# Patient Record
Sex: FEMALE | Race: WHITE | Employment: OTHER | URBAN - METROPOLITAN AREA
[De-identification: names, ages, dates, MRNs, and addresses within clinical notes are randomized per-mention and may not be internally consistent; named-entity substitution may affect disease eponyms.]

---

## 2017-01-13 ENCOUNTER — HOSPITAL ENCOUNTER (OUTPATIENT)
Dept: MAMMOGRAPHY | Age: 72
Discharge: HOME | End: 2017-01-13
Attending: FAMILY MEDICINE
Payer: MEDICARE

## 2017-01-13 DIAGNOSIS — Z12.31 VISIT FOR SCREENING MAMMOGRAM: ICD-10-CM

## 2017-01-13 PROCEDURE — 77067 SCR MAMMO BI INCL CAD: CPT

## 2017-02-27 ENCOUNTER — ANESTHESIA EVENT (OUTPATIENT)
Dept: ENDOSCOPY | Age: 72
End: 2017-02-27
Payer: MEDICARE

## 2017-02-27 ENCOUNTER — HOSPITAL ENCOUNTER (OUTPATIENT)
Age: 72
Setting detail: OUTPATIENT SURGERY
Discharge: HOME | End: 2017-02-27
Attending: INTERNAL MEDICINE | Admitting: INTERNAL MEDICINE
Payer: MEDICARE

## 2017-02-27 ENCOUNTER — ANESTHESIA (OUTPATIENT)
Dept: ENDOSCOPY | Age: 72
End: 2017-02-27
Payer: MEDICARE

## 2017-02-27 VITALS
TEMPERATURE: 98.6 F | SYSTOLIC BLOOD PRESSURE: 155 MMHG | DIASTOLIC BLOOD PRESSURE: 77 MMHG | HEIGHT: 62 IN | RESPIRATION RATE: 14 BRPM | BODY MASS INDEX: 22.67 KG/M2 | WEIGHT: 123.19 LBS | OXYGEN SATURATION: 100 % | HEART RATE: 80 BPM

## 2017-02-27 PROCEDURE — 76060000031 HC ANESTHESIA FIRST 0.5 HR: Performed by: INTERNAL MEDICINE

## 2017-02-27 PROCEDURE — 77030018712 HC DEV BLLN INFL BSC -B: Performed by: INTERNAL MEDICINE

## 2017-02-27 PROCEDURE — 74011250636 HC RX REV CODE- 250/636

## 2017-02-27 PROCEDURE — 74011250636 HC RX REV CODE- 250/636: Performed by: INTERNAL MEDICINE

## 2017-02-27 PROCEDURE — 76040000019: Performed by: INTERNAL MEDICINE

## 2017-02-27 PROCEDURE — 74011000250 HC RX REV CODE- 250

## 2017-02-27 RX ORDER — LIDOCAINE HYDROCHLORIDE 20 MG/ML
INJECTION, SOLUTION EPIDURAL; INFILTRATION; INTRACAUDAL; PERINEURAL AS NEEDED
Status: DISCONTINUED | OUTPATIENT
Start: 2017-02-27 | End: 2017-02-27 | Stop reason: HOSPADM

## 2017-02-27 RX ORDER — ATROPINE SULFATE 0.1 MG/ML
0.5 INJECTION INTRAVENOUS
Status: DISCONTINUED | OUTPATIENT
Start: 2017-02-27 | End: 2017-02-27 | Stop reason: HOSPADM

## 2017-02-27 RX ORDER — SODIUM CHLORIDE 0.9 % (FLUSH) 0.9 %
5-10 SYRINGE (ML) INJECTION AS NEEDED
Status: DISCONTINUED | OUTPATIENT
Start: 2017-02-27 | End: 2017-02-27 | Stop reason: HOSPADM

## 2017-02-27 RX ORDER — FLUMAZENIL 0.1 MG/ML
0.2 INJECTION INTRAVENOUS
Status: DISCONTINUED | OUTPATIENT
Start: 2017-02-27 | End: 2017-02-27 | Stop reason: HOSPADM

## 2017-02-27 RX ORDER — SUCRALFATE 1 G/10ML
1 SUSPENSION ORAL 4 TIMES DAILY
Qty: 1000 ML | Refills: 3 | Status: SHIPPED | OUTPATIENT
Start: 2017-02-27 | End: 2017-09-29

## 2017-02-27 RX ORDER — EPINEPHRINE 0.1 MG/ML
1 INJECTION INTRACARDIAC; INTRAVENOUS
Status: DISCONTINUED | OUTPATIENT
Start: 2017-02-27 | End: 2017-02-27 | Stop reason: HOSPADM

## 2017-02-27 RX ORDER — SODIUM CHLORIDE 0.9 % (FLUSH) 0.9 %
5-10 SYRINGE (ML) INJECTION EVERY 8 HOURS
Status: DISCONTINUED | OUTPATIENT
Start: 2017-02-27 | End: 2017-02-27 | Stop reason: HOSPADM

## 2017-02-27 RX ORDER — DEXTROMETHORPHAN/PSEUDOEPHED 2.5-7.5/.8
1.2 DROPS ORAL
Status: DISCONTINUED | OUTPATIENT
Start: 2017-02-27 | End: 2017-02-27 | Stop reason: HOSPADM

## 2017-02-27 RX ORDER — SUCRALFATE 1 G/1
1 TABLET ORAL 4 TIMES DAILY
Qty: 90 TAB | Refills: 3 | Status: ON HOLD | OUTPATIENT
Start: 2017-02-27 | End: 2017-04-12

## 2017-02-27 RX ORDER — SODIUM CHLORIDE 9 MG/ML
25 INJECTION, SOLUTION INTRAVENOUS CONTINUOUS
Status: DISCONTINUED | OUTPATIENT
Start: 2017-02-27 | End: 2017-02-27 | Stop reason: HOSPADM

## 2017-02-27 RX ORDER — PROPOFOL 10 MG/ML
INJECTION, EMULSION INTRAVENOUS AS NEEDED
Status: DISCONTINUED | OUTPATIENT
Start: 2017-02-27 | End: 2017-02-27 | Stop reason: HOSPADM

## 2017-02-27 RX ORDER — GUAIFENESIN 100 MG/5ML
81 LIQUID (ML) ORAL DAILY
COMMUNITY
End: 2019-03-28

## 2017-02-27 RX ORDER — GLYCOPYRROLATE 0.2 MG/ML
INJECTION INTRAMUSCULAR; INTRAVENOUS AS NEEDED
Status: DISCONTINUED | OUTPATIENT
Start: 2017-02-27 | End: 2017-02-27 | Stop reason: HOSPADM

## 2017-02-27 RX ORDER — NALOXONE HYDROCHLORIDE 0.4 MG/ML
0.4 INJECTION, SOLUTION INTRAMUSCULAR; INTRAVENOUS; SUBCUTANEOUS
Status: DISCONTINUED | OUTPATIENT
Start: 2017-02-27 | End: 2017-02-27 | Stop reason: HOSPADM

## 2017-02-27 RX ADMIN — PROPOFOL 150 MG: 10 INJECTION, EMULSION INTRAVENOUS at 10:14

## 2017-02-27 RX ADMIN — GLYCOPYRROLATE 0.2 MG: 0.2 INJECTION INTRAMUSCULAR; INTRAVENOUS at 10:02

## 2017-02-27 RX ADMIN — LIDOCAINE HYDROCHLORIDE 60 MG: 20 INJECTION, SOLUTION EPIDURAL; INFILTRATION; INTRACAUDAL; PERINEURAL at 10:02

## 2017-02-27 RX ADMIN — SODIUM CHLORIDE 25 ML/HR: 900 INJECTION, SOLUTION INTRAVENOUS at 09:53

## 2017-02-27 NOTE — PROGRESS NOTES
Dani Haddad  1945  [id number]    Situation:  Verbal report received from: Felisa Mota  Procedure: Procedure(s):  EGD WITH GUIDEWIRE DILATION  ESOPHAGEAL DILATION    Background:    Preoperative diagnosis: DYSPHAGIA, PHARYNGOESOPHAEL, RADIATION-INDUCED ESOP  Postoperative diagnosis: dysphagia, esophageal stricture    :  Dr. Noy Sawyer  Assistant(s): Endoscopy Technician-1: Nohemi Leyva  Endoscopy RN-1: Elizabeth Turner RN    Specimens: * No specimens in log *  H. Pylori  no    Assessment:  Intra-procedure medications     Anesthesia gave intra-procedure sedation and medications, see anesthesia flow sheet yes    Intravenous fluids: NS@ KVO     Vital signs stable     Abdominal assessment: round and soft     Recommendation:  Discharge patient per MD order.   Family or Friend   Permission to share finding with family or friend yes

## 2017-02-27 NOTE — PROCEDURES
NAME:  Lit Verdugo   :   1945   MRN:   [id number]     Date/Time:  2017 11:46 AM    Esophagogastroduodenoscopy (EGD) Procedure Note    Procedure: Esophagogastroduodenoscopy with esophageal dilation    Indication: Esophageal stricture, likely from radiation  Pre-operative Diagnosis: see indication above  Post-operative Diagnosis: see findings below  :  Priya Cortez MD  Referring Provider:   -Selina Charles MD    Exam:  Airway: clear, no airway problems anticipated  Heart: RRR, without gallops or rubs  Lungs: clear bilaterally without wheezes, crackles, or rhonchi  Abdomen: soft, nontender, nondistended, bowel sounds present  Mental Status: awake, alert and oriented to person, place and time     Anethesia/Sedation:  MAC anesthesia Propofol  Procedure Details   After informed consent was obtained for the procedure, with all risks and benefits of procedure explained the patient was taken to the endoscopy suite and placed in the left lateral decubitus position. Following sequential administration of sedation as per above, the [id number] gastroscope was inserted into the mouth and advanced under direct vision to second portion of the duodenum. A careful inspection was made as the gastroscope was withdrawn, including a retroflexed view of the proximal stomach; findings and interventions are described below. Findings:   OROPHARYNX: Cords and pyriform recesses with some edema  ESOPHAGUS:   -- mid to distal esophagus with multiple concentric narrowed rings, and too narrow to allow passage of the upper endoscope with mild resistance. -- dilated to 33 fr, 36 fr, 39 fr with noted shear effect using the bougie dilators over the wire. Re-look endoscopy performed. -- scope still felt snug, but was able to slide into gastric lumen and continue exam after dilation. STOMACH: The fundus on antegrade and retroflex views reveals a hiatal hernia.  The body is normal. Antrum and pylorus are surgically absent. DUODENUM: The bulb and second portions are normal.    Therapies:  esophageal dilation with 33 fr, 36 fr, 39 fr bougie dilators    Specimens: none    EBL:  None. Complications:   None; patient tolerated the procedure well. Impression:    -- narrowed, concentric stricturing in mid and distal esophagus, dilated to max of 39 fr with bougie dilators over the wire, with moderate shear effect on re-look endoscopy  -- hiatal hernia  -- history of radiation, the likely source of esophageal stricturing  -- surgical changes from previous antrectomy    Recommendations:  -- Continue acid suppression.   -- repeat EGD in 2-4 weeks for repeat dilation or as needed  -- Carafate QID x 10 days    Discharge disposition:  Home in the company of  when able to ambulate    Hillary Graff MD

## 2017-02-27 NOTE — ANESTHESIA POSTPROCEDURE EVALUATION
Post-Anesthesia Evaluation and Assessment    Patient: Rocky Wyatt MRN: [id number]  SSN: [id number]    YOB: 1945  Age: 74 y.o. Sex: female       Cardiovascular Function/Vital Signs  Visit Vitals    /77    Pulse 80    Temp 37 °C (98.6 °F)    Resp 14    Ht 5' 2\" (1.575 m)    Wt 55.9 kg (123 lb 3 oz)    SpO2 100%    Breastfeeding No    BMI 22.53 kg/m2       Patient is status post total IV anesthesia anesthesia for Procedure(s):  EGD WITH GUIDEWIRE DILATION  ESOPHAGEAL DILATION. Nausea/Vomiting: None    Postoperative hydration reviewed and adequate. Pain:  Pain Scale 1: Numeric (0 - 10) (02/27/17 1045)  Pain Intensity 1: 0 (02/27/17 1045)   Managed    Neurological Status: At baseline    Mental Status and Level of Consciousness: Arousable    Pulmonary Status:   O2 Device: Room air (02/27/17 1045)   Adequate oxygenation and airway patent    Complications related to anesthesia: None    Post-anesthesia assessment completed.  No concerns    Signed By: Christine Modi DO     February 27, 2017

## 2017-02-27 NOTE — IP AVS SNAPSHOT
[address] [facility] 
[phone] Patient: Charmayne Decent MRN: [id number] HGO: You are allergic to the following No active allergies Recent Documentation Height  
  
  
  
  
  
 1.575 m Emergency Contacts Name Discharge Info Relation Home Work Mobile Claudine Morel CAREGIVER [3] Spouse [3] [phone] About your hospitalization You were admitted on:  2017 You last received care in the:  [facility] You were discharged on:  2017 Unit phone number:  [phone] Why you were hospitalized Your primary diagnosis was:  Not on File Providers Seen During Your Hospitalizations Provider Role Specialty Primary office phone Sabas Mcleod MD Attending Provider Gastroenterology [phone] Your Primary Care Physician (PCP) Primary Care Physician Office Phone Office Fax 3 [address] [phone] Follow-up Information Follow up With Details Comments Contact Info Marilee Pate MD   [address] [facility] 
[phone] Your Appointments 2017 ESOPHAGOGASTRODUODENOSCOPY (EGD), ESOPHAGEAL DILATION with Sabas Mcleod MD  
[facility] ENDOSCOPY (RI OR PRE ASSESSMENT) [address] [facility]  
[phone] 2017 10:30 AM EDT  
XR BA SWALLOW with Favio Stover [facility]  
[facility] RADIOLOGY [address]) [address] [facility]  
[phone] **Exams including a Small Bowel series may take up to 4 hours. Patient needs to register 30 minutes prior to exam.  1.  Adults:  Nothing to eat or drink after midnight.  2.  [place] to 6 months:  Nothing to eat or drink for 3 hours prior to the study. 3.  6 months to 1 year:  Nothing to eat or drink for 4 hours prior to the study. 4.  1 year to 4 years:  Nothing to eat or drink after midnight except for routine medications, if early morning study.  Otherwise, nothing to eat or drink 4 hours prior to study. 5.  5 years to 12 years:  Nothing to eat or drink after midnight except for routine medications, if early morning study.  Otherwise, nothing to eat or drink 6 hours prior to study.  6.  13 years to 18 years:  Nothing to eat or drink after midnight except for routing medications, if early morning study.  Otherwise, nothing to eat or drink 8 hours prior to study.  7.  You must bring a LIST or BAG of all current medication you are taking with you to your appointment. Patient should report to outpatient registration ([facility]) 30 minutes prior to the appointment time unless instructed otherwise. Current Discharge Medication List  
  
START taking these medications Dose & Instructions Dispensing Information Comments Morning Noon Evening Bedtime * sucralfate 100 mg/mL suspension Commonly known as:  Ressie Flavin Your next dose is: Today, Tomorrow Other:  _________ Dose:  1 g Take 10 mL by mouth four (4) times daily. Quantity:  1000 mL Refills:  3  
     
   
   
   
  
 * sucralfate 1 gram tablet Commonly known as:  Ressie Flavin Your next dose is: Today, Tomorrow Other:  _________ Dose:  1 g Take 1 Tab by mouth four (4) times daily. Crush finely, mix with 10 mL of water, swallow Quantity:  90 Tab Refills:  3  
     
   
   
   
  
 * Notice: This list has 2 medication(s) that are the same as other medications prescribed for you. Read the directions carefully, and ask your doctor or other care provider to review them with you. CONTINUE these medications which have NOT CHANGED Dose & Instructions Dispensing Information Comments Morning Noon Evening Bedtime ALEVE 220 mg Cap Generic drug:  naproxen sodium Your next dose is: Today, Tomorrow Other:  _________ Take  by mouth as needed. Refills:  0  
     
   
   
   
  
 aspirin 81 mg chewable tablet Your next dose is: Today, Tomorrow Other:  _________ Dose:  81 mg Take 81 mg by mouth daily. Refills:  0 LIPITOR 40 mg tablet Generic drug:  atorvastatin Your next dose is: Today, Tomorrow Other:  _________ Dose:  20 mg Take 20 mg by mouth nightly. Refills:  0 NORVASC 10 mg tablet Generic drug:  amLODIPine Your next dose is: Today, Tomorrow Other:  _________ Dose:  5 mg Take 5 mg by mouth daily. Refills:  0 PREMARIN 0.625 mg/gram vaginal cream  
Generic drug:  conjugated estrogens Your next dose is: Today, Tomorrow Other:  _________ Dose:  0.5 g Insert 0.5 g into vagina daily. Refills:  0  
     
   
   
   
  
 SYNTHROID PO Your next dose is: Today, Tomorrow Other:  _________ Dose:  75 mcg Take 75 mcg by mouth daily. Refills:  0  
     
   
   
   
  
 temazepam 15 mg capsule Commonly known as:  RESTORIL Your next dose is: Today, Tomorrow Other:  _________ Take  by mouth nightly as needed for Sleep. Refills:  0 Where to Get Your Medications Information on where to get these meds will be given to you by the nurse or doctor. ! Ask your nurse or doctor about these medications  
  sucralfate 1 gram tablet  
 sucralfate 100 mg/mL suspension Discharge Instructions Adriana Magana [id number] EGD DISCHARGE INSTRUCTIONS Discomfort: 
Sore throat- throat lozenges or warm salt water gargle redness at IV site- apply warm compress to area; if redness or soreness persist- contact your physician Gaseous discomfort- walking, belching will help relieve any discomfort You may not operate a vehicle for 12 hours You may not engage in an occupation involving machinery or appliances for rest of today You may not drink alcoholic beverages for at least 12 hours Avoid making any critical decisions for at least 24 hour DIET You may have minimal sips at this time-- do not eat or drink for two hours. You may eat and drink after you wake up You may resume your regular diet  however -  remember your colon is empty and a heavy meal will produce gas. Avoid these foods:  vegetables, fried / greasy foods, carbonated drinks MEDICATIONS: 
See attached ACTIVITY You may resume your normal daily activities until tomorrow AM; 
Spend the remainder of the day resting -  avoid any strenuous activity. CALL M.D. ANY SIGN OF Increasing pain, nausea, vomiting Abdominal distension (swelling) New increased bleeding (oral or rectal) Fever (chills) Pain in chest area Bloody discharge from nose or mouth Shortness of breath You should NOT take any Advil, Aspirin, Ibuprofen, Motrin, Aleve, or Goodys for 10 days, ONLY  Tylenol as needed for pain. IMPRESSION: 
--- esophageal stricturing, not quite as tight as the first time we dilated, but more tight than previously --- we used a larger, more complete style of stretching the esophagus to get a more durable effect. --- We'll need to repeat this in 2-4 weeks, call the office to schedule this with [facility] later this week! Follow-up Instructions: 
 Call Dr. Maritza Brown for any questions or concerns Telephone # [phone] Repeat upper endoscopy in 2-4 weeks Joanne Kingston MD 
 
 [employer] Activation Thank you for requesting access to [employer]. Please follow the instructions below to securely access and download your online medical record.  [employer] allows you to send messages to your doctor, view your test results, renew your prescriptions, schedule appointments, and more. How Do I Sign Up? 1. In your internet browser, go to [email or web address] 
2. Click on the First Time User? Click Here link in the Sign In box. You will be redirect to the New Member Sign Up page. 3. Enter your [employer] Access Code exactly as it appears below. You will not need to use this code after youve completed the sign-up process. If you do not sign up before the expiration date, you must request a new code. [employer] Access Code: [email or web address] Expires: 2017 11:56 AM (This is the date your [employer] access code will ) 4. Enter the last four digits of your Social Security Number (xxxx) and Date of Birth (mm/dd/yyyy) as indicated and click Submit. You will be taken to the next sign-up page. 5. Create a [employer] ID. This will be your [employer] login ID and cannot be changed, so think of one that is secure and easy to remember. 6. Create a [employer] password. You can change your password at any time. 7. Enter your Password Reset Question and Answer. This can be used at a later time if you forget your password. 8. Enter your e-mail address. You will receive e-mail notification when new information is available in [address] 9. Click Sign Up. You can now view and download portions of your medical record. 10. Click the Download Summary menu link to download a portable copy of your medical information. Additional Information If you have questions, please visit the Frequently Asked Questions section of the [employer] website at [email or web address] Remember, [employer] is NOT to be used for urgent needs. For medical emergencies, dial 911. Discharge Orders None Introducing [facility]!    
 [facility] introduces [employer] patient portal. Now you can access parts of your medical record, email your doctor's office, and request medication refills online. 1. In your internet browser, go to [email or web address] 2. Click on the First Time User? Click Here link in the Sign In box. You will see the New Member Sign Up page. 3. Enter your [email or web address] Access Code exactly as it appears below. You will not need to use this code after youve completed the sign-up process. If you do not sign up before the expiration date, you must request a new code. · [email or web address] Access Code: [email or web address] Expires: 4/4/2017 11:56 AM 
 
4. Enter the last four digits of your Social Security Number (xxxx) and Date of Birth (mm/dd/yyyy) as indicated and click Submit. You will be taken to the next sign-up page. 5. Create a [email or web address] ID. This will be your [email or web address] login ID and cannot be changed, so think of one that is secure and easy to remember. 6. Create a [email or web address] password. You can change your password at any time. 7. Enter your Password Reset Question and Answer. This can be used at a later time if you forget your password. 8. Enter your e-mail address. You will receive e-mail notification when new information is available in [address] 9. Click Sign Up. You can now view and download portions of your medical record. 10. Click the Download Summary menu link to download a portable copy of your medical information. If you have questions, please visit the Frequently Asked Questions section of the [email or web address] website. Remember, [email or web address] is NOT to be used for urgent needs. For medical emergencies, dial 911. Now available from your iPhone and Android! General Information Please provide this summary of care documentation to your next provider. Patient Signature:  ____________________________________________________________ Date:  ____________________________________________________________  
  
Efren Naas Provider Signature:  ____________________________________________________________ Date:  ____________________________________________________________

## 2017-02-27 NOTE — PERIOP NOTES
Anesthesia reports 150mg Propofol, 60mg Lidocaine and 300mL NS, 0.2 mg robinul given during procedure. Received report from anesthesia staff on vital signs and status of patient.

## 2017-02-27 NOTE — H&P
Date of Surgery Update:  Cely Campbell was seen and examined. History and physical has been reviewed. The patient has been examined.  There have been no significant clinical changes since the completion of the originally dated History and Physical.    Signed By: Farida Jenkins MD     February 27, 2017 9:56 AM

## 2017-02-27 NOTE — DISCHARGE INSTRUCTIONS
Adrianna [facility]  [id number]  1945    EGD DISCHARGE INSTRUCTIONS  Discomfort:  Sore throat- throat lozenges or warm salt water gargle  redness at IV site- apply warm compress to area; if redness or soreness persist- contact your physician  Gaseous discomfort- walking, belching will help relieve any discomfort  You may not operate a vehicle for 12 hours  You may not engage in an occupation involving machinery or appliances for rest of today  You may not drink alcoholic beverages for at least 12 hours  Avoid making any critical decisions for at least 24 hour  DIET  You may have minimal sips at this time-- do not eat or drink for two hours. You may eat and drink after you wake up  You may resume your regular diet - however -  remember your colon is empty and a heavy meal will produce gas. Avoid these foods:  vegetables, fried / greasy foods, carbonated drinks    MEDICATIONS:  See attached    ACTIVITY  You may resume your normal daily activities until tomorrow AM;  Spend the remainder of the day resting -  avoid any strenuous activity. CALL M.D. ANY SIGN OF   Increasing pain, nausea, vomiting  Abdominal distension (swelling)  New increased bleeding (oral or rectal)  Fever (chills)  Pain in chest area  Bloody discharge from nose or mouth  Shortness of breath    You should NOT take any Advil, Aspirin, Ibuprofen, Motrin, Aleve, or Goodys for 10 days, ONLY  Tylenol as needed for pain. IMPRESSION:  --- esophageal stricturing, not quite as tight as the first time we dilated, but more tight than previously  --- we used a larger, more complete style of stretching the esophagus to get a more durable effect. --- We'll need to repeat this in 2-4 weeks, call the office to schedule this with [facility] later this week!     Follow-up Instructions:   Call Dr. Mary Ann Steinberg for any questions or concerns   Telephone # [phone]  Repeat upper endoscopy in 2-4 weeks    Seth Sosa MD     MyChart Activation    Thank you for requesting access to [employer]. Please follow the instructions below to securely access and download your online medical record. [employer] allows you to send messages to your doctor, view your test results, renew your prescriptions, schedule appointments, and more. How Do I Sign Up? 1. In your internet browser, go to [email or web address]  2. Click on the First Time User? Click Here link in the Sign In box. You will be redirect to the New Member Sign Up page. 3. Enter your [employer] Access Code exactly as it appears below. You will not need to use this code after youve completed the sign-up process. If you do not sign up before the expiration date, you must request a new code. [employer] Access Code: [email or web address] Guerra  Expires: 2017 11:56 AM (This is the date your [employer] access code will )    4. Enter the last four digits of your Social Security Number (xxxx) and Date of Birth (mm/dd/yyyy) as indicated and click Submit. You will be taken to the next sign-up page. 5. Create a [employer] ID. This will be your [employer] login ID and cannot be changed, so think of one that is secure and easy to remember. 6. Create a [employer] password. You can change your password at any time. 7. Enter your Password Reset Question and Answer. This can be used at a later time if you forget your password. 8. Enter your e-mail address. You will receive e-mail notification when new information is available in [address] 9. Click Sign Up. You can now view and download portions of your medical record. 10. Click the Download Summary menu link to download a portable copy of your medical information. Additional Information    If you have questions, please visit the Frequently Asked Questions section of the [employer] website at [email or web address] Remember, [employer] is NOT to be used for urgent needs. For medical emergencies, dial 911.

## 2017-02-27 NOTE — ANESTHESIA PREPROCEDURE EVALUATION
Anesthetic History   No history of anesthetic complications            Review of Systems / Medical History  Patient summary reviewed, nursing notes reviewed and pertinent labs reviewed    Pulmonary  Within defined limits                 Neuro/Psych   Within defined limits           Cardiovascular    Hypertension              Exercise tolerance: >4 METS     GI/Hepatic/Renal  Within defined limits              Endo/Other    Diabetes  Hypothyroidism  Cancer     Other Findings   Comments: H/O oral CA s/p radiation           Physical Exam    Airway  Mallampati: II  TM Distance: 4 - 6 cm  Neck ROM: normal range of motion   Mouth opening: Diminished (comment)     Cardiovascular  Regular rate and rhythm,  S1 and S2 normal,  no murmur, click, rub, or gallop             Dental    Dentition: Full upper dentures and Lower partial plate     Pulmonary  Breath sounds clear to auscultation               Abdominal  GI exam deferred       Other Findings            Anesthetic Plan    ASA: 3  Anesthesia type: general and total IV anesthesia          Induction: Intravenous  Anesthetic plan and risks discussed with: Patient

## 2017-03-14 ENCOUNTER — HOSPITAL ENCOUNTER (OUTPATIENT)
Dept: GENERAL RADIOLOGY | Age: 72
Discharge: HOME | End: 2017-03-14
Attending: INTERNAL MEDICINE
Payer: MEDICARE

## 2017-03-14 DIAGNOSIS — K22.2 RADIATION-INDUCED ESOPHAGEAL STRICTURE: ICD-10-CM

## 2017-03-14 PROCEDURE — 74220 X-RAY XM ESOPHAGUS 1CNTRST: CPT

## 2017-03-24 RX ORDER — AMLODIPINE BESYLATE 10 MG/1
10 TABLET ORAL
COMMUNITY
End: 2019-03-22 | Stop reason: DRUGHIGH

## 2017-03-24 RX ORDER — LEVOTHYROXINE SODIUM 75 UG/1
75 TABLET ORAL
COMMUNITY

## 2017-03-24 RX ORDER — ATORVASTATIN CALCIUM 20 MG/1
20 TABLET, FILM COATED ORAL
COMMUNITY

## 2017-03-24 RX ORDER — ACETAMINOPHEN 500 MG
1000 TABLET ORAL
COMMUNITY

## 2017-03-27 ENCOUNTER — ANESTHESIA EVENT (OUTPATIENT)
Dept: ENDOSCOPY | Age: 72
End: 2017-03-27
Payer: MEDICARE

## 2017-03-27 ENCOUNTER — HOSPITAL ENCOUNTER (OUTPATIENT)
Age: 72
Setting detail: OUTPATIENT SURGERY
Discharge: HOME | End: 2017-03-27
Attending: INTERNAL MEDICINE | Admitting: INTERNAL MEDICINE
Payer: MEDICARE

## 2017-03-27 ENCOUNTER — ANESTHESIA (OUTPATIENT)
Dept: ENDOSCOPY | Age: 72
End: 2017-03-27
Payer: MEDICARE

## 2017-03-27 VITALS
RESPIRATION RATE: 12 BRPM | OXYGEN SATURATION: 100 % | HEIGHT: 62 IN | SYSTOLIC BLOOD PRESSURE: 127 MMHG | HEART RATE: 75 BPM | BODY MASS INDEX: 22.82 KG/M2 | TEMPERATURE: 97.5 F | DIASTOLIC BLOOD PRESSURE: 65 MMHG | WEIGHT: 124 LBS

## 2017-03-27 PROCEDURE — 76060000031 HC ANESTHESIA FIRST 0.5 HR: Performed by: INTERNAL MEDICINE

## 2017-03-27 PROCEDURE — 76040000019: Performed by: INTERNAL MEDICINE

## 2017-03-27 PROCEDURE — 74011000250 HC RX REV CODE- 250

## 2017-03-27 PROCEDURE — 74011250636 HC RX REV CODE- 250/636

## 2017-03-27 PROCEDURE — 74011250636 HC RX REV CODE- 250/636: Performed by: INTERNAL MEDICINE

## 2017-03-27 RX ORDER — SODIUM CHLORIDE 0.9 % (FLUSH) 0.9 %
5-10 SYRINGE (ML) INJECTION EVERY 8 HOURS
Status: CANCELLED | OUTPATIENT
Start: 2017-03-27 | End: 2017-03-27

## 2017-03-27 RX ORDER — DEXTROMETHORPHAN/PSEUDOEPHED 2.5-7.5/.8
1.2 DROPS ORAL
Status: CANCELLED | OUTPATIENT
Start: 2017-03-27

## 2017-03-27 RX ORDER — ATROPINE SULFATE 0.1 MG/ML
0.5 INJECTION INTRAVENOUS
Status: CANCELLED | OUTPATIENT
Start: 2017-03-27 | End: 2017-03-27

## 2017-03-27 RX ORDER — DEXTROMETHORPHAN/PSEUDOEPHED 2.5-7.5/.8
1.2 DROPS ORAL
Status: DISCONTINUED | OUTPATIENT
Start: 2017-03-27 | End: 2017-03-27 | Stop reason: HOSPADM

## 2017-03-27 RX ORDER — PROPOFOL 10 MG/ML
INJECTION, EMULSION INTRAVENOUS AS NEEDED
Status: DISCONTINUED | OUTPATIENT
Start: 2017-03-27 | End: 2017-03-27 | Stop reason: HOSPADM

## 2017-03-27 RX ORDER — SODIUM CHLORIDE 9 MG/ML
25 INJECTION, SOLUTION INTRAVENOUS CONTINUOUS
Status: DISCONTINUED | OUTPATIENT
Start: 2017-03-27 | End: 2017-03-27 | Stop reason: HOSPADM

## 2017-03-27 RX ORDER — FLUMAZENIL 0.1 MG/ML
0.2 INJECTION INTRAVENOUS
Status: DISCONTINUED | OUTPATIENT
Start: 2017-03-27 | End: 2017-03-27 | Stop reason: HOSPADM

## 2017-03-27 RX ORDER — SODIUM CHLORIDE 0.9 % (FLUSH) 0.9 %
5-10 SYRINGE (ML) INJECTION AS NEEDED
Status: DISCONTINUED | OUTPATIENT
Start: 2017-03-27 | End: 2017-03-27 | Stop reason: HOSPADM

## 2017-03-27 RX ORDER — EPINEPHRINE 0.1 MG/ML
1 INJECTION INTRACARDIAC; INTRAVENOUS
Status: DISCONTINUED | OUTPATIENT
Start: 2017-03-27 | End: 2017-03-27 | Stop reason: HOSPADM

## 2017-03-27 RX ORDER — ATROPINE SULFATE 0.1 MG/ML
0.5 INJECTION INTRAVENOUS
Status: DISCONTINUED | OUTPATIENT
Start: 2017-03-27 | End: 2017-03-27 | Stop reason: HOSPADM

## 2017-03-27 RX ORDER — SODIUM CHLORIDE 0.9 % (FLUSH) 0.9 %
5-10 SYRINGE (ML) INJECTION AS NEEDED
Status: CANCELLED | OUTPATIENT
Start: 2017-03-27 | End: 2017-03-27

## 2017-03-27 RX ORDER — NALOXONE HYDROCHLORIDE 0.4 MG/ML
0.4 INJECTION, SOLUTION INTRAMUSCULAR; INTRAVENOUS; SUBCUTANEOUS
Status: CANCELLED | OUTPATIENT
Start: 2017-03-27 | End: 2017-03-27

## 2017-03-27 RX ORDER — FLUMAZENIL 0.1 MG/ML
0.2 INJECTION INTRAVENOUS
Status: CANCELLED | OUTPATIENT
Start: 2017-03-27 | End: 2017-03-27

## 2017-03-27 RX ORDER — SODIUM CHLORIDE 0.9 % (FLUSH) 0.9 %
5-10 SYRINGE (ML) INJECTION EVERY 8 HOURS
Status: DISCONTINUED | OUTPATIENT
Start: 2017-03-27 | End: 2017-03-27 | Stop reason: HOSPADM

## 2017-03-27 RX ORDER — GLYCOPYRROLATE 0.2 MG/ML
INJECTION INTRAMUSCULAR; INTRAVENOUS AS NEEDED
Status: DISCONTINUED | OUTPATIENT
Start: 2017-03-27 | End: 2017-03-27 | Stop reason: HOSPADM

## 2017-03-27 RX ORDER — EPINEPHRINE 0.1 MG/ML
1 INJECTION INTRACARDIAC; INTRAVENOUS
Status: CANCELLED | OUTPATIENT
Start: 2017-03-27 | End: 2017-03-27

## 2017-03-27 RX ORDER — NALOXONE HYDROCHLORIDE 0.4 MG/ML
0.4 INJECTION, SOLUTION INTRAMUSCULAR; INTRAVENOUS; SUBCUTANEOUS
Status: DISCONTINUED | OUTPATIENT
Start: 2017-03-27 | End: 2017-03-27 | Stop reason: HOSPADM

## 2017-03-27 RX ORDER — LIDOCAINE HYDROCHLORIDE 20 MG/ML
INJECTION, SOLUTION EPIDURAL; INFILTRATION; INTRACAUDAL; PERINEURAL AS NEEDED
Status: DISCONTINUED | OUTPATIENT
Start: 2017-03-27 | End: 2017-03-27 | Stop reason: HOSPADM

## 2017-03-27 RX ADMIN — PROPOFOL 120 MG: 10 INJECTION, EMULSION INTRAVENOUS at 10:15

## 2017-03-27 RX ADMIN — LIDOCAINE HYDROCHLORIDE 60 MG: 20 INJECTION, SOLUTION EPIDURAL; INFILTRATION; INTRACAUDAL; PERINEURAL at 10:07

## 2017-03-27 RX ADMIN — GLYCOPYRROLATE 0.2 MG: 0.2 INJECTION INTRAMUSCULAR; INTRAVENOUS at 10:07

## 2017-03-27 RX ADMIN — SODIUM CHLORIDE 25 ML/HR: 900 INJECTION, SOLUTION INTRAVENOUS at 09:56

## 2017-03-27 NOTE — ANESTHESIA POSTPROCEDURE EVALUATION
Post-Anesthesia Evaluation and Assessment    Patient: Je Monday MRN: [id number]  SSN: [id number]    YOB: 1945  Age: 74 y.o. Sex: female       Cardiovascular Function/Vital Signs  Visit Vitals    /65    Pulse 75    Temp 36.4 °C (97.5 °F)    Resp 12    Ht 5' 2\" (1.575 m)    Wt 56.2 kg (124 lb)    SpO2 100%    BMI 22.68 kg/m2       Patient is status post total IV anesthesia anesthesia for Procedure(s):  ESOPHAGOGASTRODUODENOSCOPY (EGD)  ESOPHAGEAL DILATION. Nausea/Vomiting: None    Postoperative hydration reviewed and adequate. Pain:  Pain Scale 1: Numeric (0 - 10) (03/27/17 1058)  Pain Intensity 1: 0 (03/27/17 1058)   Managed    Neurological Status: At baseline    Mental Status and Level of Consciousness: Arousable    Pulmonary Status:   O2 Device: Room air (03/27/17 1058)   Adequate oxygenation and airway patent    Complications related to anesthesia: None    Post-anesthesia assessment completed.  No concerns    Signed By: Minor Grace DO     March 27, 2017

## 2017-03-27 NOTE — PROCEDURES
NAME:  Giovani Crespo   :   1945   MRN:   [id number]     Date/Time:  3/27/2017 10:05 AM    Esophagogastroduodenoscopy (EGD) Procedure Note    Procedure: Esophagogastroduodenoscopy with esophageal dilation    Indication: Esophageal stricture, likely from radiation  Pre-operative Diagnosis: see indication above  Post-operative Diagnosis: see findings below  :  Elizabeth Riley MD  Referring Provider:   -Quinn Sewell MD    Exam:  Airway: clear, no airway problems anticipated  Heart: RRR, without gallops or rubs  Lungs: clear bilaterally without wheezes, crackles, or rhonchi  Abdomen: soft, nontender, nondistended, bowel sounds present  Mental Status: awake, alert and oriented to person, place and time     Anethesia/Sedation:  MAC anesthesia Propofol  Procedure Details   After informed consent was obtained for the procedure, with all risks and benefits of procedure explained the patient was taken to the endoscopy suite and placed in the left lateral decubitus position. Following sequential administration of sedation as per above, the [id number] gastroscope was inserted into the mouth and advanced under direct vision to second portion of the duodenum. A careful inspection was made as the gastroscope was withdrawn, including a retroflexed view of the proximal stomach; findings and interventions are described below. Findings:   OROPHARYNX: Cords and pyriform recesses with some edema  ESOPHAGUS:   -- mid to distal esophagus with multiple concentric narrowed rings, and too narrow to allow passage of the upper endoscope with mild resistance. -- dilated to 36 fr, 39 fr with noted shear effect using the bougie dilators over the wire. -- scope still felt snug, but was able to slide into gastric lumen and continue exam after dilation. STOMACH: The fundus on antegrade and retroflex views reveals a hiatal hernia. The body is normal. Antrum and pylorus are surgically absent.   DUODENUM: The bulb and second portions are normal.    Therapies:  esophageal dilation with 36 fr, 39 fr bougie dilators    Specimens: none    EBL:  None. Complications:   None; patient tolerated the procedure well. Impression:    -- narrowed, concentric stricturing in mid and distal esophagus, dilated to max of 39 fr with bougie dilators over the wire, with moderate resistance and blood. -- hiatal hernia  -- history of radiation, the likely source of esophageal stricturing  -- surgical changes from previous antrectomy    Recommendations:  -- Continue acid suppression.   -- repeat EGD in 2 weeks for repeat dilatio  -- Carafate QID x 10 days    Discharge disposition:  Home in the company of  when able to ambulate    Barbara Borden MD

## 2017-03-27 NOTE — ANESTHESIA PREPROCEDURE EVALUATION
Anesthetic History   No history of anesthetic complications            Review of Systems / Medical History  Patient summary reviewed, nursing notes reviewed and pertinent labs reviewed    Pulmonary  Within defined limits              Comments: EX smoker, quit in 1980   Neuro/Psych             Comments: insomnia Cardiovascular    Hypertension          Hyperlipidemia    Exercise tolerance: >4 METS     GI/Hepatic/Renal  Within defined limits             Comments: Esophageal stricture    Hx of gastric ulcer excision---1/3rd of stomach removed Endo/Other    Diabetes  Hypothyroidism  Cancer     Other Findings   Comments: H/O oral CA s/p radiation         Physical Exam    Airway  Mallampati: II  TM Distance: 4 - 6 cm  Neck ROM: normal range of motion   Mouth opening: Diminished (comment)     Cardiovascular  Regular rate and rhythm,  S1 and S2 normal,  no murmur, click, rub, or gallop             Dental    Dentition: Full upper dentures and Lower partial plate     Pulmonary  Breath sounds clear to auscultation               Abdominal  GI exam deferred       Other Findings            Anesthetic Plan    ASA: 3  Anesthesia type: general and total IV anesthesia          Induction: Intravenous  Anesthetic plan and risks discussed with: Patient

## 2017-03-27 NOTE — PROGRESS NOTES
Ginny Altamirano  1945  [id number]    Situation:  Verbal report received from: Paloma Young RN  Procedure: Procedure(s):  ESOPHAGOGASTRODUODENOSCOPY (EGD)  ESOPHAGEAL DILATION    Background:    Preoperative diagnosis: ESOPHAGEAL STRICTURE  Postoperative diagnosis: esophageal stricture    :  Dr. Jimi Hansen  Assistant(s): Endoscopy Technician-1: Dede Veronica  Endoscopy RN-1: Prisca Quinones    Specimens: * No specimens in log *  H. Pylori  no    Assessment:  Intra-procedure medications :  Propofol 120 mg      Anesthesia gave intra-procedure sedation and medications, see anesthesia flow sheet yes    Intravenous fluids: NS@ KVO     Vital signs stable     Abdominal assessment: round and soft     Recommendation:  Discharge patient per MD order. Family here.   Permission to share finding with family or friend yes

## 2017-03-27 NOTE — DISCHARGE INSTRUCTIONS
Marshall Baez  [id number]  1945    EGD DISCHARGE INSTRUCTIONS  Discomfort:  Sore throat- throat lozenges or warm salt water gargle  redness at IV site- apply warm compress to area; if redness or soreness persist- contact your physician  Gaseous discomfort- walking, belching will help relieve any discomfort  You may not operate a vehicle for 12 hours  You may not engage in an occupation involving machinery or appliances for rest of today  You may not drink alcoholic beverages for at least 12 hours  Avoid making any critical decisions for at least 24 hour  DIET  You may have minimal sips at this time-- do not eat or drink for two hours. You may eat and drink after you wake up  You may resume your regular diet - however -  remember your colon is empty and a heavy meal will produce gas. Avoid these foods:  vegetables, fried / greasy foods, carbonated drinks    MEDICATIONS:  See attached - please resume the carafate! ACTIVITY  You may resume your normal daily activities until tomorrow AM;  Spend the remainder of the day resting -  avoid any strenuous activity. CALL M.D. ANY SIGN OF   Increasing pain, nausea, vomiting  Abdominal distension (swelling)  New increased bleeding (oral or rectal)  Fever (chills)  Pain in chest area  Bloody discharge from nose or mouth  Shortness of breath    You should not take any Advil, Aspirin, Ibuprofen, Motrin, Aleve, or Goodys for 10 days, ONLY  Tylenol as needed for pain. IMPRESSION:  -- again, dilated with esophagus sized tubes. This time the esophagus remained more open than at our last visit one month ago, but was more narrow than the maximum we dilated last time. We need to try to dilate sooner next time, in about TWO weeks.     Follow-up Instructions:   Call Dr. Gurdeep Ozuna for the results of procedure / biopsy in 7-10 days   Telephone # [phone]  Repeat upper endoscopy in 2 weeks    Hillary Graff MD

## 2017-03-27 NOTE — H&P
Gastroenterology Outpatient History and Physical    Patient: Marquez King    Physician: Vilinda Duverney, MD    Chief Complaint: esophageal stricture  History of Present Illness: EGD with dilation 1 month ago, re-dilation serially planned. History:  Past Medical History:   Diagnosis Date    Cancer ([facility] [address]     Gum (SURGERY/RADIATION)    Diabetes ([facility] [address]     no meds required; controlled by diet and weight    Hypertension     Ill-defined condition     no venipuncture left arm--hx skin graft    Thyroid disease       Past Surgical History:   Procedure Laterality Date    ABDOMEN SURGERY PROC UNLISTED  7/5/2013    Feeding tube placement ( has been removed)    HX GI  10/2016    esoph w/ dilatation    HX GI  1980's    gastric ulcer excision (1/3 stomach removed)    HX GYN      Hysterectomy    HX HEENT  2013    gum surgery    HX HEENT Bilateral     TUBES IN EARS    HX HEENT      TRACHEOSTOMY    HX ORTHOPAEDIC      broken wrist    HX OTHER SURGICAL  2013    left arm veins/tissue used for oral/facial reconstruction    UPPER GI ENDOSCOPY,BALL DIL,30MM  10/26/2016         UPPER GI ENDOSCOPY,DILATN W GUIDE  2/27/2017           Social History     Social History    Marital status:      Spouse name: N/A    Number of children: N/A    Years of education: N/A     Social History Main Topics    Smoking status: Former Smoker     Quit date: 9/30/1980    Smokeless tobacco: Never Used    Alcohol use No    Drug use: No    Sexual activity: Not Asked     Other Topics Concern    None     Social History Narrative      Family History   Problem Relation Age of Onset    Cancer Father      colon    Kidney Disease Mother     Anesth Problems Neg Hx     There is no problem list on file for this patient. Allergies: No Known Allergies  Medications:   Prior to Admission medications    Medication Sig Start Date End Date Taking? Authorizing Provider   amLODIPine (NORVASC) 10 mg tablet Take 10 mg by mouth every morning. Yes Historical Provider   atorvastatin (LIPITOR) 20 mg tablet Take 20 mg by mouth nightly. Yes Historical Provider   levothyroxine (SYNTHROID) 75 mcg tablet Take 75 mcg by mouth Daily (before breakfast). Yes Historical Provider   acetaminophen (TYLENOL EXTRA STRENGTH) 500 mg tablet Take  by mouth as needed for Pain. Yes Historical Provider   MULTIVITAMIN WITH MINERALS (HAIR,SKIN AND NAILS PO) Take  by mouth two (2) times a day. Yes Historical Provider   aspirin 81 mg chewable tablet Take 81 mg by mouth daily. Yes Historical Provider   conjugated estrogens (PREMARIN) 0.625 mg/gram vaginal cream Insert 0.5 g into vagina daily. Yes Historical Provider   sucralfate (CARAFATE) 100 mg/mL suspension Take 10 mL by mouth four (4) times daily. 2/27/17  Yes Halina Siu MD   sucralfate (CARAFATE) 1 gram tablet Take 1 Tab by mouth four (4) times daily. Crush finely, mix with 10 mL of water, swallow 2/27/17  Yes Halina Siu MD   temazepam (RESTORIL) 15 mg capsule Take  by mouth nightly as needed for Sleep. Yes Historical Provider   NAPROXEN SODIUM (ALEVE PO) Take  by mouth as needed. Historical Provider     Physical Exam:   Vital Signs: Blood pressure 143/65, pulse 67, temperature 97.7 °F (36.5 °C), resp. rate 16, height 5' 2\" (1.575 m), weight 56.2 kg (124 lb), SpO2 98 %.   General: well developed, well nourished   HEENT: unremarkable   Heart: regular rhythm no mumur    Lungs: clear   Abdominal:  benign   Neurological: unremarkable   Extremities: no edema     Findings/Diagnosis: dysphagia, esoph stricture  Plan of Care/Planned Procedure: EGD with Dil with conscious/deep sedation    Signed:  Halina Siu MD 3/27/2017

## 2017-03-27 NOTE — IP AVS SNAPSHOT
[address] [facility] 
[phone] Patient: Cely Campbell MRN: [id number] ZYB:[id number] You are allergic to the following No active allergies Recent Documentation Height Weight BMI OB Status Smoking Status 1.575 m 56.2 kg 22.68 kg/m2 Hysterectomy Former Smoker Emergency Contacts Name Discharge Info Relation Home Work Mobile Ree Letters CAREGIVER [3] Spouse [3] [phone] About your hospitalization You were admitted on:  March 27, 2017 You last received care in the:  [facility] You were discharged on:  March 27, 2017 Unit phone number:  [phone] Why you were hospitalized Your primary diagnosis was:  Not on File Providers Seen During Your Hospitalizations Provider Role Specialty Primary office phone Farida Jenkins MD Attending Provider Gastroenterology [phone] Your Primary Care Physician (PCP) Primary Care Physician Office Phone Office Fax [address] [place] [address], [address] [place] [address] [phone] Follow-up Information Follow up With Details Comments Contact Info Iraida Lin MD   [address] [facility] 
[phone] Current Discharge Medication List  
  
CONTINUE these medications which have NOT CHANGED Dose & Instructions Dispensing Information Comments Morning Noon Evening Bedtime  
 aspirin 81 mg chewable tablet Your last dose was: Your next dose is:    
   
   
 Dose:  81 mg Take 81 mg by mouth daily. Refills:  0  
     
   
   
   
  
 atorvastatin 20 mg tablet Commonly known as:  LIPITOR Your last dose was: Your next dose is:    
   
   
 Dose:  20 mg Take 20 mg by mouth nightly. Refills:  0  
     
   
   
   
  
 HAIR,SKIN AND NAILS PO Your last dose was: Your next dose is: Take  by mouth two (2) times a day. Refills:  0  
     
   
   
   
  
 levothyroxine 75 mcg tablet Commonly known as:  SYNTHROID Your last dose was: Your next dose is:    
   
   
 Dose:  75 mcg Take 75 mcg by mouth Daily (before breakfast). Refills:  0 NORVASC 10 mg tablet Generic drug:  amLODIPine Your last dose was: Your next dose is:    
   
   
 Dose:  10 mg Take 10 mg by mouth every morning. Refills:  0 PREMARIN 0.625 mg/gram vaginal cream  
Generic drug:  conjugated estrogens Your last dose was: Your next dose is:    
   
   
 Dose:  0.5 g Insert 0.5 g into vagina daily. Refills:  0  
     
   
   
   
  
 * sucralfate 100 mg/mL suspension Commonly known as:  Brandy Hashimoto Your last dose was: Your next dose is:    
   
   
 Dose:  1 g Take 10 mL by mouth four (4) times daily. Quantity:  1000 mL Refills:  3  
     
   
   
   
  
 * sucralfate 1 gram tablet Commonly known as:  Brandy Hashimoto Your last dose was: Your next dose is:    
   
   
 Dose:  1 g Take 1 Tab by mouth four (4) times daily. Crush finely, mix with 10 mL of water, swallow Quantity:  90 Tab Refills:  3  
     
   
   
   
  
 temazepam 15 mg capsule Commonly known as:  RESTORIL Your last dose was: Your next dose is: Take  by mouth nightly as needed for Sleep. Refills:  0  
     
   
   
   
  
 TYLENOL EXTRA STRENGTH 500 mg tablet Generic drug:  acetaminophen Your last dose was: Your next dose is: Take  by mouth as needed for Pain. Refills:  0  
     
   
   
   
  
 * Notice: This list has 2 medication(s) that are the same as other medications prescribed for you. Read the directions carefully, and ask your doctor or other care provider to review them with you. STOP taking these medications ALEVE PO Discharge Instructions Mimi Pool [id number] EGD DISCHARGE INSTRUCTIONS Discomfort: 
Sore throat- throat lozenges or warm salt water gargle 
redness at IV site- apply warm compress to area; if redness or soreness persist- contact your physician Gaseous discomfort- walking, belching will help relieve any discomfort You may not operate a vehicle for 12 hours You may not engage in an occupation involving machinery or appliances for rest of today You may not drink alcoholic beverages for at least 12 hours Avoid making any critical decisions for at least 24 hour DIET You may have minimal sips at this time-- do not eat or drink for two hours. You may eat and drink after you wake up You may resume your regular diet  however -  remember your colon is empty and a heavy meal will produce gas. Avoid these foods:  vegetables, fried / greasy foods, carbonated drinks MEDICATIONS: 
See attached - please resume the carafate! ACTIVITY You may resume your normal daily activities until tomorrow AM; 
Spend the remainder of the day resting -  avoid any strenuous activity. CALL M.D. ANY SIGN OF Increasing pain, nausea, vomiting Abdominal distension (swelling) New increased bleeding (oral or rectal) Fever (chills) Pain in chest area Bloody discharge from nose or mouth Shortness of breath You should not take any Advil, Aspirin, Ibuprofen, Motrin, Aleve, or Goodys for 10 days, ONLY  Tylenol as needed for pain. IMPRESSION: 
-- again, dilated with esophagus sized tubes. This time the esophagus remained more open than at our last visit one month ago, but was more narrow than the maximum we dilated last time. We need to try to dilate sooner next time, in about TWO weeks. Follow-up Instructions: 
 Call Dr. Deniz Hinson for the results of procedure / biopsy in 7-10 days Telephone # [phone] Repeat upper endoscopy in 2 weeks Kai Jackson MD 
 
  
 
 
 
 Discharge Orders None Introducing [facility]! Hai Zazueta introduces [employer] patient portal. Now you can access parts of your medical record, email your doctor's office, and request medication refills online. 1. In your internet browser, go to [email or web address] 2. Click on the First Time User? Click Here link in the Sign In box. You will see the New Member Sign Up page. 3. Enter your [employer] Access Code exactly as it appears below. You will not need to use this code after youve completed the sign-up process. If you do not sign up before the expiration date, you must request a new code. · [employer] Access Code: [email or web address] Expires: 4/4/2017 12:56 PM 
 
4. Enter the last four digits of your Social Security Number (xxxx) and Date of Birth (mm/dd/yyyy) as indicated and click Submit. You will be taken to the next sign-up page. 5. Create a [employer] ID. This will be your [employer] login ID and cannot be changed, so think of one that is secure and easy to remember. 6. Create a [employer] password. You can change your password at any time. 7. Enter your Password Reset Question and Answer. This can be used at a later time if you forget your password. 8. Enter your e-mail address. You will receive e-mail notification when new information is available in [address] 9. Click Sign Up. You can now view and download portions of your medical record. 10. Click the Download Summary menu link to download a portable copy of your medical information. If you have questions, please visit the Frequently Asked Questions section of the [employer] website. Remember, [employer] is NOT to be used for urgent needs. For medical emergencies, dial 911. Now available from your iPhone and Android! General Information Please provide this summary of care documentation to your next provider.  
  
  
    
    
 Patient Signature: ____________________________________________________________ Date:  ____________________________________________________________  
  
Althia Kras Provider Signature:  ____________________________________________________________ Date:  ____________________________________________________________

## 2017-04-12 ENCOUNTER — SURGERY (OUTPATIENT)
Age: 72
End: 2017-04-12

## 2017-04-12 ENCOUNTER — HOSPITAL ENCOUNTER (OUTPATIENT)
Age: 72
Setting detail: OUTPATIENT SURGERY
Discharge: HOME | End: 2017-04-12
Attending: INTERNAL MEDICINE | Admitting: INTERNAL MEDICINE
Payer: MEDICARE

## 2017-04-12 ENCOUNTER — ANESTHESIA EVENT (OUTPATIENT)
Dept: ENDOSCOPY | Age: 72
End: 2017-04-12
Payer: MEDICARE

## 2017-04-12 ENCOUNTER — ANESTHESIA (OUTPATIENT)
Dept: ENDOSCOPY | Age: 72
End: 2017-04-12
Payer: MEDICARE

## 2017-04-12 VITALS
TEMPERATURE: 97.4 F | RESPIRATION RATE: 16 BRPM | SYSTOLIC BLOOD PRESSURE: 146 MMHG | OXYGEN SATURATION: 98 % | HEIGHT: 62 IN | WEIGHT: 124.19 LBS | BODY MASS INDEX: 22.85 KG/M2 | DIASTOLIC BLOOD PRESSURE: 63 MMHG | HEART RATE: 65 BPM

## 2017-04-12 PROCEDURE — 77030018712 HC DEV BLLN INFL BSC -B: Performed by: INTERNAL MEDICINE

## 2017-04-12 PROCEDURE — 74011250636 HC RX REV CODE- 250/636: Performed by: INTERNAL MEDICINE

## 2017-04-12 PROCEDURE — 76040000019: Performed by: INTERNAL MEDICINE

## 2017-04-12 PROCEDURE — 76060000031 HC ANESTHESIA FIRST 0.5 HR: Performed by: INTERNAL MEDICINE

## 2017-04-12 PROCEDURE — 74011250636 HC RX REV CODE- 250/636

## 2017-04-12 RX ORDER — SODIUM CHLORIDE 9 MG/ML
25 INJECTION, SOLUTION INTRAVENOUS CONTINUOUS
Status: DISCONTINUED | OUTPATIENT
Start: 2017-04-12 | End: 2017-04-12 | Stop reason: HOSPADM

## 2017-04-12 RX ORDER — ATROPINE SULFATE 0.1 MG/ML
0.5 INJECTION INTRAVENOUS
Status: DISCONTINUED | OUTPATIENT
Start: 2017-04-12 | End: 2017-04-12 | Stop reason: HOSPADM

## 2017-04-12 RX ORDER — SODIUM CHLORIDE 0.9 % (FLUSH) 0.9 %
5-10 SYRINGE (ML) INJECTION AS NEEDED
Status: DISCONTINUED | OUTPATIENT
Start: 2017-04-12 | End: 2017-04-12 | Stop reason: HOSPADM

## 2017-04-12 RX ORDER — FLUMAZENIL 0.1 MG/ML
0.2 INJECTION INTRAVENOUS
Status: DISCONTINUED | OUTPATIENT
Start: 2017-04-12 | End: 2017-04-12 | Stop reason: HOSPADM

## 2017-04-12 RX ORDER — SUCRALFATE 1 G/10ML
1 SUSPENSION ORAL 4 TIMES DAILY
Qty: 1000 ML | Refills: 3 | Status: SHIPPED | OUTPATIENT
Start: 2017-04-12 | End: 2019-02-06 | Stop reason: ALTCHOICE

## 2017-04-12 RX ORDER — DEXTROMETHORPHAN/PSEUDOEPHED 2.5-7.5/.8
1.2 DROPS ORAL
Status: DISCONTINUED | OUTPATIENT
Start: 2017-04-12 | End: 2017-04-12 | Stop reason: HOSPADM

## 2017-04-12 RX ORDER — PROPOFOL 10 MG/ML
INJECTION, EMULSION INTRAVENOUS AS NEEDED
Status: DISCONTINUED | OUTPATIENT
Start: 2017-04-12 | End: 2017-04-12 | Stop reason: HOSPADM

## 2017-04-12 RX ORDER — SODIUM CHLORIDE 0.9 % (FLUSH) 0.9 %
5-10 SYRINGE (ML) INJECTION EVERY 8 HOURS
Status: DISCONTINUED | OUTPATIENT
Start: 2017-04-12 | End: 2017-04-12 | Stop reason: HOSPADM

## 2017-04-12 RX ORDER — NALOXONE HYDROCHLORIDE 0.4 MG/ML
0.4 INJECTION, SOLUTION INTRAMUSCULAR; INTRAVENOUS; SUBCUTANEOUS
Status: DISCONTINUED | OUTPATIENT
Start: 2017-04-12 | End: 2017-04-12 | Stop reason: HOSPADM

## 2017-04-12 RX ORDER — EPINEPHRINE 0.1 MG/ML
1 INJECTION INTRACARDIAC; INTRAVENOUS
Status: DISCONTINUED | OUTPATIENT
Start: 2017-04-12 | End: 2017-04-12 | Stop reason: HOSPADM

## 2017-04-12 RX ADMIN — PROPOFOL 20 MG: 10 INJECTION, EMULSION INTRAVENOUS at 11:27

## 2017-04-12 RX ADMIN — PROPOFOL 20 MG: 10 INJECTION, EMULSION INTRAVENOUS at 11:25

## 2017-04-12 RX ADMIN — PROPOFOL 30 MG: 10 INJECTION, EMULSION INTRAVENOUS at 11:26

## 2017-04-12 RX ADMIN — PROPOFOL 50 MG: 10 INJECTION, EMULSION INTRAVENOUS at 11:24

## 2017-04-12 RX ADMIN — SODIUM CHLORIDE 25 ML/HR: 900 INJECTION, SOLUTION INTRAVENOUS at 10:39

## 2017-04-12 NOTE — PERIOP NOTES
Esophagus dilation done via 38Fr. , 42 Fr.,48  fr. Savory dilator. No signs of subcutaneous crepitus noted.

## 2017-04-12 NOTE — DISCHARGE INSTRUCTIONS
Herb Magaña  [id number]  1945    EGD DISCHARGE INSTRUCTIONS  Discomfort:  Sore throat- throat lozenges or warm salt water gargle  redness at IV site- apply warm compress to area; if redness or soreness persist- contact your physician  Gaseous discomfort- walking, belching will help relieve any discomfort  You may not operate a vehicle for 12 hours  You may not engage in an occupation involving machinery or appliances for rest of today  You may not drink alcoholic beverages for at least 12 hours  Avoid making any critical decisions for at least 24 hour  DIET  You may have minimal sips at this time-- do not eat or drink for two hours. You may eat and drink after you wake up  You may resume your regular diet - however -  remember your colon is empty and a heavy meal will produce gas. Avoid these foods:  vegetables, fried / greasy foods, carbonated drinks    MEDICATIONS:  See attached - I wrote a new Rx for carafate, if you need it. ACTIVITY  You may resume your normal daily activities until tomorrow AM;  Spend the remainder of the day resting -  avoid any strenuous activity. CALL M.D. ANY SIGN OF   Increasing pain, nausea, vomiting  Abdominal distension (swelling)  New increased bleeding (oral or rectal)  Fever (chills)  Pain in chest area  Bloody discharge from nose or mouth  Shortness of breath    You should not take any Advil, Aspirin, Ibuprofen, Motrin, Aleve, or Goodys for 10 days, ONLY  Tylenol as needed for pain. IMPRESSION:  -- same stricturing, this time dilated MUCH bigger, to almost a normal sized esophagus! -- we should re-dilate in 2-3 weeks to maintain this esophagus open/stretch it a little more! Follow-up Instructions:   Call Dr. Merary Ch for the results of procedure / biopsy in 7-10 days   Telephone # [phone]  Repeat upper endoscopy in 2-3 weeks    Kari Ching MD       MyChart Activation    Thank you for requesting access to [address]  Please follow the instructions below to securely access and download your online medical record. [employer] allows you to send messages to your doctor, view your test results, renew your prescriptions, schedule appointments, and more. How Do I Sign Up? 1. In your internet browser, go to [email or web address]  2. Click on the First Time User? Click Here link in the Sign In box. You will be redirect to the New Member Sign Up page. 3. Enter your [employer] Access Code exactly as it appears below. You will not need to use this code after youve completed the sign-up process. If you do not sign up before the expiration date, you must request a new code. [employer] Access Code: [email or web address]  Expires: 2017  9:43 AM (This is the date your [employer] access code will )    4. Enter the last four digits of your Social Security Number (xxxx) and Date of Birth (mm/dd/yyyy) as indicated and click Submit. You will be taken to the next sign-up page. 5. Create a [employer] ID. This will be your [employer] login ID and cannot be changed, so think of one that is secure and easy to remember. 6. Create a [employer] password. You can change your password at any time. 7. Enter your Password Reset Question and Answer. This can be used at a later time if you forget your password. 8. Enter your e-mail address. You will receive e-mail notification when new information is available in [address] 9. Click Sign Up. You can now view and download portions of your medical record. 10. Click the Download Summary menu link to download a portable copy of your medical information. Additional Information    If you have questions, please visit the Frequently Asked Questions section of the [employer] website at [email or web address] Remember, [employer] is NOT to be used for urgent needs. For medical emergencies, dial 911.

## 2017-04-12 NOTE — ANESTHESIA PREPROCEDURE EVALUATION
Anesthetic History   No history of anesthetic complications            Review of Systems / Medical History  Patient summary reviewed, nursing notes reviewed and pertinent labs reviewed    Pulmonary                Comments: Former Smoker   Neuro/Psych   Within defined limits           Cardiovascular    Hypertension          Hyperlipidemia    Exercise tolerance: >4 METS     GI/Hepatic/Renal               Comments: ESOPHAGEAL STRICTURE  DYSPHAGIA Endo/Other    Diabetes  Hypothyroidism       Other Findings   Comments: Hx gum cancer         Physical Exam    Airway  Mallampati: III    Neck ROM: normal range of motion   Mouth opening: Diminished (comment)     Cardiovascular  Regular rate and rhythm,  S1 and S2 normal,  no murmur, click, rub, or gallop             Dental    Dentition: Full upper dentures and Implants     Pulmonary  Breath sounds clear to auscultation               Abdominal  GI exam deferred       Other Findings            Anesthetic Plan    ASA: 2  Anesthesia type: total IV anesthesia          Induction: Intravenous  Anesthetic plan and risks discussed with: Patient

## 2017-04-12 NOTE — H&P
Gastroenterology Outpatient History and Physical    Patient: Serafin Cr    Physician: Vira Almeida MD    Chief Complaint: dysphagia/esoph stricture  History of Present Illness: 73 yo F with recurrent esoph stricture, radiation related. History:  Past Medical History:   Diagnosis Date    Cancer ([facility] [address]     Gum (SURGERY/RADIATION)    Diabetes ([facility] [address]     no meds required; controlled by diet and weight    Hypertension     Ill-defined condition     no venipuncture left arm--hx skin graft    Thyroid disease       Past Surgical History:   Procedure Laterality Date    ABDOMEN SURGERY PROC UNLISTED  7/5/2013    Feeding tube placement ( has been removed)    HX GI  10/2016    esoph w/ dilatation    HX GI  1980's    gastric ulcer excision (1/3 stomach removed)    HX GYN      Hysterectomy    HX HEENT  2013    gum surgery    HX HEENT Bilateral     TUBES IN EARS    HX HEENT      TRACHEOSTOMY    HX ORTHOPAEDIC      broken wrist    HX OTHER SURGICAL  2013    left arm veins/tissue used for oral/facial reconstruction    UPPER GI ENDOSCOPY,BALL DIL,30MM  10/26/2016         UPPER GI ENDOSCOPY,DILATN W GUIDE  2/27/2017         UPPER GI ENDOSCOPY,DILATN W GUIDE  3/27/2017           Social History     Social History    Marital status:      Spouse name: N/A    Number of children: N/A    Years of education: N/A     Social History Main Topics    Smoking status: Former Smoker     Quit date: 9/30/1980    Smokeless tobacco: Never Used    Alcohol use No    Drug use: No    Sexual activity: Not Asked     Other Topics Concern    None     Social History Narrative      Family History   Problem Relation Age of Onset    Cancer Father      colon    Kidney Disease Mother     Anesth Problems Neg Hx     There is no problem list on file for this patient. Allergies: No Known Allergies  Medications:   Prior to Admission medications    Medication Sig Start Date End Date Taking?  Authorizing Provider   peg 400-propylene glycol (SYSTANE, PROPYLENE GLYCOL,) 0.4-0.3 % drop Administer  to left eye as needed. Yes Historical Provider   amLODIPine (NORVASC) 10 mg tablet Take 10 mg by mouth every morning. Yes Historical Provider   atorvastatin (LIPITOR) 20 mg tablet Take 20 mg by mouth nightly. Yes Historical Provider   levothyroxine (SYNTHROID) 75 mcg tablet Take 75 mcg by mouth Daily (before breakfast). Yes Historical Provider   acetaminophen (TYLENOL EXTRA STRENGTH) 500 mg tablet Take  by mouth as needed for Pain. Yes Historical Provider   MULTIVITAMIN WITH MINERALS (HAIR,SKIN AND NAILS PO) Take  by mouth two (2) times a day. Yes Historical Provider   aspirin 81 mg chewable tablet Take 81 mg by mouth daily. Yes Historical Provider   conjugated estrogens (PREMARIN) 0.625 mg/gram vaginal cream Insert 0.5 g into vagina daily. Yes Historical Provider   sucralfate (CARAFATE) 100 mg/mL suspension Take 10 mL by mouth four (4) times daily. 2/27/17  Yes Mayela Patel MD   temazepam (RESTORIL) 15 mg capsule Take  by mouth nightly as needed for Sleep. Yes Historical Provider     Physical Exam:   Vital Signs: Blood pressure 142/63, pulse 74, temperature 97.8 °F (36.6 °C), resp. rate 18, height 5' 2\" (1.575 m), weight 56.3 kg (124 lb 3 oz), SpO2 98 %, not currently breastfeeding.   General: well developed, well nourished   HEENT: unremarkable   Heart: regular rhythm no mumur    Lungs: clear   Abdominal:  benign   Neurological: unremarkable   Extremities: no edema     Findings/Diagnosis: dysphagia, esoph stricture  Plan of Care/Planned Procedure: EGD with conscious/deep sedation    Signed:  Mayela Patel MD 4/12/2017

## 2017-04-12 NOTE — PERIOP NOTES
Endoscope was pre-cleaned at bedside immediately following procedure by Chris Lane RN. Cielo Jacobsen

## 2017-04-12 NOTE — IP AVS SNAPSHOT
[address] [facility] 
[phone] Patient: Dasie Nyhan MRN: [id number] ROU:[birth year]30 You are allergic to the following No active allergies Recent Documentation Height Weight Breastfeeding? BMI OB Status Smoking Status 1.575 m 56.3 kg No 22.71 kg/m2 Hysterectomy Former Smoker Emergency Contacts Name Discharge Info Relation Home Work Mobile Ashok Sicard CAREGIVER [3] Spouse [3] [phone] About your hospitalization You were admitted on:  April 12, 2017 You last received care in the:  [facility] ENDOSCOPY You were discharged on:  April 12, 2017 Unit phone number:  [phone] Why you were hospitalized Your primary diagnosis was:  Not on File Providers Seen During Your Hospitalizations Provider Role Specialty Primary office phone Nissa Lubin MD Attending Provider Gastroenterology [phone] Your Primary Care Physician (PCP) Primary Care Physician Office Phone Office Fax [address] [place] [address], [address] [place] [address] [phone] Follow-up Information Follow up With Details Comments Contact Info Carlos Suh MD   [address] [facility] 
[phone] Current Discharge Medication List  
  
CONTINUE these medications which have CHANGED Dose & Instructions Dispensing Information Comments Morning Noon Evening Bedtime * sucralfate 100 mg/mL suspension Commonly known as:  Margaret Caceres What changed:  Another medication with the same name was added. Make sure you understand how and when to take each. Your last dose was: Your next dose is:    
   
   
 Dose:  1 g Take 10 mL by mouth four (4) times daily. Quantity:  1000 mL Refills:  3  
     
   
   
   
  
 * sucralfate 100 mg/mL suspension Commonly known as:  Margaret Caceres  
 What changed: You were already taking a medication with the same name, and this prescription was added. Make sure you understand how and when to take each. Your last dose was: Your next dose is:    
   
   
 Dose:  1 g Take 10 mL by mouth four (4) times daily. Quantity:  1000 mL Refills:  3  
     
   
   
   
  
 * Notice: This list has 2 medication(s) that are the same as other medications prescribed for you. Read the directions carefully, and ask your doctor or other care provider to review them with you. CONTINUE these medications which have NOT CHANGED Dose & Instructions Dispensing Information Comments Morning Noon Evening Bedtime  
 aspirin 81 mg chewable tablet Your last dose was: Your next dose is:    
   
   
 Dose:  81 mg Take 81 mg by mouth daily. Refills:  0  
     
   
   
   
  
 atorvastatin 20 mg tablet Commonly known as:  LIPITOR Your last dose was: Your next dose is:    
   
   
 Dose:  20 mg Take 20 mg by mouth nightly. Refills:  0  
     
   
   
   
  
 HAIR,SKIN AND NAILS PO Your last dose was: Your next dose is: Take  by mouth two (2) times a day. Refills:  0  
     
   
   
   
  
 levothyroxine 75 mcg tablet Commonly known as:  SYNTHROID Your last dose was: Your next dose is:    
   
   
 Dose:  75 mcg Take 75 mcg by mouth Daily (before breakfast). Refills:  0 NORVASC 10 mg tablet Generic drug:  amLODIPine Your last dose was: Your next dose is:    
   
   
 Dose:  10 mg Take 10 mg by mouth every morning. Refills:  0 PREMARIN 0.625 mg/gram vaginal cream  
Generic drug:  conjugated estrogens Your last dose was: Your next dose is:    
   
   
 Dose:  0.5 g Insert 0.5 g into vagina daily. Refills:  0  
     
   
   
   
  
 SYSTANE (PROPYLENE GLYCOL) 0.4-0.3 % Drop Generic drug:  peg 400-propylene glycol Your last dose was: Your next dose is:    
   
   
 Administer  to left eye as needed. Refills:  0  
     
   
   
   
  
 temazepam 15 mg capsule Commonly known as:  RESTORIL Your last dose was: Your next dose is: Take  by mouth nightly as needed for Sleep. Refills:  0  
     
   
   
   
  
 TYLENOL EXTRA STRENGTH 500 mg tablet Generic drug:  acetaminophen Your last dose was: Your next dose is: Take  by mouth as needed for Pain. Refills:  0 Where to Get Your Medications Information on where to get these meds will be given to you by the nurse or doctor. ! Ask your nurse or doctor about these medications  
  sucralfate 100 mg/mL suspension Discharge Instructions [facility] [id number] EGD DISCHARGE INSTRUCTIONS Discomfort: 
Sore throat- throat lozenges or warm salt water gargle 
redness at IV site- apply warm compress to area; if redness or soreness persist- contact your physician Gaseous discomfort- walking, belching will help relieve any discomfort You may not operate a vehicle for 12 hours You may not engage in an occupation involving machinery or appliances for rest of today You may not drink alcoholic beverages for at least 12 hours Avoid making any critical decisions for at least 24 hour DIET You may have minimal sips at this time-- do not eat or drink for two hours. You may eat and drink after you wake up You may resume your regular diet  however -  remember your colon is empty and a heavy meal will produce gas. Avoid these foods:  vegetables, fried / greasy foods, carbonated drinks MEDICATIONS: 
See attached - I wrote a new Rx for carafate, if you need it. ACTIVITY You may resume your normal daily activities until tomorrow AM; 
 Spend the remainder of the day resting -  avoid any strenuous activity. CALL M.D. ANY SIGN OF Increasing pain, nausea, vomiting Abdominal distension (swelling) New increased bleeding (oral or rectal) Fever (chills) Pain in chest area Bloody discharge from nose or mouth Shortness of breath You should not take any Advil, Aspirin, Ibuprofen, Motrin, Aleve, or Goodys for 10 days, ONLY  Tylenol as needed for pain. IMPRESSION: 
-- same stricturing, this time dilated MUCH bigger, to almost a normal sized esophagus! -- we should re-dilate in 2-3 weeks to maintain this esophagus open/stretch it a little more! Follow-up Instructions: 
 Call Dr. Riley Garsia for the results of procedure / biopsy in 7-10 days Telephone # [phone] Repeat upper endoscopy in 2-3 weeks Jacek Carrera MD 
 
  
[employer] Activation Thank you for requesting access to [employer]. Please follow the instructions below to securely access and download your online medical record. [employer] allows you to send messages to your doctor, view your test results, renew your prescriptions, schedule appointments, and more. How Do I Sign Up? 1. In your internet browser, go to [email or web address] 
2. Click on the First Time User? Click Here link in the Sign In box. You will be redirect to the New Member Sign Up page. 3. Enter your [employer] Access Code exactly as it appears below. You will not need to use this code after youve completed the sign-up process. If you do not sign up before the expiration date, you must request a new code. [employer] Access Code: [email or web address] Expires: 2017  9:43 AM (This is the date your [employer] access code will ) 4. Enter the last four digits of your Social Security Number (xxxx) and Date of Birth (mm/dd/yyyy) as indicated and click Submit. You will be taken to the next sign-up page. 5. Create a [employer] ID.  This will be your [employer] login ID and cannot be changed, so think of one that is secure and easy to remember. 6. Create a [employer] password. You can change your password at any time. 7. Enter your Password Reset Question and Answer. This can be used at a later time if you forget your password. 8. Enter your e-mail address. You will receive e-mail notification when new information is available in [address] 9. Click Sign Up. You can now view and download portions of your medical record. 10. Click the Download Summary menu link to download a portable copy of your medical information. Additional Information If you have questions, please visit the Frequently Asked Questions section of the [employer] website at [email or web address] Remember, [employer] is NOT to be used for urgent needs. For medical emergencies, dial 911. Discharge Orders None Introducing [facility]! Karime Arcos introduces [employer] patient portal. Now you can access parts of your medical record, email your doctor's office, and request medication refills online. 1. In your internet browser, go to [email or web address] 2. Click on the First Time User? Click Here link in the Sign In box. You will see the New Member Sign Up page. 3. Enter your [employer] Access Code exactly as it appears below. You will not need to use this code after youve completed the sign-up process. If you do not sign up before the expiration date, you must request a new code. · [employer] Access Code: [email or web address] Expires: 7/11/2017  9:43 AM 
 
4. Enter the last four digits of your Social Security Number (xxxx) and Date of Birth (mm/dd/yyyy) as indicated and click Submit. You will be taken to the next sign-up page. 5. Create a [employer] ID. This will be your [employer] login ID and cannot be changed, so think of one that is secure and easy to remember. 6. Create a [employer] password. You can change your password at any time. 7. Enter your Password Reset Question and Answer. This can be used at a later time if you forget your password. 8. Enter your e-mail address. You will receive e-mail notification when new information is available in [address] 9. Click Sign Up. You can now view and download portions of your medical record. 10. Click the Download Summary menu link to download a portable copy of your medical information. If you have questions, please visit the Frequently Asked Questions section of the [employer] website. Remember, [employer] is NOT to be used for urgent needs. For medical emergencies, dial 911. Now available from your iPhone and Android! General Information Please provide this summary of care documentation to your next provider. Patient Signature:  ____________________________________________________________ Date:  ____________________________________________________________  
  
Coreen Hope Provider Signature:  ____________________________________________________________ Date:  ____________________________________________________________

## 2017-04-12 NOTE — PROGRESS NOTES
Sarah Gastelum  1945  [id number]    Situation:  Verbal report received from: Joe Blankenship rn  Procedure: Procedure(s):  ESOPHAGOGASTRODUODENOSCOPY (EGD)  ESOPHAGEAL DILATION    Background:    Preoperative diagnosis: ESOPHAGEAL STRICTURE/DYSPHAGIA  Postoperative diagnosis: esophageal stricture    :  Dr. Cruz Cardoso  Assistant(s): Endoscopy RN-1: Joe Blankenship  Endoscopy RN-2: Gayathri Durand    Specimens: * No specimens in log *  H. Pylori  no    Assessment:  Intra-procedure medications     Anesthesia gave intra-procedure sedation and medications, see anesthesia flow sheet yes    Intravenous fluids: NS@ KVO     Vital signs stable  yes    Abdominal assessment: round and soft  yes    Recommendation:  Discharge patient per MD order yes.   Family or Friend  yes  Permission to share finding with family or friend yes

## 2017-04-12 NOTE — PROCEDURES
NAME:  Mika Jimenez   :   1945   MRN:   [id number]     Date/Time:  2017 10:05 AM    Esophagogastroduodenoscopy (EGD) Procedure Note    Procedure: Esophagogastroduodenoscopy with esophageal dilation    Indication: Esophageal stricture, likely from radiation  Pre-operative Diagnosis: see indication above  Post-operative Diagnosis: see findings below  :  Deann Loyola MD  Referring Provider:   -Stephanie Mckeon MD    Exam:  Airway: clear, no airway problems anticipated  Heart: RRR, without gallops or rubs  Lungs: clear bilaterally without wheezes, crackles, or rhonchi  Abdomen: soft, nontender, nondistended, bowel sounds present  Mental Status: awake, alert and oriented to person, place and time     Anethesia/Sedation:  MAC anesthesia Propofol  Procedure Details   After informed consent was obtained for the procedure, with all risks and benefits of procedure explained the patient was taken to the endoscopy suite and placed in the left lateral decubitus position. Following sequential administration of sedation as per above, the [id number] gastroscope was inserted into the mouth and advanced under direct vision to second portion of the duodenum. A careful inspection was made as the gastroscope was withdrawn, including a retroflexed view of the proximal stomach; findings and interventions are described below. Findings:   OROPHARYNX: Cords and pyriform recesses with some edema  ESOPHAGUS:   -- mid to distal esophagus with multiple concentric narrowed rings, and too narrow to allow passage of the upper endoscope with mild resistance. -- dilated to 39 fr, 43 fr and 48 fr with noted shear effect on re-look endoscopy. Dilation with the bougie dilators over the guidewire. -- scope still felt snug, but was able to slide into gastric lumen and continue exam after dilation. STOMACH: The fundus on antegrade and retroflex views reveals a hiatal hernia.  The body is normal. Antrum and pylorus are surgically absent. DUODENUM: The bulb and second portions are normal.    Therapies:  esophageal dilation with 39 fr, 43fr  And 48 fr bougie dilators    Specimens: none    EBL:  None. Complications:   None; patient tolerated the procedure well. Impression:    -- narrowed, concentric stricturing in mid and distal esophagus, dilated to max of 48 fr with bougie dilators over the wire, with moderate resistance and blood, shear effect on re-look endoscopy  -- hiatal hernia  -- history of radiation, the likely source of esophageal stricturing  -- surgical changes from previous antrectomy    Recommendations:  -- Continue acid suppression.   -- repeat EGD in 2 weeks for repeat dilation  -- Carafate QID x 10 days    Discharge disposition:  Home in the company of  when able to ambulate    Nissa Lubin MD

## 2017-05-01 ENCOUNTER — ANESTHESIA (OUTPATIENT)
Dept: ENDOSCOPY | Age: 72
End: 2017-05-01
Payer: MEDICARE

## 2017-05-01 ENCOUNTER — ANESTHESIA EVENT (OUTPATIENT)
Dept: ENDOSCOPY | Age: 72
End: 2017-05-01
Payer: MEDICARE

## 2017-05-01 ENCOUNTER — HOSPITAL ENCOUNTER (OUTPATIENT)
Age: 72
Setting detail: OUTPATIENT SURGERY
Discharge: HOME | End: 2017-05-01
Attending: INTERNAL MEDICINE | Admitting: INTERNAL MEDICINE
Payer: MEDICARE

## 2017-05-01 VITALS
HEIGHT: 62 IN | DIASTOLIC BLOOD PRESSURE: 65 MMHG | OXYGEN SATURATION: 100 % | RESPIRATION RATE: 13 BRPM | TEMPERATURE: 97.5 F | HEART RATE: 82 BPM | BODY MASS INDEX: 22.82 KG/M2 | SYSTOLIC BLOOD PRESSURE: 129 MMHG | WEIGHT: 124 LBS

## 2017-05-01 PROCEDURE — 74011000250 HC RX REV CODE- 250

## 2017-05-01 PROCEDURE — 74011250636 HC RX REV CODE- 250/636

## 2017-05-01 PROCEDURE — 74011250636 HC RX REV CODE- 250/636: Performed by: INTERNAL MEDICINE

## 2017-05-01 PROCEDURE — 76040000019: Performed by: INTERNAL MEDICINE

## 2017-05-01 PROCEDURE — 76060000031 HC ANESTHESIA FIRST 0.5 HR: Performed by: INTERNAL MEDICINE

## 2017-05-01 RX ORDER — EPINEPHRINE 0.1 MG/ML
1 INJECTION INTRACARDIAC; INTRAVENOUS
Status: DISCONTINUED | OUTPATIENT
Start: 2017-05-01 | End: 2017-05-01 | Stop reason: HOSPADM

## 2017-05-01 RX ORDER — NALOXONE HYDROCHLORIDE 0.4 MG/ML
0.4 INJECTION, SOLUTION INTRAMUSCULAR; INTRAVENOUS; SUBCUTANEOUS
Status: DISCONTINUED | OUTPATIENT
Start: 2017-05-01 | End: 2017-05-01 | Stop reason: HOSPADM

## 2017-05-01 RX ORDER — GLYCOPYRROLATE 0.2 MG/ML
INJECTION INTRAMUSCULAR; INTRAVENOUS AS NEEDED
Status: DISCONTINUED | OUTPATIENT
Start: 2017-05-01 | End: 2017-05-01 | Stop reason: HOSPADM

## 2017-05-01 RX ORDER — MIDAZOLAM HYDROCHLORIDE 1 MG/ML
.25-5 INJECTION, SOLUTION INTRAMUSCULAR; INTRAVENOUS
Status: DISCONTINUED | OUTPATIENT
Start: 2017-05-01 | End: 2017-05-01 | Stop reason: HOSPADM

## 2017-05-01 RX ORDER — FENTANYL CITRATE 50 UG/ML
50 INJECTION, SOLUTION INTRAMUSCULAR; INTRAVENOUS
Status: DISCONTINUED | OUTPATIENT
Start: 2017-05-01 | End: 2017-05-01 | Stop reason: HOSPADM

## 2017-05-01 RX ORDER — FLUMAZENIL 0.1 MG/ML
0.2 INJECTION INTRAVENOUS
Status: DISCONTINUED | OUTPATIENT
Start: 2017-05-01 | End: 2017-05-01 | Stop reason: HOSPADM

## 2017-05-01 RX ORDER — DEXTROMETHORPHAN/PSEUDOEPHED 2.5-7.5/.8
1.2 DROPS ORAL
Status: DISCONTINUED | OUTPATIENT
Start: 2017-05-01 | End: 2017-05-01 | Stop reason: HOSPADM

## 2017-05-01 RX ORDER — SODIUM CHLORIDE 9 MG/ML
25 INJECTION, SOLUTION INTRAVENOUS CONTINUOUS
Status: DISCONTINUED | OUTPATIENT
Start: 2017-05-01 | End: 2017-05-01 | Stop reason: HOSPADM

## 2017-05-01 RX ORDER — SODIUM CHLORIDE 0.9 % (FLUSH) 0.9 %
5-10 SYRINGE (ML) INJECTION AS NEEDED
Status: DISCONTINUED | OUTPATIENT
Start: 2017-05-01 | End: 2017-05-01 | Stop reason: HOSPADM

## 2017-05-01 RX ORDER — LIDOCAINE HYDROCHLORIDE 20 MG/ML
INJECTION, SOLUTION EPIDURAL; INFILTRATION; INTRACAUDAL; PERINEURAL AS NEEDED
Status: DISCONTINUED | OUTPATIENT
Start: 2017-05-01 | End: 2017-05-01 | Stop reason: HOSPADM

## 2017-05-01 RX ORDER — SODIUM CHLORIDE 0.9 % (FLUSH) 0.9 %
5-10 SYRINGE (ML) INJECTION EVERY 8 HOURS
Status: DISCONTINUED | OUTPATIENT
Start: 2017-05-01 | End: 2017-05-01 | Stop reason: HOSPADM

## 2017-05-01 RX ORDER — ATROPINE SULFATE 0.1 MG/ML
0.5 INJECTION INTRAVENOUS
Status: DISCONTINUED | OUTPATIENT
Start: 2017-05-01 | End: 2017-05-01 | Stop reason: HOSPADM

## 2017-05-01 RX ORDER — PROPOFOL 10 MG/ML
INJECTION, EMULSION INTRAVENOUS AS NEEDED
Status: DISCONTINUED | OUTPATIENT
Start: 2017-05-01 | End: 2017-05-01 | Stop reason: HOSPADM

## 2017-05-01 RX ADMIN — GLYCOPYRROLATE 0.2 MG: 0.2 INJECTION INTRAMUSCULAR; INTRAVENOUS at 10:08

## 2017-05-01 RX ADMIN — LIDOCAINE HYDROCHLORIDE 60 MG: 20 INJECTION, SOLUTION EPIDURAL; INFILTRATION; INTRACAUDAL; PERINEURAL at 10:08

## 2017-05-01 RX ADMIN — SODIUM CHLORIDE 25 ML/HR: 900 INJECTION, SOLUTION INTRAVENOUS at 09:52

## 2017-05-01 RX ADMIN — PROPOFOL 120 MG: 10 INJECTION, EMULSION INTRAVENOUS at 10:14

## 2017-05-01 NOTE — PROCEDURES
NAME:  Luciano Reilly   :   1945   MRN:   [id number]     Date/Time:  2017 10:05 AM    Esophagogastroduodenoscopy (EGD) Procedure Note    Procedure: Esophagogastroduodenoscopy with esophageal dilation    Indication: Esophageal stricture, likely from radiation  Pre-operative Diagnosis: see indication above  Post-operative Diagnosis: see findings below  :  Yanira Zuniga MD  Referring Provider:   -Madeline Patel MD    Exam:  Airway: clear, no airway problems anticipated  Heart: RRR, without gallops or rubs  Lungs: clear bilaterally without wheezes, crackles, or rhonchi  Abdomen: soft, nontender, nondistended, bowel sounds present  Mental Status: awake, alert and oriented to person, place and time     Anethesia/Sedation:  MAC anesthesia Propofol  Procedure Details   After informed consent was obtained for the procedure, with all risks and benefits of procedure explained the patient was taken to the endoscopy suite and placed in the left lateral decubitus position. Following sequential administration of sedation as per above, the [id number] gastroscope was inserted into the mouth and advanced under direct vision to second portion of the duodenum. A careful inspection was made as the gastroscope was withdrawn, including a retroflexed view of the proximal stomach; findings and interventions are described below. Findings:   OROPHARYNX: Cords and pyriform recesses with some edema  ESOPHAGUS:   -- mid to distal esophagus with multiple concentric narrowed rings, and too narrow to allow passage of the upper endoscope with mild resistance. -- dilated to 48 fr and 51 fr, with noted resistance on insertion, utilizing the bougie dilators over the guidewire. -- scope still felt snug, but was able to slide into gastric lumen prior to dilation this time. STOMACH: The fundus on antegrade and retroflex views reveals a hiatal hernia.  The body is normal. Antrum and pylorus are surgically absent. SMALL INTESTINE: normal mucosa    Therapies:  esophageal dilation with 48 fr and 51 fr bougie dilators    Specimens: none    EBL:  None. Complications:   None; patient tolerated the procedure well. Impression:    -- narrowed, concentric stricturing in mid and distal esophagus, dilated to max of 51 fr with bougie dilators over the wire, with moderate resistance and blood on dilator. -- hiatal hernia  -- history of radiation, the likely source of esophageal stricturing  -- surgical changes from previous antrectomy    Recommendations:  -- Continue acid suppression.   -- repeat EGD as needed  -- Carafate QID x 10 days  -- follow up with me in the office in ~8 weeks    Discharge disposition:  Home in the company of  when able to ambulate    Kamaljit Turpin MD

## 2017-05-01 NOTE — ANESTHESIA PREPROCEDURE EVALUATION
Anesthetic History   No history of anesthetic complications            Review of Systems / Medical History  Patient summary reviewed, nursing notes reviewed and pertinent labs reviewed    Pulmonary                Comments: Former Smoker   Neuro/Psych   Within defined limits           Cardiovascular    Hypertension          Hyperlipidemia    Exercise tolerance: >4 METS     GI/Hepatic/Renal               Comments: ESOPHAGEAL STRICTURE  DYSPHAGIA Endo/Other    Diabetes  Hypothyroidism       Other Findings   Comments: Hx gum cancer           Physical Exam    Airway  Mallampati: III    Neck ROM: normal range of motion   Mouth opening: Diminished (comment)     Cardiovascular  Regular rate and rhythm,  S1 and S2 normal,  no murmur, click, rub, or gallop             Dental    Dentition: Full upper dentures and Implants     Pulmonary  Breath sounds clear to auscultation               Abdominal  GI exam deferred       Other Findings            Anesthetic Plan    ASA: 2  Anesthesia type: total IV anesthesia and general          Induction: Intravenous  Anesthetic plan and risks discussed with: Patient

## 2017-05-01 NOTE — PERIOP NOTES
Esophagus dilation done via 48 fr., and 51 fr. Savory dilator. No signs of subcutaneous crepitus noted.

## 2017-05-01 NOTE — ANESTHESIA POSTPROCEDURE EVALUATION
Post-Anesthesia Evaluation and Assessment    Patient: Mayo Griffin MRN: [id number]  SSN: [id number]    YOB: 1945  Age: 74 y.o. Sex: female       Cardiovascular Function/Vital Signs  Visit Vitals    /65    Pulse 82    Temp 36.4 °C (97.5 °F)    Resp 13    Ht 5' 2\" (1.575 m)    Wt 56.2 kg (124 lb)    SpO2 100%    BMI 22.68 kg/m2       Patient is status post total IV anesthesia anesthesia for Procedure(s):  ESOPHAGOGASTRODUODENOSCOPY (EGD)  ESOPHAGEAL DILATION. Nausea/Vomiting: None    Postoperative hydration reviewed and adequate. Pain:  Pain Scale 1: Numeric (0 - 10) (05/01/17 1043)  Pain Intensity 1: 0 (05/01/17 1043)   Managed    Neurological Status: At baseline    Mental Status and Level of Consciousness: Arousable    Pulmonary Status:   O2 Device: Room air (05/01/17 1043)   Adequate oxygenation and airway patent    Complications related to anesthesia: None    Post-anesthesia assessment completed.  No concerns    Signed By: Lexi Colby DO     May 1, 2017

## 2017-05-01 NOTE — DISCHARGE INSTRUCTIONS
Sanket Longo  [id number]  1945    EGD DISCHARGE INSTRUCTIONS  Discomfort:  Sore throat- throat lozenges or warm salt water gargle  redness at IV site- apply warm compress to area; if redness or soreness persist- contact your physician  Gaseous discomfort- walking, belching will help relieve any discomfort  You may not operate a vehicle for 12 hours  You may not engage in an occupation involving machinery or appliances for rest of today  You may not drink alcoholic beverages for at least 12 hours  Avoid making any critical decisions for at least 24 hour  DIET  You may have minimal sips at this time-- do not eat or drink for two hours. You may eat and drink after you wake up  You may resume your regular diet - however -  remember your colon is empty and a heavy meal will produce gas. Avoid these foods:  vegetables, fried / greasy foods, carbonated drinks    MEDICATIONS:  See attached    ACTIVITY  You may resume your normal daily activities until tomorrow AM;  Spend the remainder of the day resting -  avoid any strenuous activity. CALL M.D. ANY SIGN OF   Increasing pain, nausea, vomiting  Abdominal distension (swelling)  New increased bleeding (oral or rectal)  Fever (chills)  Pain in chest area  Bloody discharge from nose or mouth  Shortness of breath    You should NOT take any Advil, Aspirin, Ibuprofen, Motrin, Aleve, or Goodys for 10 days, ONLY  Tylenol as needed for pain. IMPRESSION:  -- stricturing in the esophagus is much improved! !  -- we stretched a full cm further today, so it should be even better. It was tight on the last stretching, but should make a big difference  -- I think we can give it a rest now for awhile, and you can see me in the office in 2-3 months and no more scheduled dilations for now!     Follow-up Instructions:   Call Dr. Selene Langford for questions/follow up   Telephone # [phone]  Appointment in 2-3 months    Gilson Bell MD

## 2017-05-01 NOTE — PROGRESS NOTES
Torres Maggie  1945  [id number]    Situation:  Verbal report received from: Pedrito Nguyễn RN  Procedure: Procedure(s):  ESOPHAGOGASTRODUODENOSCOPY (EGD)  ESOPHAGEAL DILATION    Background:    Preoperative diagnosis: Radiation induced dysplagia  Postoperative diagnosis: esophageal stricture; dysphagia    :  Dr. Tamy Loredo  Assistant(s): Endoscopy RN-1: Irene Renee    Specimens: * No specimens in log *  H. Pylori  no    Assessment:  Intra-procedure medications :  Propofol 120 mg    Anesthesia gave intra-procedure sedation and medications, see anesthesia flow sheet yes    Intravenous fluids: NS@ KVO     Vital signs stable     Abdominal assessment: round and soft     Recommendation:  Discharge patient per MD order. Family here.   Permission to share finding with family or friend yes

## 2017-05-01 NOTE — H&P
Gastroenterology Outpatient History and Physical    Patient: Anya Nur    Physician: Kiko Jaramillo MD    Chief Complaint: dysphagia  History of Present Illness: 75 yo F with radiation induced strictures. Repeat dilations have imrpoved. History:  Past Medical History:   Diagnosis Date    Cancer ([facility] [address]     Gum (SURGERY/RADIATION)    Diabetes ([facility] [address]     no meds required; controlled by diet and weight    Hypertension     Ill-defined condition     no venipuncture left arm--hx skin graft    Thyroid disease       Past Surgical History:   Procedure Laterality Date    ABDOMEN SURGERY PROC UNLISTED  7/5/2013    Feeding tube placement ( has been removed)    HX GI  10/2016    esoph w/ dilatation    HX GI  1980's    gastric ulcer excision (1/3 stomach removed)    HX GYN      Hysterectomy    HX HEENT  2013    gum surgery    HX HEENT Bilateral     TUBES IN EARS    HX HEENT      TRACHEOSTOMY    HX ORTHOPAEDIC      broken wrist    HX OTHER SURGICAL  2013    left arm veins/tissue used for oral/facial reconstruction    UPPER GI ENDOSCOPY,BALL DIL,30MM  10/26/2016         UPPER GI ENDOSCOPY,DILATN W GUIDE  2/27/2017         UPPER GI ENDOSCOPY,DILATN W GUIDE  3/27/2017         UPPER GI ENDOSCOPY,DILATN W GUIDE  4/12/2017           Social History     Social History    Marital status:      Spouse name: N/A    Number of children: N/A    Years of education: N/A     Social History Main Topics    Smoking status: Former Smoker     Quit date: 9/30/1980    Smokeless tobacco: Never Used    Alcohol use No    Drug use: No    Sexual activity: Not Asked     Other Topics Concern    None     Social History Narrative      Family History   Problem Relation Age of Onset    Cancer Father      colon    Kidney Disease Mother     Anesth Problems Neg Hx     There is no problem list on file for this patient.       Allergies: No Known Allergies  Medications:   Prior to Admission medications    Medication Sig Start Date End Date Taking? Authorizing Provider   sucralfate (CARAFATE) 100 mg/mL suspension Take 10 mL by mouth four (4) times daily. 4/12/17  Yes Dean Pollock MD   amLODIPine (NORVASC) 10 mg tablet Take 10 mg by mouth every morning. Yes Historical Provider   atorvastatin (LIPITOR) 20 mg tablet Take 20 mg by mouth nightly. Yes Historical Provider   levothyroxine (SYNTHROID) 75 mcg tablet Take 75 mcg by mouth Daily (before breakfast). Yes Historical Provider   MULTIVITAMIN WITH MINERALS (HAIR,SKIN AND NAILS PO) Take  by mouth two (2) times a day. Yes Historical Provider   conjugated estrogens (PREMARIN) 0.625 mg/gram vaginal cream Insert 0.5 g into vagina daily. Yes Historical Provider   sucralfate (CARAFATE) 100 mg/mL suspension Take 10 mL by mouth four (4) times daily. 2/27/17  Yes Dean Pollock MD   temazepam (RESTORIL) 15 mg capsule Take  by mouth nightly as needed for Sleep. Yes Historical Provider   peg 400-propylene glycol (SYSTANE, PROPYLENE GLYCOL,) 0.4-0.3 % drop Administer  to left eye as needed. Historical Provider   acetaminophen (TYLENOL EXTRA STRENGTH) 500 mg tablet Take  by mouth as needed for Pain. Historical Provider   aspirin 81 mg chewable tablet Take 81 mg by mouth daily. Historical Provider     Physical Exam:   Vital Signs: Blood pressure (!) 128/92, pulse 69, temperature 98.2 °F (36.8 °C), resp. rate 20, height 5' 2\" (1.575 m), weight 56.2 kg (124 lb), SpO2 97 %.   General: well developed, well nourished   HEENT: unremarkable   Heart: regular rhythm no mumur    Lungs: clear   Abdominal:  benign   Neurological: unremarkable   Extremities: no edema     Findings/Diagnosis: dysphagia/esoph stricture  Plan of Care/Planned Procedure: EGD with conscious/deep sedation    Signed:  Dean Pollock MD 5/1/2017

## 2017-08-18 NOTE — PERIOP NOTES
Patient called for pre-procedural interview. Patient was not willing to go over medications and history. She also stated that she did not need instructions as she \"had this procedure 6 or 8 times\". Patient verified appointment time and location. Patient also verified that she had her instruction for prep leading up to the procedure.

## 2017-08-21 ENCOUNTER — ANESTHESIA EVENT (OUTPATIENT)
Dept: ENDOSCOPY | Age: 72
End: 2017-08-21
Payer: MEDICARE

## 2017-08-21 ENCOUNTER — ANESTHESIA (OUTPATIENT)
Dept: ENDOSCOPY | Age: 72
End: 2017-08-21
Payer: MEDICARE

## 2017-08-21 ENCOUNTER — HOSPITAL ENCOUNTER (OUTPATIENT)
Age: 72
Setting detail: OUTPATIENT SURGERY
Discharge: HOME | End: 2017-08-21
Attending: INTERNAL MEDICINE | Admitting: INTERNAL MEDICINE
Payer: MEDICARE

## 2017-08-21 ENCOUNTER — APPOINTMENT (OUTPATIENT)
Dept: GENERAL RADIOLOGY | Age: 72
End: 2017-08-21
Payer: MEDICARE

## 2017-08-21 VITALS
SYSTOLIC BLOOD PRESSURE: 135 MMHG | OXYGEN SATURATION: 99 % | DIASTOLIC BLOOD PRESSURE: 56 MMHG | HEART RATE: 62 BPM | HEIGHT: 62 IN | TEMPERATURE: 97.7 F | WEIGHT: 127.25 LBS | BODY MASS INDEX: 23.42 KG/M2 | RESPIRATION RATE: 21 BRPM

## 2017-08-21 PROCEDURE — 74011000250 HC RX REV CODE- 250

## 2017-08-21 PROCEDURE — 74011250636 HC RX REV CODE- 250/636

## 2017-08-21 PROCEDURE — 77030018712 HC DEV BLLN INFL BSC -B: Performed by: INTERNAL MEDICINE

## 2017-08-21 PROCEDURE — 76040000019: Performed by: INTERNAL MEDICINE

## 2017-08-21 PROCEDURE — 74011250636 HC RX REV CODE- 250/636: Performed by: INTERNAL MEDICINE

## 2017-08-21 PROCEDURE — 76060000031 HC ANESTHESIA FIRST 0.5 HR: Performed by: INTERNAL MEDICINE

## 2017-08-21 RX ORDER — SODIUM CHLORIDE 0.9 % (FLUSH) 0.9 %
5-10 SYRINGE (ML) INJECTION AS NEEDED
Status: DISCONTINUED | OUTPATIENT
Start: 2017-08-21 | End: 2017-08-21 | Stop reason: HOSPADM

## 2017-08-21 RX ORDER — ATROPINE SULFATE 0.1 MG/ML
0.5 INJECTION INTRAVENOUS
Status: DISCONTINUED | OUTPATIENT
Start: 2017-08-21 | End: 2017-08-21 | Stop reason: HOSPADM

## 2017-08-21 RX ORDER — NALOXONE HYDROCHLORIDE 0.4 MG/ML
0.4 INJECTION, SOLUTION INTRAMUSCULAR; INTRAVENOUS; SUBCUTANEOUS
Status: DISCONTINUED | OUTPATIENT
Start: 2017-08-21 | End: 2017-08-21 | Stop reason: HOSPADM

## 2017-08-21 RX ORDER — SUCRALFATE 1 G/10ML
1 SUSPENSION ORAL 4 TIMES DAILY
Qty: 400 ML | Refills: 3 | Status: SHIPPED | OUTPATIENT
Start: 2017-08-21 | End: 2017-08-31

## 2017-08-21 RX ORDER — PROPOFOL 10 MG/ML
INJECTION, EMULSION INTRAVENOUS AS NEEDED
Status: DISCONTINUED | OUTPATIENT
Start: 2017-08-21 | End: 2017-08-21 | Stop reason: HOSPADM

## 2017-08-21 RX ORDER — LIDOCAINE HYDROCHLORIDE 20 MG/ML
INJECTION, SOLUTION EPIDURAL; INFILTRATION; INTRACAUDAL; PERINEURAL AS NEEDED
Status: DISCONTINUED | OUTPATIENT
Start: 2017-08-21 | End: 2017-08-21 | Stop reason: HOSPADM

## 2017-08-21 RX ORDER — DEXTROMETHORPHAN/PSEUDOEPHED 2.5-7.5/.8
1.2 DROPS ORAL
Status: DISCONTINUED | OUTPATIENT
Start: 2017-08-21 | End: 2017-08-21 | Stop reason: HOSPADM

## 2017-08-21 RX ORDER — FLUMAZENIL 0.1 MG/ML
0.2 INJECTION INTRAVENOUS
Status: DISCONTINUED | OUTPATIENT
Start: 2017-08-21 | End: 2017-08-21 | Stop reason: HOSPADM

## 2017-08-21 RX ORDER — EPINEPHRINE 0.1 MG/ML
1 INJECTION INTRACARDIAC; INTRAVENOUS
Status: DISCONTINUED | OUTPATIENT
Start: 2017-08-21 | End: 2017-08-21 | Stop reason: HOSPADM

## 2017-08-21 RX ORDER — SODIUM CHLORIDE 9 MG/ML
25 INJECTION, SOLUTION INTRAVENOUS CONTINUOUS
Status: DISCONTINUED | OUTPATIENT
Start: 2017-08-21 | End: 2017-08-21 | Stop reason: HOSPADM

## 2017-08-21 RX ORDER — SODIUM CHLORIDE 0.9 % (FLUSH) 0.9 %
5-10 SYRINGE (ML) INJECTION EVERY 8 HOURS
Status: DISCONTINUED | OUTPATIENT
Start: 2017-08-21 | End: 2017-08-21 | Stop reason: HOSPADM

## 2017-08-21 RX ADMIN — LIDOCAINE HYDROCHLORIDE 80 MG: 20 INJECTION, SOLUTION EPIDURAL; INFILTRATION; INTRACAUDAL; PERINEURAL at 11:42

## 2017-08-21 RX ADMIN — SODIUM CHLORIDE 25 ML/HR: 900 INJECTION, SOLUTION INTRAVENOUS at 11:27

## 2017-08-21 RX ADMIN — PROPOFOL 140 MG: 10 INJECTION, EMULSION INTRAVENOUS at 11:49

## 2017-08-21 NOTE — ROUTINE PROCESS
TRANSFER - IN REPORT:    Verbal report received from Guilherme RN(name) on Fabi Pat  being received from endo(unit) for ordered procedure      Report consisted of patients Situation, Background, Assessment and   Recommendations(SBAR). Information from the following report(s) SBAR was reviewed with the receiving nurse. Opportunity for questions and clarification was provided. Assessment completed upon patients arrival to unit and care assumed.

## 2017-08-21 NOTE — IP AVS SNAPSHOT
[address] [facility] 
[phone] Patient: Aleks Rose MRN: [id number] [id number]/[id number] You are allergic to the following No active allergies Recent Documentation Height Weight Breastfeeding? BMI OB Status Smoking Status 1.575 m 57.7 kg No 23.27 kg/m2 Hysterectomy Former Smoker Emergency Contacts Name Discharge Info Relation Home Work Mobile Johnny Fishman CAREGIVER [3] Spouse [3] [phone] About your hospitalization You were admitted on:  August 21, 2017 You last received care in the:  [facility] You were discharged on:  August 21, 2017 Unit phone number:  [phone] Why you were hospitalized Your primary diagnosis was:  Not on File Providers Seen During Your Hospitalizations Provider Role Specialty Primary office phone Uri Mejía MD Attending Provider Gastroenterology [phone] Your Primary Care Physician (PCP) Primary Care Physician Office Phone Office Fax [address] [place] [address] [phone] Follow-up Information Follow up With Details Comments Contact Info Harshad Torres MD   [address] [facility] 
[phone] Your Appointments Monday August 21, 2017 ESOPHAGOGASTRODUODENOSCOPY (EGD), ESOPHAGEAL DILATION with Uri Mejía MD  
[facility] (RI OR PRE ASSESSMENT) [address] [facility]  
[phone] Current Discharge Medication List  
  
CONTINUE these medications which have CHANGED Dose & Instructions Dispensing Information Comments Morning Noon Evening Bedtime * sucralfate 100 mg/mL suspension Commonly known as:  Kelley Rai What changed:  Another medication with the same name was added. Make sure you understand how and when to take each. Your last dose was: Your next dose is:    
   
   
 Dose:  1 g Take 10 mL by mouth four (4) times daily. Quantity:  1000 mL Refills:  3  
     
   
   
   
  
 * sucralfate 100 mg/mL suspension Commonly known as:  Curvin Hilt What changed:  Another medication with the same name was added. Make sure you understand how and when to take each. Your last dose was: Your next dose is:    
   
   
 Dose:  1 g Take 10 mL by mouth four (4) times daily. Quantity:  1000 mL Refills:  3  
     
   
   
   
  
 * sucralfate 100 mg/mL suspension Commonly known as:  Curvin Hilt What changed: You were already taking a medication with the same name, and this prescription was added. Make sure you understand how and when to take each. Your last dose was: Your next dose is:    
   
   
 Dose:  1 g Take 10 mL by mouth four (4) times daily for 10 days. Indications: esoph ulcers Quantity:  400 mL Refills:  3  
     
   
   
   
  
 * Notice: This list has 3 medication(s) that are the same as other medications prescribed for you. Read the directions carefully, and ask your doctor or other care provider to review them with you. CONTINUE these medications which have NOT CHANGED Dose & Instructions Dispensing Information Comments Morning Noon Evening Bedtime  
 aspirin 81 mg chewable tablet Your last dose was: Your next dose is:    
   
   
 Dose:  81 mg Take 81 mg by mouth daily. Refills:  0  
     
   
   
   
  
 atorvastatin 20 mg tablet Commonly known as:  LIPITOR Your last dose was: Your next dose is:    
   
   
 Dose:  20 mg Take 20 mg by mouth nightly. Refills:  0  
     
   
   
   
  
 HAIR,SKIN AND NAILS PO Your last dose was: Your next dose is: Take  by mouth two (2) times a day. Refills:  0  
     
   
   
   
  
 levothyroxine 75 mcg tablet Commonly known as:  SYNTHROID  
   
 Your last dose was: Your next dose is:    
   
   
 Dose:  75 mcg Take 75 mcg by mouth Daily (before breakfast). Refills:  0 NORVASC 10 mg tablet Generic drug:  amLODIPine Your last dose was: Your next dose is:    
   
   
 Dose:  10 mg Take 10 mg by mouth every morning. Refills:  0 PREMARIN 0.625 mg/gram vaginal cream  
Generic drug:  conjugated estrogens Your last dose was: Your next dose is:    
   
   
 Dose:  0.5 g Insert 0.5 g into vagina daily. Refills:  0  
     
   
   
   
  
 SYSTANE (PROPYLENE GLYCOL) 0.4-0.3 % Drop Generic drug:  peg 400-propylene glycol Your last dose was: Your next dose is:    
   
   
 Administer  to left eye as needed. Refills:  0  
     
   
   
   
  
 temazepam 15 mg capsule Commonly known as:  RESTORIL Your last dose was: Your next dose is: Take  by mouth nightly as needed for Sleep. Refills:  0  
     
   
   
   
  
 TYLENOL EXTRA STRENGTH 500 mg tablet Generic drug:  acetaminophen Your last dose was: Your next dose is: Take  by mouth as needed for Pain. Refills:  0 Where to Get Your Medications Information on where to get these meds will be given to you by the nurse or doctor. ! Ask your nurse or doctor about these medications  
  sucralfate 100 mg/mL suspension Discharge Instructions Lolly Leavitt [id number] EGD DISCHARGE INSTRUCTIONS Discomfort: 
Sore throat- throat lozenges or warm salt water gargle 
redness at IV site- apply warm compress to area; if redness or soreness persist- contact your physician Gaseous discomfort- walking, belching will help relieve any discomfort You may not operate a vehicle for 12 hours You may not engage in an occupation involving machinery or appliances for rest of today You may not drink alcoholic beverages for at least 12 hours Avoid making any critical decisions for at least 24 hour DIET You may have minimal sips at this time-- do not eat or drink for two hours. You may eat and drink after you wake up You may resume your regular diet  however -  remember your colon is empty and a heavy meal will produce gas. Avoid these foods:  vegetables, fried / greasy foods, carbonated drinks MEDICATIONS: 
See attached ACTIVITY You may resume your normal daily activities until tomorrow AM; 
Spend the remainder of the day resting -  avoid any strenuous activity. CALL M.D. ANY SIGN OF Increasing pain, nausea, vomiting Abdominal distension (swelling) New increased bleeding (oral or rectal) Fever (chills) Pain in chest area Bloody discharge from nose or mouth Shortness of breath You should not take any Advil, Aspirin, Ibuprofen, Motrin, Aleve, or Goodys for 10 days, ONLY  Tylenol as needed for pain. IMPRESSION: 
-- narrowing/stricturing, as suspected, and we dilated it to 42 french today, not quite as large as the last time we dilated, about 2 steps away. -- rest of the procedure was as expected Follow-up Instructions: 
 Call Dr. Fanny El for questions Telephone # [phone] Repeat upper endoscopy in 3-4 weeks Asif Alvarez MD 
 
[employer] Activation Thank you for requesting access to [employer]. Please follow the instructions below to securely access and download your online medical record. [employer] allows you to send messages to your doctor, view your test results, renew your prescriptions, schedule appointments, and more. How Do I Sign Up? 1. In your internet browser, go to [email or web address] 
2. Click on the First Time User? Click Here link in the Sign In box. You will be redirect to the New Member Sign Up page. 3. Enter your [employer] Access Code exactly as it appears below.  You will not need to use this code after youve completed the sign-up process. If you do not sign up before the expiration date, you must request a new code. [employer] Access Code: [email or web address] Expires: 2017 10:38 AM (This is the date your [employer] access code will ) 4. Enter the last four digits of your Social Security Number (xxxx) and Date of Birth (mm/dd/yyyy) as indicated and click Submit. You will be taken to the next sign-up page. 5. Create a [employer] ID. This will be your [employer] login ID and cannot be changed, so think of one that is secure and easy to remember. 6. Create a [employer] password. You can change your password at any time. 7. Enter your Password Reset Question and Answer. This can be used at a later time if you forget your password. 8. Enter your e-mail address. You will receive e-mail notification when new information is available in [address] 9. Click Sign Up. You can now view and download portions of your medical record. 10. Click the Download Summary menu link to download a portable copy of your medical information. Additional Information If you have questions, please visit the Frequently Asked Questions section of the [employer] website at [email or web address] Remember, [employer] is NOT to be used for urgent needs. For medical emergencies, dial 911. Discharge Orders None Introducing [facility]! Debo Marie introduces [employer] patient portal. Now you can access parts of your medical record, email your doctor's office, and request medication refills online. 1. In your internet browser, go to [email or web address] 2. Click on the First Time User? Click Here link in the Sign In box. You will see the New Member Sign Up page. 3. Enter your [employer] Access Code exactly as it appears below. You will not need to use this code after youve completed the sign-up process.  If you do not sign up before the expiration date, you must request a new code. · [employer] Access Code: [email or web address] Expires: 11/19/2017 10:38 AM 
 
4. Enter the last four digits of your Social Security Number (xxxx) and Date of Birth (mm/dd/yyyy) as indicated and click Submit. You will be taken to the next sign-up page. 5. Create a [employer] ID. This will be your [employer] login ID and cannot be changed, so think of one that is secure and easy to remember. 6. Create a [employer] password. You can change your password at any time. 7. Enter your Password Reset Question and Answer. This can be used at a later time if you forget your password. 8. Enter your e-mail address. You will receive e-mail notification when new information is available in [address] 9. Click Sign Up. You can now view and download portions of your medical record. 10. Click the Download Summary menu link to download a portable copy of your medical information. If you have questions, please visit the Frequently Asked Questions section of the [employer] website. Remember, [employer] is NOT to be used for urgent needs. For medical emergencies, dial 911. Now available from your iPhone and Android! General Information Please provide this summary of care documentation to your next provider. Patient Signature:  ____________________________________________________________ Date:  ____________________________________________________________  
  
Fayrene Retort Provider Signature:  ____________________________________________________________ Date:  ____________________________________________________________

## 2017-08-21 NOTE — ANESTHESIA PREPROCEDURE EVALUATION
Anesthetic History   No history of anesthetic complications            Review of Systems / Medical History  Patient summary reviewed, nursing notes reviewed and pertinent labs reviewed    Pulmonary                Comments: Former Smoker   Neuro/Psych   Within defined limits           Cardiovascular    Hypertension: well controlled          Hyperlipidemia    Exercise tolerance: >4 METS     GI/Hepatic/Renal               Comments: ESOPHAGEAL STRICTURE  DYSPHAGIA Endo/Other    Diabetes: well controlled, type 2  Hypothyroidism: well controlled  Cancer     Other Findings   Comments: Hx gum cancer  Gastric cancer         Physical Exam    Airway  Mallampati: III    Neck ROM: normal range of motion   Mouth opening: Diminished (comment)     Cardiovascular  Regular rate and rhythm,  S1 and S2 normal,  no murmur, click, rub, or gallop             Dental    Dentition: Full upper dentures and Implants     Pulmonary  Breath sounds clear to auscultation               Abdominal  GI exam deferred       Other Findings            Anesthetic Plan    ASA: 2  Anesthesia type: total IV anesthesia          Induction: Intravenous  Anesthetic plan and risks discussed with: Patient

## 2017-08-21 NOTE — H&P
Gastroenterology Outpatient History and Physical    Patient: [facility]    Physician: Lukasz Reid MD    Chief Complaint: Dysphagia  History of Present Illness: 73 yo F with dysphagia, hx of radiation stricturing. History:  Past Medical History:   Diagnosis Date    Cancer ([facility] [address]     Gum (SURGERY/RADIATION)    Diabetes ([facility] [address]     no meds required; controlled by diet and weight    Hypertension     Ill-defined condition     no venipuncture left arm--hx skin graft    Thyroid disease       Past Surgical History:   Procedure Laterality Date    ABDOMEN SURGERY PROC UNLISTED  7/5/2013    Feeding tube placement ( has been removed)    HX GI  10/2016    esoph w/ dilatation    HX GI  1980's    gastric ulcer excision (1/3 stomach removed)    HX GYN      Hysterectomy    HX HEENT  2013    gum surgery    HX HEENT Bilateral     TUBES IN EARS    HX HEENT      TRACHEOSTOMY    HX ORTHOPAEDIC      broken wrist    HX OTHER SURGICAL  2013    left arm veins/tissue used for oral/facial reconstruction    UPPER GI ENDOSCOPY,BALL DIL,30MM  10/26/2016         UPPER GI ENDOSCOPY,DILATN W GUIDE  2/27/2017         UPPER GI ENDOSCOPY,DILATN W GUIDE  3/27/2017         UPPER GI ENDOSCOPY,DILATN W GUIDE  4/12/2017         UPPER GI ENDOSCOPY,DILATN W GUIDE  5/1/2017           Social History     Social History    Marital status:      Spouse name: N/A    Number of children: N/A    Years of education: N/A     Social History Main Topics    Smoking status: Former Smoker     Quit date: 9/30/1980    Smokeless tobacco: Never Used    Alcohol use No    Drug use: No    Sexual activity: Not Asked     Other Topics Concern    None     Social History Narrative      Family History   Problem Relation Age of Onset    Cancer Father      colon    Kidney Disease Mother     Anesth Problems Neg Hx     There is no problem list on file for this patient.       Allergies: No Known Allergies  Medications:   Prior to Admission medications    Medication Sig Start Date End Date Taking? Authorizing Provider   amLODIPine (NORVASC) 10 mg tablet Take 10 mg by mouth every morning. Yes Historical Provider   atorvastatin (LIPITOR) 20 mg tablet Take 20 mg by mouth nightly. Yes Historical Provider   levothyroxine (SYNTHROID) 75 mcg tablet Take 75 mcg by mouth Daily (before breakfast). Yes Historical Provider   MULTIVITAMIN WITH MINERALS (HAIR,SKIN AND NAILS PO) Take  by mouth two (2) times a day. Yes Historical Provider   aspirin 81 mg chewable tablet Take 81 mg by mouth daily. Yes Historical Provider   conjugated estrogens (PREMARIN) 0.625 mg/gram vaginal cream Insert 0.5 g into vagina daily. Yes Historical Provider   temazepam (RESTORIL) 15 mg capsule Take  by mouth nightly as needed for Sleep. Yes Historical Provider   sucralfate (CARAFATE) 100 mg/mL suspension Take 10 mL by mouth four (4) times daily. 4/12/17   Chaim Walter MD   peg 400-propylene glycol (SYSTANE, PROPYLENE GLYCOL,) 0.4-0.3 % drop Administer  to left eye as needed. Historical Provider   acetaminophen (TYLENOL EXTRA STRENGTH) 500 mg tablet Take  by mouth as needed for Pain. Historical Provider   sucralfate (CARAFATE) 100 mg/mL suspension Take 10 mL by mouth four (4) times daily. 2/27/17   Chaim Walter MD     Physical Exam:   Vital Signs: Blood pressure 139/77, pulse 63, temperature 97.7 °F (36.5 °C), resp. rate 12, height 5' 2\" (1.575 m), weight 57.7 kg (127 lb 4 oz), SpO2 97 %, not currently breastfeeding.   General: well developed, well nourished   HEENT: unremarkable   Heart: regular rhythm no mumur    Lungs: clear   Abdominal:  benign   Neurological: unremarkable   Extremities: no edema     Findings/Diagnosis: dysphagia  Plan of Care/Planned Procedure: EGD with dilation with conscious/deep sedation    Signed:  Chaim Walter MD 8/21/2017

## 2017-08-21 NOTE — DISCHARGE INSTRUCTIONS
Gabriel Degree  [id number]  1945    EGD DISCHARGE INSTRUCTIONS  Discomfort:  Sore throat- throat lozenges or warm salt water gargle  redness at IV site- apply warm compress to area; if redness or soreness persist- contact your physician  Gaseous discomfort- walking, belching will help relieve any discomfort  You may not operate a vehicle for 12 hours  You may not engage in an occupation involving machinery or appliances for rest of today  You may not drink alcoholic beverages for at least 12 hours  Avoid making any critical decisions for at least 24 hour  DIET  You may have minimal sips at this time-- do not eat or drink for two hours. You may eat and drink after you wake up  You may resume your regular diet - however -  remember your colon is empty and a heavy meal will produce gas. Avoid these foods:  vegetables, fried / greasy foods, carbonated drinks    MEDICATIONS:  See attached     ACTIVITY  You may resume your normal daily activities until tomorrow AM;  Spend the remainder of the day resting -  avoid any strenuous activity. CALL M.D. ANY SIGN OF   Increasing pain, nausea, vomiting  Abdominal distension (swelling)  New increased bleeding (oral or rectal)  Fever (chills)  Pain in chest area  Bloody discharge from nose or mouth  Shortness of breath    You should not take any Advil, Aspirin, Ibuprofen, Motrin, Aleve, or Goodys for 10 days, ONLY  Tylenol as needed for pain. IMPRESSION:  -- narrowing/stricturing, as suspected, and we dilated it to 42 french today, not quite as large as the last time we dilated, about 2 steps away. -- rest of the procedure was as expected    Follow-up Instructions:   Call Dr. Nay Saunders for questions   Telephone # [phone]  Repeat upper endoscopy in 3-4 weeks    Ulysses Jude, MD    [employer] Activation    Thank you for requesting access to [address] Please follow the instructions below to securely access and download your online medical record.  [employer] allows you to send messages to your doctor, view your test results, renew your prescriptions, schedule appointments, and more. How Do I Sign Up? 1. In your internet browser, go to [email or web address]  2. Click on the First Time User? Click Here link in the Sign In box. You will be redirect to the New Member Sign Up page. 3. Enter your [employer] Access Code exactly as it appears below. You will not need to use this code after youve completed the sign-up process. If you do not sign up before the expiration date, you must request a new code. [employer] Access Code: [email or web address]  Expires: 2017 10:38 AM (This is the date your [employer] access code will )    4. Enter the last four digits of your Social Security Number (xxxx) and Date of Birth (mm/dd/yyyy) as indicated and click Submit. You will be taken to the next sign-up page. 5. Create a [employer] ID. This will be your [employer] login ID and cannot be changed, so think of one that is secure and easy to remember. 6. Create a [employer] password. You can change your password at any time. 7. Enter your Password Reset Question and Answer. This can be used at a later time if you forget your password. 8. Enter your e-mail address. You will receive e-mail notification when new information is available in [address] 9. Click Sign Up. You can now view and download portions of your medical record. 10. Click the Download Summary menu link to download a portable copy of your medical information. Additional Information    If you have questions, please visit the Frequently Asked Questions section of the [employer] website at [email or web address] Remember, [employer] is NOT to be used for urgent needs. For medical emergencies, dial 911.

## 2017-08-21 NOTE — ANESTHESIA POSTPROCEDURE EVALUATION
Post-Anesthesia Evaluation and Assessment    Patient: Trev Carlos MRN: [id number]  SSN: [id number]    YOB: 1945  Age: 74 y.o. Sex: female       Cardiovascular Function/Vital Signs  Visit Vitals    /54    Pulse 63    Temp 36.5 °C (97.7 °F)    Resp 14    Ht 5' 2\" (1.575 m)    Wt 57.7 kg (127 lb 4 oz)    SpO2 97%    Breastfeeding No    BMI 23.27 kg/m2       Patient is status post total IV anesthesia anesthesia for Procedure(s):  ESOPHAGOGASTRODUODENOSCOPY (EGD) WITH GUIDEWIRE DILATION  ESOPHAGEAL DILATION. Nausea/Vomiting: None    Postoperative hydration reviewed and adequate. Pain:  Pain Scale 1: Visual (08/21/17 1207)  Pain Intensity 1: 0 (08/21/17 1207)   Managed    Neurological Status: At baseline    Mental Status and Level of Consciousness: Arousable    Pulmonary Status:   O2 Device: Room air (08/21/17 1207)   Adequate oxygenation and airway patent    Complications related to anesthesia: None    Post-anesthesia assessment completed.  No concerns    Signed By: Lashay Jensen MD     August 21, 2017

## 2017-08-21 NOTE — PROGRESS NOTES
Anesthesia reports 140 mg Propofol, 80 mg Lidocaine and 400 ml of Normal Saline were given during procedure. Received report from anesthesia staff on vital signs and status of patient.

## 2017-08-21 NOTE — PROCEDURES
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  Pre-operative Diagnosis: see indication above  Post-operative Diagnosis: see findings below  :  Bob Logan MD  Referring Provider:   -Jonathan Reina MD    Exam:  Airway: clear, no airway problems anticipated  Heart: RRR, without gallops or rubs  Lungs: clear bilaterally without wheezes, crackles, or rhonchi  Abdomen: soft, nontender, nondistended, bowel sounds present  Mental Status: awake, alert and oriented to person, place and time     Anethesia/Sedation:  MAC anesthesia Propofol  Procedure Details   After informed consent was obtained for the procedure, with all risks and benefits of procedure explained the patient was taken to the endoscopy suite and placed in the left lateral decubitus position. Following sequential administration of sedation as per above, the [id number] gastroscope was inserted into the mouth and advanced under direct vision to second portion of the duodenum. A careful inspection was made as the gastroscope was withdrawn, including a retroflexed view of the proximal stomach; findings and interventions are described below. Findings:   OROPHARYNX: Cords and pyriform recesses with some edema  ESOPHAGUS:   -- mid to distal esophagus with multiple concentric narrowed rings, and just barely allowed passage of the upper endoscope with mild resistance. -- dilated to 36 fr, 39 fr, 42 fr with noted shear effect using the bougie dilators over the wire. Re-look endoscopy performed. STOMACH: The fundus on antegrade and retroflex views reveals a hiatal hernia. The body is normal. Antrum and pylorus are surgically absent.   SMALL INTESTINE: normal mucosa    Therapies:  esophageal dilation with 36 fr, 39 fr, 42 fr bougie dilators    Specimens: none    EBL:  None. Complications:   None; patient tolerated the procedure well. Impression:    -- narrowed, concentric stricturing in mid and distal esophagus, dilated to max of 42 fr with bougie dilators over the wire, with moderate shear effect on re-look endoscopy  -- hiatal hernia  -- history of radiation, the likely source of esophageal stricturing  -- surgical changes from previous antrectomy    Recommendations:  -- Continue acid suppression.   -- repeat EGD in 2-4 weeks for repeat dilation or as needed  -- Carafate QID x 10 days    Discharge disposition:  Home in the company of  when able to ambulate    Andrea Cosby MD

## 2017-10-02 ENCOUNTER — ANESTHESIA EVENT (OUTPATIENT)
Dept: ENDOSCOPY | Age: 72
End: 2017-10-02
Payer: MEDICARE

## 2017-10-02 ENCOUNTER — HOSPITAL ENCOUNTER (OUTPATIENT)
Age: 72
Setting detail: OUTPATIENT SURGERY
Discharge: HOME | End: 2017-10-02
Attending: INTERNAL MEDICINE | Admitting: INTERNAL MEDICINE
Payer: MEDICARE

## 2017-10-02 ENCOUNTER — ANESTHESIA (OUTPATIENT)
Dept: ENDOSCOPY | Age: 72
End: 2017-10-02
Payer: MEDICARE

## 2017-10-02 VITALS
TEMPERATURE: 97.5 F | HEIGHT: 63 IN | WEIGHT: 133 LBS | RESPIRATION RATE: 14 BRPM | OXYGEN SATURATION: 99 % | SYSTOLIC BLOOD PRESSURE: 135 MMHG | HEART RATE: 74 BPM | BODY MASS INDEX: 23.57 KG/M2 | DIASTOLIC BLOOD PRESSURE: 66 MMHG

## 2017-10-02 PROCEDURE — 76040000019: Performed by: INTERNAL MEDICINE

## 2017-10-02 PROCEDURE — 74011250636 HC RX REV CODE- 250/636

## 2017-10-02 PROCEDURE — 74011000250 HC RX REV CODE- 250

## 2017-10-02 PROCEDURE — 76060000031 HC ANESTHESIA FIRST 0.5 HR: Performed by: INTERNAL MEDICINE

## 2017-10-02 PROCEDURE — 74011250636 HC RX REV CODE- 250/636: Performed by: INTERNAL MEDICINE

## 2017-10-02 RX ORDER — FLUMAZENIL 0.1 MG/ML
0.2 INJECTION INTRAVENOUS
Status: ACTIVE | OUTPATIENT
Start: 2017-10-02 | End: 2017-10-02

## 2017-10-02 RX ORDER — NALOXONE HYDROCHLORIDE 0.4 MG/ML
0.4 INJECTION, SOLUTION INTRAMUSCULAR; INTRAVENOUS; SUBCUTANEOUS
Status: ACTIVE | OUTPATIENT
Start: 2017-10-02 | End: 2017-10-02

## 2017-10-02 RX ORDER — EPINEPHRINE 0.1 MG/ML
1 INJECTION INTRACARDIAC; INTRAVENOUS
Status: ACTIVE | OUTPATIENT
Start: 2017-10-02 | End: 2017-10-02

## 2017-10-02 RX ORDER — SODIUM CHLORIDE 0.9 % (FLUSH) 0.9 %
5-10 SYRINGE (ML) INJECTION AS NEEDED
Status: ACTIVE | OUTPATIENT
Start: 2017-10-02 | End: 2017-10-02

## 2017-10-02 RX ORDER — PROPOFOL 10 MG/ML
INJECTION, EMULSION INTRAVENOUS AS NEEDED
Status: DISCONTINUED | OUTPATIENT
Start: 2017-10-02 | End: 2017-10-02 | Stop reason: HOSPADM

## 2017-10-02 RX ORDER — LIDOCAINE HYDROCHLORIDE 20 MG/ML
INJECTION, SOLUTION EPIDURAL; INFILTRATION; INTRACAUDAL; PERINEURAL AS NEEDED
Status: DISCONTINUED | OUTPATIENT
Start: 2017-10-02 | End: 2017-10-02 | Stop reason: HOSPADM

## 2017-10-02 RX ORDER — SODIUM CHLORIDE 0.9 % (FLUSH) 0.9 %
5-10 SYRINGE (ML) INJECTION EVERY 8 HOURS
Status: DISCONTINUED | OUTPATIENT
Start: 2017-10-02 | End: 2017-10-02 | Stop reason: HOSPADM

## 2017-10-02 RX ORDER — SODIUM CHLORIDE 9 MG/ML
25 INJECTION, SOLUTION INTRAVENOUS CONTINUOUS
Status: DISPENSED | OUTPATIENT
Start: 2017-10-02 | End: 2017-10-02

## 2017-10-02 RX ORDER — GLYCOPYRROLATE 0.2 MG/ML
INJECTION INTRAMUSCULAR; INTRAVENOUS AS NEEDED
Status: DISCONTINUED | OUTPATIENT
Start: 2017-10-02 | End: 2017-10-02 | Stop reason: HOSPADM

## 2017-10-02 RX ORDER — ATROPINE SULFATE 0.1 MG/ML
0.5 INJECTION INTRAVENOUS
Status: ACTIVE | OUTPATIENT
Start: 2017-10-02 | End: 2017-10-02

## 2017-10-02 RX ORDER — DEXTROMETHORPHAN/PSEUDOEPHED 2.5-7.5/.8
1.2 DROPS ORAL
Status: DISCONTINUED | OUTPATIENT
Start: 2017-10-02 | End: 2017-10-02 | Stop reason: HOSPADM

## 2017-10-02 RX ADMIN — SODIUM CHLORIDE 25 ML/HR: 900 INJECTION, SOLUTION INTRAVENOUS at 10:26

## 2017-10-02 RX ADMIN — PROPOFOL 150 MG: 10 INJECTION, EMULSION INTRAVENOUS at 11:00

## 2017-10-02 RX ADMIN — GLYCOPYRROLATE 0.2 MG: 0.2 INJECTION INTRAMUSCULAR; INTRAVENOUS at 10:53

## 2017-10-02 RX ADMIN — LIDOCAINE HYDROCHLORIDE 60 MG: 20 INJECTION, SOLUTION EPIDURAL; INFILTRATION; INTRACAUDAL; PERINEURAL at 10:53

## 2017-10-02 NOTE — DISCHARGE INSTRUCTIONS
Marshall Baez  [id number]  1945    EGD DISCHARGE INSTRUCTIONS  Discomfort:  Sore throat- throat lozenges or warm salt water gargle  redness at IV site- apply warm compress to area; if redness or soreness persist- contact your physician  Gaseous discomfort- walking, belching will help relieve any discomfort  You may not operate a vehicle for 12 hours  You may not engage in an occupation involving machinery or appliances for rest of today  You may not drink alcoholic beverages for at least 12 hours  Avoid making any critical decisions for at least 24 hour  DIET  You may have minimal sips at this time-- do not eat or drink for two hours. You may eat and drink after you wake up  You may resume your regular diet - however -  remember your colon is empty and a heavy meal will produce gas. Avoid these foods:  vegetables, fried / greasy foods, carbonated drinks    MEDICATIONS:  See attached    ACTIVITY  You may resume your normal daily activities until tomorrow AM;  Spend the remainder of the day resting -  avoid any strenuous activity. CALL M.D. ANY SIGN OF   Increasing pain, nausea, vomiting  Abdominal distension (swelling)  New increased bleeding (oral or rectal)  Fever (chills)  Pain in chest area  Bloody discharge from nose or mouth  Shortness of breath    IMPRESSION:  -- successful dilation ! !!   -- no new findings otherwise    Follow-up Instructions:   Call Dr. Gurdeep Ozuna for the results of procedure / biopsy in 7-10 days   Telephone # [phone]  Repeat upper endoscopy in 4-6 weeks    Hillary Graff MD

## 2017-10-02 NOTE — ANESTHESIA PREPROCEDURE EVALUATION
Anesthetic History   No history of anesthetic complications            Review of Systems / Medical History  Patient summary reviewed, nursing notes reviewed and pertinent labs reviewed    Pulmonary                Comments: Former Smoker   Neuro/Psych   Within defined limits           Cardiovascular    Hypertension: well controlled          Hyperlipidemia    Exercise tolerance: >4 METS     GI/Hepatic/Renal     GERD          Comments: ESOPHAGEAL STRICTURE  DYSPHAGIA Endo/Other    Diabetes: well controlled, type 2  Hypothyroidism: well controlled  Cancer     Other Findings   Comments: Hx gum cancer  Gastric cancer         Physical Exam    Airway  Mallampati: III    Neck ROM: normal range of motion   Mouth opening: Diminished (comment)     Cardiovascular  Regular rate and rhythm,  S1 and S2 normal,  no murmur, click, rub, or gallop             Dental    Dentition: Full upper dentures and Implants     Pulmonary  Breath sounds clear to auscultation               Abdominal  GI exam deferred       Other Findings            Anesthetic Plan    ASA: 2  Anesthesia type: total IV anesthesia          Induction: Intravenous  Anesthetic plan and risks discussed with: Patient

## 2017-10-02 NOTE — PROCEDURES
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  Pre-operative Diagnosis: see indication above  Post-operative Diagnosis: see findings below  :  Kenisha Ramos MD  Referring Provider:   -Vanesa Guajardo MD    Exam:  Airway: clear, no airway problems anticipated  Heart: RRR, without gallops or rubs  Lungs: clear bilaterally without wheezes, crackles, or rhonchi  Abdomen: soft, nontender, nondistended, bowel sounds present  Mental Status: awake, alert and oriented to person, place and time     Anethesia/Sedation:  MAC anesthesia Propofol  Procedure Details   After informed consent was obtained for the procedure, with all risks and benefits of procedure explained the patient was taken to the endoscopy suite and placed in the left lateral decubitus position. Following sequential administration of sedation as per above, the QPGF341 gastroscope was inserted into the mouth and advanced under direct vision to second portion of the duodenum. A careful inspection was made as the gastroscope was withdrawn, including a retroflexed view of the proximal stomach; findings and interventions are described below. Findings:   OROPHARYNX: Cords and pyriform recesses with some edema  ESOPHAGUS:   -- mid to distal esophagus with multiple concentric narrowed rings, and just barely allowed passage of the upper endoscope with mild resistance. -- dilated to 42 fr, 45 fr, 51 fr with noted shear effect using the bougie dilators over the wire. Re-look endoscopy not performed. STOMACH: The fundus on antegrade and retroflex views reveals a hiatal hernia. The body is normal. Antrum and pylorus are surgically absent.   SMALL INTESTINE: normal mucosa    Therapies:  esophageal dilation with 42 fr, 45 fr, 51 fr bougie dilators    Specimens: none    EBL:  None. Complications:   None; patient tolerated the procedure well. Impression:    -- narrowed, concentric stricturing in mid and distal esophagus, dilated to max of 51 fr with bougie dilators over the wire  -- hiatal hernia  -- history of radiation, the likely source of esophageal stricturing  -- surgical changes from previous antrectomy    Recommendations:  -- Continue acid suppression.   -- repeat EGD in 2-4 weeks for repeat dilation or as needed  -- Carafate QID x 10 days    Discharge disposition:  Home in the company of  when able to ambulate    Hamilton Carlisle MD

## 2017-10-02 NOTE — IP AVS SNAPSHOT
[address] [facility] 
[phone] Patient: Mammie Romberg MRN: [id number] GOM:[birth year] You are allergic to the following No active allergies Recent Documentation Height Weight Breastfeeding? BMI OB Status Smoking Status 1.6 m 60.3 kg No 23.56 kg/m2 Hysterectomy Former Smoker Emergency Contacts Name Discharge Info Relation Home Work Mobile Anai Ying CAREGIVER [3] Spouse [3] [phone] About your hospitalization You were admitted on:  October 2, 2017 You last received care in the:  [facility] ENDOSCOPY You were discharged on:  October 2, 2017 Unit phone number:  [phone] Why you were hospitalized Your primary diagnosis was:  Not on File Providers Seen During Your Hospitalizations Provider Role Specialty Primary office phone Troy Kay MD Attending Provider Gastroenterology [phone] Your Primary Care Physician (PCP) Primary Care Physician Office Phone Office Fax [address] [place] [address] [phone] Follow-up Information Follow up With Details Comments Contact Info Chuy Emery MD   [address] [facility] 
[phone] Current Discharge Medication List  
  
CONTINUE these medications which have NOT CHANGED Dose & Instructions Dispensing Information Comments Morning Noon Evening Bedtime  
 aspirin 81 mg chewable tablet Your last dose was: Your next dose is:    
   
   
 Dose:  81 mg Take 81 mg by mouth daily. Refills:  0  
     
   
   
   
  
 atorvastatin 20 mg tablet Commonly known as:  LIPITOR Your last dose was: Your next dose is:    
   
   
 Dose:  20 mg Take 20 mg by mouth nightly. Refills:  0  
     
   
   
   
  
 HAIR,SKIN AND NAILS PO Your last dose was: Your next dose is: Take  by mouth two (2) times a day. Refills:  0  
     
   
   
   
  
 levothyroxine 75 mcg tablet Commonly known as:  SYNTHROID Your last dose was: Your next dose is:    
   
   
 Dose:  75 mcg Take 75 mcg by mouth Daily (before breakfast). Refills:  0 NORVASC 10 mg tablet Generic drug:  amLODIPine Your last dose was: Your next dose is:    
   
   
 Dose:  10 mg Take 10 mg by mouth every morning. Refills:  0 PREMARIN 0.625 mg/gram vaginal cream  
Generic drug:  conjugated estrogens Your last dose was: Your next dose is:    
   
   
 Dose:  0.5 g Insert 0.5 g into vagina two (2) times a week. Refills:  0  
     
   
   
   
  
 sucralfate 100 mg/mL suspension Commonly known as:  Ronita Hy Your last dose was: Your next dose is:    
   
   
 Dose:  1 g Take 10 mL by mouth four (4) times daily. Quantity:  1000 mL Refills:  3 SYSTANE (PROPYLENE GLYCOL) 0.4-0.3 % Drop Generic drug:  peg 400-propylene glycol Your last dose was: Your next dose is:    
   
   
 Dose:  1 Drop Apply 1 Drop to eye as needed. Both eyes Refills:  0  
     
   
   
   
  
 temazepam 15 mg capsule Commonly known as:  RESTORIL Your last dose was: Your next dose is: Take  by mouth nightly as needed for Sleep. Refills:  0  
     
   
   
   
  
 TYLENOL EXTRA STRENGTH 500 mg tablet Generic drug:  acetaminophen Your last dose was: Your next dose is: Take  by mouth as needed for Pain. Refills:  0 Discharge Instructions Vik Mclaughlin [id number] EGD DISCHARGE INSTRUCTIONS Discomfort: 
Sore throat- throat lozenges or warm salt water gargle 
redness at IV site- apply warm compress to area; if redness or soreness persist- contact your physician Gaseous discomfort- walking, belching will help relieve any discomfort You may not operate a vehicle for 12 hours You may not engage in an occupation involving machinery or appliances for rest of today You may not drink alcoholic beverages for at least 12 hours Avoid making any critical decisions for at least 24 hour DIET You may have minimal sips at this time-- do not eat or drink for two hours. You may eat and drink after you wake up You may resume your regular diet  however -  remember your colon is empty and a heavy meal will produce gas. Avoid these foods:  vegetables, fried / greasy foods, carbonated drinks MEDICATIONS: 
See attached ACTIVITY You may resume your normal daily activities until tomorrow AM; 
Spend the remainder of the day resting -  avoid any strenuous activity. CALL M.D. ANY SIGN OF Increasing pain, nausea, vomiting Abdominal distension (swelling) New increased bleeding (oral or rectal) Fever (chills) Pain in chest area Bloody discharge from nose or mouth Shortness of breath IMPRESSION: 
-- successful dilation ! !!  
-- no new findings otherwise Follow-up Instructions: 
 Call Dr. Martha López for the results of procedure / biopsy in 7-10 days Telephone # [phone] Repeat upper endoscopy in 4-6 weeks Elaine Horner MD 
 
  
 
Discharge Orders None Introducing [facility]! Ariel Gamble introduces [employer] patient portal. Now you can access parts of your medical record, email your doctor's office, and request medication refills online. 1. In your internet browser, go to [email or web address] 2. Click on the First Time User? Click Here link in the Sign In box. You will see the New Member Sign Up page. 3. Enter your [employer] Access Code exactly as it appears below. You will not need to use this code after youve completed the sign-up process.  If you do not sign up before the expiration date, you must request a new code. · [employer] Access Code: [email or web address] Expires: 11/19/2017 10:38 AM 
 
4. Enter the last four digits of your Social Security Number (xxxx) and Date of Birth (mm/dd/yyyy) as indicated and click Submit. You will be taken to the next sign-up page. 5. Create a [employer] ID. This will be your [employer] login ID and cannot be changed, so think of one that is secure and easy to remember. 6. Create a [employer] password. You can change your password at any time. 7. Enter your Password Reset Question and Answer. This can be used at a later time if you forget your password. 8. Enter your e-mail address. You will receive e-mail notification when new information is available in [address] 9. Click Sign Up. You can now view and download portions of your medical record. 10. Click the Download Summary menu link to download a portable copy of your medical information. If you have questions, please visit the Frequently Asked Questions section of the [employer] website. Remember, [employer] is NOT to be used for urgent needs. For medical emergencies, dial 911. Now available from your iPhone and Android! General Information Please provide this summary of care documentation to your next provider. Patient Signature:  ____________________________________________________________ Date:  ____________________________________________________________  
  
Rosibel Sleight Provider Signature:  ____________________________________________________________ Date:  ____________________________________________________________

## 2017-10-02 NOTE — ANESTHESIA POSTPROCEDURE EVALUATION
Post-Anesthesia Evaluation and Assessment    Patient: Marshall Baez MRN: [id number]  SSN: [id number]    YOB: 1945  Age: 74 y.o. Sex: female       Cardiovascular Function/Vital Signs  Visit Vitals    /70    Pulse 92    Temp 36.5 °C (97.7 °F)    Resp 16    Ht 5' 3\" (1.6 m)    Wt 60.3 kg (133 lb)    SpO2 100%    Breastfeeding No    BMI 23.56 kg/m2       Patient is status post total IV anesthesia anesthesia for Procedure(s):  EGD WITH GUIDEWIRE DILATION  ESOPHAGEAL DILATION. Nausea/Vomiting: None    Postoperative hydration reviewed and adequate. Pain:  Pain Scale 1: Visual (10/02/17 1105)  Pain Intensity 1: 0 (10/02/17 1105)   Managed    Neurological Status: At baseline    Mental Status and Level of Consciousness: Arousable    Pulmonary Status:   O2 Device: CO2 nasal cannula (10/02/17 1105)   Adequate oxygenation and airway patent    Complications related to anesthesia: None    Post-anesthesia assessment completed.  No concerns    Signed By: Soo Rhodes MD     October 2, 2017

## 2017-10-02 NOTE — H&P
Gastroenterology Outpatient History and Physical    Patient: Farzad Vieyra    Physician: Adarsh Farr MD    Chief Complaint: Dysphagia  History of Present Illness: 75 yo F with dysphagia, hx of radiation stricturing.     History:  Past Medical History:   Diagnosis Date    Cancer ([facility] [address]     Gum (SURGERY/RADIATION)    Diabetes ([facility] [address]     no meds required; controlled by diet and weight    GERD (gastroesophageal reflux disease)     Hypertension     Ill-defined condition     no venipuncture left arm--hx skin graft    Thyroid disease       Past Surgical History:   Procedure Laterality Date    ABDOMEN SURGERY PROC UNLISTED  7/5/2013    Feeding tube placement ( has been removed)    HX GI  10/2016    esoph w/ dilatation    HX GI  1980's    gastric ulcer excision (1/3 stomach removed)    HX HEENT  2013    gum surgery    HX HEENT Bilateral     TUBES IN EARS    HX HEENT      TRACHEOSTOMY    HX ORTHOPAEDIC      broken wrist    HX OTHER SURGICAL  2013    left arm veins/tissue used for oral/facial reconstruction    HX PELON AND BSO      [place] EGD INSERT GUIDE WIRE DILATOR PASSAGE ESOPHAGUS  8/21/2017         UPPER GI ENDOSCOPY,BALL DIL,30MM  10/26/2016         UPPER GI ENDOSCOPY,DILATN W GUIDE  2/27/2017         UPPER GI ENDOSCOPY,DILATN W GUIDE  3/27/2017         UPPER GI ENDOSCOPY,DILATN W GUIDE  4/12/2017         UPPER GI ENDOSCOPY,DILATN W GUIDE  5/1/2017           Social History     Social History    Marital status:      Spouse name: N/A    Number of children: N/A    Years of education: N/A     Social History Main Topics    Smoking status: Former Smoker     Quit date: 9/30/1980    Smokeless tobacco: Never Used    Alcohol use No    Drug use: No    Sexual activity: Not Asked     Other Topics Concern    None     Social History Narrative      Family History   Problem Relation Age of Onset    Cancer Father      colon    Kidney Disease Mother     Anesth Problems Neg Hx     There is no problem list on file for this patient. Allergies: No Known Allergies  Medications:   Prior to Admission medications    Medication Sig Start Date End Date Taking? Authorizing Provider   sucralfate (CARAFATE) 100 mg/mL suspension Take 10 mL by mouth four (4) times daily. 4/12/17  Yes Michael Buck MD   peg 400-propylene glycol (SYSTANE, PROPYLENE GLYCOL,) 0.4-0.3 % drop Apply 1 Drop to eye as needed. Both eyes   Yes Historical Provider   amLODIPine (NORVASC) 10 mg tablet Take 10 mg by mouth every morning. Yes Historical Provider   atorvastatin (LIPITOR) 20 mg tablet Take 20 mg by mouth nightly. Yes Historical Provider   levothyroxine (SYNTHROID) 75 mcg tablet Take 75 mcg by mouth Daily (before breakfast). Yes Historical Provider   acetaminophen (TYLENOL EXTRA STRENGTH) 500 mg tablet Take  by mouth as needed for Pain. Yes Historical Provider   MULTIVITAMIN WITH MINERALS (HAIR,SKIN AND NAILS PO) Take  by mouth two (2) times a day. Yes Historical Provider   aspirin 81 mg chewable tablet Take 81 mg by mouth daily. Yes Historical Provider   conjugated estrogens (PREMARIN) 0.625 mg/gram vaginal cream Insert 0.5 g into vagina two (2) times a week. Yes Historical Provider   temazepam (RESTORIL) 15 mg capsule Take  by mouth nightly as needed for Sleep. Yes Historical Provider     Physical Exam:   Vital Signs: Blood pressure 133/54, pulse 71, temperature 97.7 °F (36.5 °C), resp. rate 18, height 5' 3\" (1.6 m), weight 60.3 kg (133 lb), SpO2 99 %, not currently breastfeeding.   General: well developed, well nourished   HEENT: unremarkable   Heart: regular rhythm no mumur    Lungs: clear   Abdominal:  benign   Neurological: unremarkable   Extremities: no edema     Findings/Diagnosis: dysphagia  Plan of Care/Planned Procedure: EGD with dilation with conscious/deep sedation    Signed:  Michael Buck MD 10/2/2017

## 2017-10-02 NOTE — PERIOP NOTES
Anesthesia reports 150mg Propofol, 60mg Lidocaine and 250mL NS, 0.2 mg robinul given during procedure. Received report from anesthesia staff on vital signs and status of patient.

## 2017-10-02 NOTE — PROGRESS NOTES
Cely Campbell  1945  [id number]    Situation:  Verbal report received from: Kitty Sharp  Procedure: Procedure(s):  EGD WITH GUIDEWIRE DILATION  ESOPHAGEAL DILATION    Background:    Preoperative diagnosis: RADIATION-INDUCED ESOPHAGEAL STRICTURE  Postoperative diagnosis: radiation stricture    :  Dr. Per Valadez  Assistant(s): Endoscopy Technician-1: Elliot Sousa  Endoscopy RN-1: Justine Ambrose RN    Specimens: * No specimens in log *  H. Pylori  no    Assessment:  Intra-procedure medications     Anesthesia gave intra-procedure sedation and medications, see anesthesia flow sheet yes    Intravenous fluids: NS@ KVO     Vital signs stable     Abdominal assessment: round and soft     Recommendation:  Discharge patient per MD order.   Family or Friend   Permission to share finding with family or friend yes

## 2017-10-27 ENCOUNTER — HOSPITAL ENCOUNTER (OUTPATIENT)
Dept: GENERAL RADIOLOGY | Age: 72
Discharge: HOME | End: 2017-10-27
Payer: MEDICARE

## 2017-10-27 DIAGNOSIS — Z78.9 NON-SMOKER: ICD-10-CM

## 2017-10-27 DIAGNOSIS — C04.9 MALIGNANT NEOPLASM OF FLOOR OF MOUTH (HCC): ICD-10-CM

## 2017-10-27 PROCEDURE — 71020 XR CHEST PA LAT: CPT

## 2017-11-10 ENCOUNTER — ANESTHESIA EVENT (OUTPATIENT)
Dept: ENDOSCOPY | Age: 72
End: 2017-11-10
Payer: MEDICARE

## 2017-11-10 ENCOUNTER — HOSPITAL ENCOUNTER (OUTPATIENT)
Age: 72
Setting detail: OUTPATIENT SURGERY
Discharge: HOME | End: 2017-11-10
Attending: INTERNAL MEDICINE | Admitting: INTERNAL MEDICINE
Payer: MEDICARE

## 2017-11-10 ENCOUNTER — ANESTHESIA (OUTPATIENT)
Dept: ENDOSCOPY | Age: 72
End: 2017-11-10
Payer: MEDICARE

## 2017-11-10 VITALS
BODY MASS INDEX: 24.01 KG/M2 | TEMPERATURE: 97.2 F | WEIGHT: 130.44 LBS | RESPIRATION RATE: 17 BRPM | DIASTOLIC BLOOD PRESSURE: 43 MMHG | SYSTOLIC BLOOD PRESSURE: 137 MMHG | OXYGEN SATURATION: 97 % | HEIGHT: 62 IN | HEART RATE: 64 BPM

## 2017-11-10 PROCEDURE — 74011250636 HC RX REV CODE- 250/636: Performed by: INTERNAL MEDICINE

## 2017-11-10 PROCEDURE — 76040000019: Performed by: INTERNAL MEDICINE

## 2017-11-10 PROCEDURE — 74011250636 HC RX REV CODE- 250/636

## 2017-11-10 PROCEDURE — 77030018712 HC DEV BLLN INFL BSC -B: Performed by: INTERNAL MEDICINE

## 2017-11-10 PROCEDURE — 76060000031 HC ANESTHESIA FIRST 0.5 HR: Performed by: INTERNAL MEDICINE

## 2017-11-10 PROCEDURE — 74011000250 HC RX REV CODE- 250

## 2017-11-10 RX ORDER — SODIUM CHLORIDE 0.9 % (FLUSH) 0.9 %
5-10 SYRINGE (ML) INJECTION AS NEEDED
Status: DISCONTINUED | OUTPATIENT
Start: 2017-11-10 | End: 2017-11-10 | Stop reason: HOSPADM

## 2017-11-10 RX ORDER — NALOXONE HYDROCHLORIDE 0.4 MG/ML
0.4 INJECTION, SOLUTION INTRAMUSCULAR; INTRAVENOUS; SUBCUTANEOUS
Status: DISCONTINUED | OUTPATIENT
Start: 2017-11-10 | End: 2017-11-10 | Stop reason: HOSPADM

## 2017-11-10 RX ORDER — DEXTROMETHORPHAN/PSEUDOEPHED 2.5-7.5/.8
1.2 DROPS ORAL
Status: DISCONTINUED | OUTPATIENT
Start: 2017-11-10 | End: 2017-11-10 | Stop reason: HOSPADM

## 2017-11-10 RX ORDER — SODIUM CHLORIDE 0.9 % (FLUSH) 0.9 %
5-10 SYRINGE (ML) INJECTION EVERY 8 HOURS
Status: DISCONTINUED | OUTPATIENT
Start: 2017-11-10 | End: 2017-11-10 | Stop reason: HOSPADM

## 2017-11-10 RX ORDER — PROPOFOL 10 MG/ML
INJECTION, EMULSION INTRAVENOUS AS NEEDED
Status: DISCONTINUED | OUTPATIENT
Start: 2017-11-10 | End: 2017-11-10 | Stop reason: HOSPADM

## 2017-11-10 RX ORDER — EPINEPHRINE 0.1 MG/ML
1 INJECTION INTRACARDIAC; INTRAVENOUS
Status: DISCONTINUED | OUTPATIENT
Start: 2017-11-10 | End: 2017-11-10 | Stop reason: HOSPADM

## 2017-11-10 RX ORDER — LIDOCAINE HYDROCHLORIDE 20 MG/ML
INJECTION, SOLUTION EPIDURAL; INFILTRATION; INTRACAUDAL; PERINEURAL AS NEEDED
Status: DISCONTINUED | OUTPATIENT
Start: 2017-11-10 | End: 2017-11-10 | Stop reason: HOSPADM

## 2017-11-10 RX ORDER — SODIUM CHLORIDE 9 MG/ML
25 INJECTION, SOLUTION INTRAVENOUS CONTINUOUS
Status: DISCONTINUED | OUTPATIENT
Start: 2017-11-10 | End: 2017-11-10 | Stop reason: HOSPADM

## 2017-11-10 RX ORDER — FLUMAZENIL 0.1 MG/ML
0.2 INJECTION INTRAVENOUS
Status: DISCONTINUED | OUTPATIENT
Start: 2017-11-10 | End: 2017-11-10 | Stop reason: HOSPADM

## 2017-11-10 RX ORDER — MIDAZOLAM HYDROCHLORIDE 1 MG/ML
1-2 INJECTION, SOLUTION INTRAMUSCULAR; INTRAVENOUS
Status: DISCONTINUED | OUTPATIENT
Start: 2017-11-10 | End: 2017-11-10 | Stop reason: HOSPADM

## 2017-11-10 RX ORDER — ATROPINE SULFATE 0.1 MG/ML
0.5 INJECTION INTRAVENOUS
Status: DISCONTINUED | OUTPATIENT
Start: 2017-11-10 | End: 2017-11-10 | Stop reason: HOSPADM

## 2017-11-10 RX ADMIN — SODIUM CHLORIDE 25 ML/HR: 900 INJECTION, SOLUTION INTRAVENOUS at 11:51

## 2017-11-10 RX ADMIN — PROPOFOL 160 MG: 10 INJECTION, EMULSION INTRAVENOUS at 12:56

## 2017-11-10 RX ADMIN — LIDOCAINE HYDROCHLORIDE 50 MG: 20 INJECTION, SOLUTION EPIDURAL; INFILTRATION; INTRACAUDAL; PERINEURAL at 12:45

## 2017-11-10 NOTE — H&P
Gastroenterology Outpatient History and Physical    Patient: Adrianna Dominguez    Physician: Seth Sosa MD    Chief Complaint: Dysphagia  History of Present Illness: 75 yo F with dysphagia, hx of radiation stricturing.     History:  Past Medical History:   Diagnosis Date    Cancer ([facility] [address]     Gum (SURGERY/RADIATION)    Diabetes ([facility] [address]     no meds required; controlled by diet and weight    GERD (gastroesophageal reflux disease)     Hypertension     Ill-defined condition     no venipuncture left arm--hx skin graft    Thyroid disease       Past Surgical History:   Procedure Laterality Date    ABDOMEN SURGERY PROC UNLISTED  7/5/2013    Feeding tube placement ( has been removed)    HX GI  10/2016    esoph w/ dilatation    HX GI  1980's    gastric ulcer excision (1/3 stomach removed)    HX HEENT  2013    gum surgery    HX HEENT Bilateral     TUBES IN EARS    HX HEENT      TRACHEOSTOMY    HX ORTHOPAEDIC      broken wrist    HX OTHER SURGICAL  2013    left arm veins/tissue used for oral/facial reconstruction    HX PELON AND BSO      [place] EGD INSERT GUIDE WIRE DILATOR PASSAGE ESOPHAGUS  8/21/2017         UPPER GI ENDOSCOPY,BALL DIL,30MM  10/26/2016         UPPER GI ENDOSCOPY,DILATN W GUIDE  2/27/2017         UPPER GI ENDOSCOPY,DILATN W GUIDE  3/27/2017         UPPER GI ENDOSCOPY,DILATN W GUIDE  4/12/2017         UPPER GI ENDOSCOPY,DILATN W GUIDE  5/1/2017         UPPER GI ENDOSCOPY,DILATN W GUIDE  10/2/2017           Social History     Social History    Marital status:      Spouse name: N/A    Number of children: N/A    Years of education: N/A     Social History Main Topics    Smoking status: Former Smoker     Quit date: 9/30/1980    Smokeless tobacco: Never Used    Alcohol use No    Drug use: No    Sexual activity: Not Asked     Other Topics Concern    None     Social History Narrative      Family History   Problem Relation Age of Onset    Cancer Father      colon    Kidney Disease Mother  Anesth Problems Neg Hx     There is no problem list on file for this patient. Allergies: No Known Allergies  Medications:   Prior to Admission medications    Medication Sig Start Date End Date Taking? Authorizing Provider   sucralfate (CARAFATE) 100 mg/mL suspension Take 10 mL by mouth four (4) times daily. 4/12/17  Yes Adarsh Farr MD   amLODIPine (NORVASC) 10 mg tablet Take 10 mg by mouth every morning. Yes Historical Provider   atorvastatin (LIPITOR) 20 mg tablet Take 20 mg by mouth nightly. Yes Historical Provider   levothyroxine (SYNTHROID) 75 mcg tablet Take 75 mcg by mouth Daily (before breakfast). Yes Historical Provider   MULTIVITAMIN WITH MINERALS (HAIR,SKIN AND NAILS PO) Take  by mouth two (2) times a day. Yes Historical Provider   aspirin 81 mg chewable tablet Take 81 mg by mouth daily. Yes Historical Provider   conjugated estrogens (PREMARIN) 0.625 mg/gram vaginal cream Insert 0.5 g into vagina two (2) times a week. Yes Historical Provider   temazepam (RESTORIL) 15 mg capsule Take  by mouth nightly as needed for Sleep. Yes Historical Provider   peg 400-propylene glycol (SYSTANE, PROPYLENE GLYCOL,) 0.4-0.3 % drop Apply 1 Drop to eye as needed. Both eyes    Historical Provider   acetaminophen (TYLENOL EXTRA STRENGTH) 500 mg tablet Take  by mouth as needed for Pain. Historical Provider     Physical Exam:   Vital Signs: Blood pressure 141/71, pulse 71, temperature 97.9 °F (36.6 °C), resp. rate 20, height 5' 2\" (1.575 m), weight 59.2 kg (130 lb 7 oz), SpO2 98 %.   General: well developed, well nourished   HEENT: unremarkable   Heart: regular rhythm no mumur    Lungs: clear   Abdominal:  benign   Neurological: unremarkable   Extremities: no edema     Findings/Diagnosis: dysphagia  Plan of Care/Planned Procedure: EGD with dilation with conscious/deep sedation    Signed:  Adarsh Farr MD 11/10/2017

## 2017-11-10 NOTE — IP AVS SNAPSHOT
[address] [facility] 
[phone] Patient: Valencia Joel MRN: [id number] UQS:[id number]/[id number] About your hospitalization You were admitted on:  November 10, 2017 You last received care in the:  [facility] ENDOSCOPY You were discharged on:  November 10, 2017 Why you were hospitalized Your primary diagnosis was:  Not on File Things You Need To Do (next 8 weeks) Follow up with Rosita Gaucher, MD  
  
Phone:  [phone] Where:  [address], [address], [facility] Discharge Orders None A check jesus indicates which time of day the medication should be taken. My Medications TAKE these medications as instructed Instructions Each Dose to Equal  
 Morning Noon Evening Bedtime  
 aspirin 81 mg chewable tablet Your last dose was: Your next dose is: Take 81 mg by mouth daily. 81 mg  
    
   
   
   
  
 atorvastatin 20 mg tablet Commonly known as:  LIPITOR Your last dose was: Your next dose is: Take 20 mg by mouth nightly. 20 mg  
    
   
   
   
  
 HAIR,SKIN AND NAILS PO Your last dose was: Your next dose is: Take  by mouth two (2) times a day. levothyroxine 75 mcg tablet Commonly known as:  SYNTHROID Your last dose was: Your next dose is: Take 75 mcg by mouth Daily (before breakfast). 75 mcg NORVASC 10 mg tablet Generic drug:  amLODIPine Your last dose was: Your next dose is: Take 10 mg by mouth every morning. 10 mg PREMARIN 0.625 mg/gram vaginal cream  
Generic drug:  conjugated estrogens Your last dose was: Your next dose is: Insert 0.5 g into vagina two (2) times a week.   
 0.5 g  
    
   
   
   
  
 sucralfate 100 mg/mL suspension Commonly known as:  Eudelia Brocks Your last dose was: Your next dose is: Take 10 mL by mouth four (4) times daily. 1 g  
    
   
   
   
  
 SYSTANE (PROPYLENE GLYCOL) 0.4-0.3 % Drop Generic drug:  peg 400-propylene glycol Your last dose was: Your next dose is:    
   
   
 Apply 1 Drop to eye as needed. Both eyes 1 Drop  
    
   
   
   
  
 temazepam 15 mg capsule Commonly known as:  RESTORIL Your last dose was: Your next dose is: Take  by mouth nightly as needed for Sleep. TYLENOL EXTRA STRENGTH 500 mg tablet Generic drug:  acetaminophen Your last dose was: Your next dose is: Take  by mouth as needed for Pain. Discharge Instructions Kael Esposito [id number] EGD DISCHARGE INSTRUCTIONS Discomfort: 
Sore throat- throat lozenges or warm salt water gargle 
redness at IV site- apply warm compress to area; if redness or soreness persist- contact your physician Gaseous discomfort- walking, belching will help relieve any discomfort You may not operate a vehicle for 12 hours You may not engage in an occupation involving machinery or appliances for rest of today You may not drink alcoholic beverages for at least 12 hours Avoid making any critical decisions for at least 24 hour DIET You may have minimal sips at this time-- do not eat or drink for two hours. You may eat and drink after you wake up You may resume your regular diet  however -  remember your colon is empty and a heavy meal will produce gas. Avoid these foods:  vegetables, fried / greasy foods, carbonated drinks MEDICATIONS: 
See attached ACTIVITY You may resume your normal daily activities until tomorrow AM; 
Spend the remainder of the day resting -  avoid any strenuous activity. CALL M.D.   ANY SIGN OF  
 Increasing pain, nausea, vomiting Abdominal distension (swelling) New increased bleeding (oral or rectal) Fever (chills) Pain in chest area Bloody discharge from nose or mouth Shortness of breath You may not take any Advil, Aspirin, Ibuprofen, Motrin, Aleve, or Goodys for 10 days, ONLY  Tylenol as needed for pain. IMPRESSION: 
-- successful dilation, quite large dilation today. We can look again in 8 weeks or so, but we can decide based on symptoms Follow-up Instructions: 
 Call Dr. Danyel Villa for the results of procedure / biopsy in 7-10 days Telephone # [phone] Repeat upper endoscopy in 8 weeks Lanice Halsted, MD 
[employer] Activation Thank you for requesting access to [employer]. Please follow the instructions below to securely access and download your online medical record. [employer] allows you to send messages to your doctor, view your test results, renew your prescriptions, schedule appointments, and more. How Do I Sign Up? 1. In your internet browser, go to [email or web address] 
2. Click on the First Time User? Click Here link in the Sign In box. You will be redirect to the New Member Sign Up page. 3. Enter your [employer] Access Code exactly as it appears below. You will not need to use this code after youve completed the sign-up process. If you do not sign up before the expiration date, you must request a new code. [employer] Access Code: [email or web address] Expires: 2017  9:38 AM (This is the date your [employer] access code will ) 4. Enter the last four digits of your Social Security Number (xxxx) and Date of Birth (mm/dd/yyyy) as indicated and click Submit. You will be taken to the next sign-up page. 5. Create a [employer] ID. This will be your [employer] login ID and cannot be changed, so think of one that is secure and easy to remember. 6. Create a [employer] password. You can change your password at any time. 7. Enter your Password Reset Question and Answer. This can be used at a later time if you forget your password. 8. Enter your e-mail address. You will receive e-mail notification when new information is available in [address] 9. Click Sign Up. You can now view and download portions of your medical record. 10. Click the Download Summary menu link to download a portable copy of your medical information. Additional Information If you have questions, please visit the Frequently Asked Questions section of the [employer] website at [email or web address] Remember, [employer] is NOT to be used for urgent needs. For medical emergencies, dial 911. Introducing [facility] SERVICES! Belen Giordano introduces [employer] patient portal. Now you can access parts of your medical record, email your doctor's office, and request medication refills online. 1. In your internet browser, go to [email or web address] 2. Click on the First Time User? Click Here link in the Sign In box. You will see the New Member Sign Up page. 3. Enter your [employer] Access Code exactly as it appears below. You will not need to use this code after youve completed the sign-up process. If you do not sign up before the expiration date, you must request a new code. · [employer] Access Code: [email or web address] Expires: 11/19/2017  9:38 AM 
 
4. Enter the last four digits of your Social Security Number (xxxx) and Date of Birth (mm/dd/yyyy) as indicated and click Submit. You will be taken to the next sign-up page. 5. Create a [employer] ID. This will be your [employer] login ID and cannot be changed, so think of one that is secure and easy to remember. 6. Create a [employer] password. You can change your password at any time. 7. Enter your Password Reset Question and Answer. This can be used at a later time if you forget your password. 8. Enter your e-mail address.  You will receive e-mail notification when new information is available in [email or web address]. 9. Click Sign Up. You can now view and download portions of your medical record. 10. Click the Download Summary menu link to download a portable copy of your medical information. If you have questions, please visit the Frequently Asked Questions section of the [employer] website. Remember, [employer] is NOT to be used for urgent needs. For medical emergencies, dial 911. Now available from your iPhone and Android! Providers Seen During Your Hospitalization Provider Specialty Primary office phone Sunny Joe MD Gastroenterology [phone] Your Primary Care Physician (PCP) Primary Care Physician Office Phone Office Fax 3 [facility], [address] [facility] [phone] You are allergic to the following No active allergies Recent Documentation Height Weight BMI OB Status Smoking Status 1.575 m 59.2 kg 23.86 kg/m2 Hysterectomy Former Smoker Emergency Contacts Name Discharge Info Relation Home Work Mobile Izzy [employer] CAREGIVER [3] Spouse [3] [phone] Patient Belongings The following personal items are in your possession at time of discharge: 
  Dental Appliances: Uppers, With patient  Visual Aid: Glasses Please provide this summary of care documentation to your next provider. Signatures-by signing, you are acknowledging that this After Visit Summary has been reviewed with you and you have received a copy. Patient Signature:  ____________________________________________________________ Date:  ____________________________________________________________  
  
Graciela Rinaldi Provider Signature:  ____________________________________________________________ Date:  ____________________________________________________________

## 2017-11-10 NOTE — PROCEDURES
NAME:  Bre Nevarez   :   1945   MRN:   [id number]     Date/Time:  11/10/2017 10:47 AM    Esophagogastroduodenoscopy (EGD) Procedure Note    Procedure: Esophagogastroduodenoscopy with esophageal dilation    Indication: Esophageal stricture, likely from radiation  Pre-operative Diagnosis: see indication above  Post-operative Diagnosis: see findings below  :  Toni Lazo MD  Referring Provider:   Valorie Ramirez MD    Exam:  Airway: clear, no airway problems anticipated  Heart: RRR, without gallops or rubs  Lungs: clear bilaterally without wheezes, crackles, or rhonchi  Abdomen: soft, nontender, nondistended, bowel sounds present  Mental Status: awake, alert and oriented to person, place and time     Anethesia/Sedation:  MAC anesthesia Propofol  Procedure Details   After informed consent was obtained for the procedure, with all risks and benefits of procedure explained the patient was taken to the endoscopy suite and placed in the left lateral decubitus position. Following sequential administration of sedation as per above, the [id number] gastroscope was inserted into the mouth and advanced under direct vision to second portion of the duodenum. A careful inspection was made as the gastroscope was withdrawn, including a retroflexed view of the proximal stomach; findings and interventions are described below. Findings:   OROPHARYNX: Cords and pyriform recesses with some edema  ESOPHAGUS:   -- mid to distal esophagus with multiple concentric narrowed rings, and just barely allowed passage of the upper endoscope with mild resistance. -- dilated to 51 fr, 54 fr, 60 fr with noted shear effect using the bougie dilators over the wire. Re-look endoscopy not performed. STOMACH: The fundus on antegrade and retroflex views reveals a hiatal hernia. The body is normal. Antrum and pylorus are surgically absent.   SMALL INTESTINE: normal mucosa    Therapies:  esophageal dilation with 51 fr, 54 fr, 60 fr bougie dilators    Specimens: none    EBL:  None. Complications:   None; patient tolerated the procedure well. Impression:    -- narrowed, concentric stricturing in mid and distal esophagus, dilated to max of 51 fr with bougie dilators over the wire  -- hiatal hernia  -- history of radiation, the likely source of esophageal stricturing  -- surgical changes from previous antrectomy    Recommendations:  -- Continue acid suppression.   -- repeat EGD in 4-8  weeks for repeat dilation or as needed  -- Carafate QID x 10 days    Discharge disposition:  Home in the company of  when able to ambulate    Lanice Halsted, MD

## 2017-11-10 NOTE — ANESTHESIA POSTPROCEDURE EVALUATION
Post-Anesthesia Evaluation and Assessment    Patient: Adrianna Dominguez MRN: [id number]  SSN: [id number]    YOB: 1945  Age: 74 y.o. Sex: female       Cardiovascular Function/Vital Signs  Visit Vitals    /43    Pulse 64    Temp 36.2 °C (97.2 °F)    Resp 17    Ht 5' 2\" (1.575 m)    Wt 59.2 kg (130 lb 7 oz)    SpO2 97%    BMI 23.86 kg/m2       Patient is status post total IV anesthesia anesthesia for Procedure(s):  ESOPHAGEAL DILATION  ESOPHAGOGASTRODUODENOSCOPY (EGD). Nausea/Vomiting: None    Postoperative hydration reviewed and adequate. Pain:  Pain Scale 1: Numeric (0 - 10) (11/10/17 1315)  Pain Intensity 1: 0 (11/10/17 1315)   Managed    Neurological Status: At baseline    Mental Status and Level of Consciousness: Arousable    Pulmonary Status:   O2 Device: Room air (11/10/17 1315)   Adequate oxygenation and airway patent    Complications related to anesthesia: None    Post-anesthesia assessment completed.  No concerns    Signed By: Osiel Gudino DO     November 10, 2017

## 2017-11-10 NOTE — PERIOP NOTES
Rocky Yoselin  1945  [id number]    Situation:  Verbal report received from: Jefferson Juarez RN  Procedure: Procedure(s):  ESOPHAGEAL DILATION  ESOPHAGOGASTRODUODENOSCOPY (EGD)    Background:    Preoperative diagnosis: PHARYNGOESOPHAGEAL DYSPHAGIA  Postoperative diagnosis: Esophageal strictue, dysphagia    :  Dr. Marciano Trinidad  Assistant(s): Endoscopy Technician-1: Chastity Dobbs  Endoscopy RN-1: Perfecto Morgan RN    Specimens: * No specimens in log *  H. Pylori  no    Assessment:  Intra-procedure medications     Anesthesia gave intra-procedure sedation and medications, see anesthesia flow sheet yes    Intravenous fluids: NS@ KVO     Vital signs stable     Abdominal assessment: round and soft     Recommendation:  Discharge patient per MD order  Family or Friend   Permission to share finding with family or friend yes

## 2017-11-10 NOTE — DISCHARGE INSTRUCTIONS
Debi Degroot  [id number]  1945    EGD DISCHARGE INSTRUCTIONS  Discomfort:  Sore throat- throat lozenges or warm salt water gargle  redness at IV site- apply warm compress to area; if redness or soreness persist- contact your physician  Gaseous discomfort- walking, belching will help relieve any discomfort  You may not operate a vehicle for 12 hours  You may not engage in an occupation involving machinery or appliances for rest of today  You may not drink alcoholic beverages for at least 12 hours  Avoid making any critical decisions for at least 24 hour  DIET  You may have minimal sips at this time-- do not eat or drink for two hours. You may eat and drink after you wake up  You may resume your regular diet - however -  remember your colon is empty and a heavy meal will produce gas. Avoid these foods:  vegetables, fried / greasy foods, carbonated drinks    MEDICATIONS:  See attached    ACTIVITY  You may resume your normal daily activities until tomorrow AM;  Spend the remainder of the day resting -  avoid any strenuous activity. CALL M.D. ANY SIGN OF   Increasing pain, nausea, vomiting  Abdominal distension (swelling)  New increased bleeding (oral or rectal)  Fever (chills)  Pain in chest area  Bloody discharge from nose or mouth  Shortness of breath    You may not take any Advil, Aspirin, Ibuprofen, Motrin, Aleve, or Goodys for 10 days, ONLY  Tylenol as needed for pain. IMPRESSION:  -- successful dilation, quite large dilation today. We can look again in 8 weeks or so, but we can decide based on symptoms    Follow-up Instructions:   Call Dr. Danyel Villa for the results of procedure / biopsy in 7-10 days   Telephone # [phone]  Repeat upper endoscopy in 8 weeks     Lanice Halsted, MD  [employer]    Thank you for requesting access to [address] Please follow the instructions below to securely access and download your online medical record.  [employer] allows you to send messages to your doctor, view your test results, renew your prescriptions, schedule appointments, and more. How Do I Sign Up? 1. In your internet browser, go to [email or web address]  2. Click on the First Time User? Click Here link in the Sign In box. You will be redirect to the New Member Sign Up page. 3. Enter your [employer] Access Code exactly as it appears below. You will not need to use this code after youve completed the sign-up process. If you do not sign up before the expiration date, you must request a new code. [employer] Access Code: [email or web address]  Expires: 2017  9:38 AM (This is the date your [employer] access code will )    4. Enter the last four digits of your Social Security Number (xxxx) and Date of Birth (mm/dd/yyyy) as indicated and click Submit. You will be taken to the next sign-up page. 5. Create a [employer] ID. This will be your [employer] login ID and cannot be changed, so think of one that is secure and easy to remember. 6. Create a [employer] password. You can change your password at any time. 7. Enter your Password Reset Question and Answer. This can be used at a later time if you forget your password. 8. Enter your e-mail address. You will receive e-mail notification when new information is available in [address] 9. Click Sign Up. You can now view and download portions of your medical record. 10. Click the Download Summary menu link to download a portable copy of your medical information. Additional Information    If you have questions, please visit the Frequently Asked Questions section of the [employer] website at [email or web address] Remember, [employer] is NOT to be used for urgent needs. For medical emergencies, dial 911.

## 2017-11-10 NOTE — ANESTHESIA PREPROCEDURE EVALUATION
Anesthetic History   No history of anesthetic complications            Review of Systems / Medical History  Patient summary reviewed, nursing notes reviewed and pertinent labs reviewed    Pulmonary                Comments: Former Smoker quit in 34   Neuro/Psych   Within defined limits           Cardiovascular    Hypertension: well controlled          Hyperlipidemia    Exercise tolerance: >4 METS  Comments:  EKG: NSR   GI/Hepatic/Renal     GERD      PUD (1/3 of stomach removed)    Comments: ESOPHAGEAL STRICTURE  DYSPHAGIA     Endo/Other    Diabetes: well controlled, type 2  Hypothyroidism: well controlled  Cancer (Gum cancer with surgery and radiation)     Other Findings   Comments: Hx gum cancer  Gastric cancer           Physical Exam    Airway  Mallampati: III    Neck ROM: normal range of motion   Mouth opening: Diminished (comment)     Cardiovascular  Regular rate and rhythm,  S1 and S2 normal,  no murmur, click, rub, or gallop             Dental    Dentition: Full upper dentures and Implants     Pulmonary  Breath sounds clear to auscultation               Abdominal  GI exam deferred       Other Findings            Anesthetic Plan    ASA: 3  Anesthesia type: total IV anesthesia          Induction: Intravenous  Anesthetic plan and risks discussed with: Patient

## 2018-01-10 ENCOUNTER — HOSPITAL ENCOUNTER (OUTPATIENT)
Dept: MAMMOGRAPHY | Age: 73
Discharge: HOME | End: 2018-01-10
Attending: FAMILY MEDICINE
Payer: MEDICARE

## 2018-01-10 DIAGNOSIS — Z12.39 SCREENING FOR BREAST CANCER: ICD-10-CM

## 2018-01-10 PROCEDURE — 77067 SCR MAMMO BI INCL CAD: CPT

## 2018-09-28 ENCOUNTER — HOSPITAL ENCOUNTER (OUTPATIENT)
Dept: GENERAL RADIOLOGY | Age: 73
Discharge: HOME | End: 2018-09-28
Payer: MEDICARE

## 2018-09-28 DIAGNOSIS — R52 PAIN: ICD-10-CM

## 2018-09-28 PROCEDURE — 71046 X-RAY EXAM CHEST 2 VIEWS: CPT

## 2019-01-21 ENCOUNTER — HOSPITAL ENCOUNTER (OUTPATIENT)
Dept: MAMMOGRAPHY | Age: 74
Discharge: HOME | End: 2019-01-21
Attending: FAMILY MEDICINE
Payer: MEDICARE

## 2019-01-21 DIAGNOSIS — Z12.39 SCREENING BREAST EXAMINATION: ICD-10-CM

## 2019-01-21 PROCEDURE — 77067 SCR MAMMO BI INCL CAD: CPT

## 2019-01-28 ENCOUNTER — HOSPITAL ENCOUNTER (OUTPATIENT)
Dept: MAMMOGRAPHY | Age: 74
Discharge: HOME | End: 2019-01-28
Attending: FAMILY MEDICINE
Payer: MEDICARE

## 2019-01-28 DIAGNOSIS — R92.8 ABNORMAL MAMMOGRAM OF RIGHT BREAST: ICD-10-CM

## 2019-01-28 PROCEDURE — 77065 DX MAMMO INCL CAD UNI: CPT

## 2019-01-28 NOTE — PROGRESS NOTES
Dr Nel Jacobo spoke w/pt concerning recommendation for right breast stereo breast biopsy. Left message for Dr. Christiano Arshad or her nurse. Pt stated that she does not drive on [place] and request for appointment to be close to [facility] or near. 5 - Dr. Marquez Jones nurse called back advising to refer pt to Dr. Sury Murcia or [facility]. I advised Ms. Johnathon Kingsley requested, referred to DR OSVALDO KELLY [facility].    [phone] - left message on DR OSVALDO KELLY [facility] nurse line.

## 2019-01-28 NOTE — PROGRESS NOTES
Spoke w/Dr. Dl Graves advised that if patient does not want to travel to [place] for biopsy and prefers to stay in the [place] area. Following protocol, Ms. Kitty Allen is a new patient to Dr. Favio Hebert, need to set up an appointment to establish and then Dr. Favio Hebert can write orders. Set up first available appointment w/Dr. Dl Graves on Wednesday, 2/6/19 @ 2 pm, arrival at 130. Called Ms. Kitty Allen to confirm appointment time w/Verified location [facility], [facility]) suite & contact number which Ms. Kityt Allen stated she wrote down.    Advised after she meets w/Dr. Favio Hebert she is able to write order for stereo biopsy.  (Ms. Kitty Allen understood)

## 2019-02-06 ENCOUNTER — OFFICE VISIT (OUTPATIENT)
Dept: SURGERY | Age: 74
End: 2019-02-06

## 2019-02-06 ENCOUNTER — TELEPHONE (OUTPATIENT)
Dept: MAMMOGRAPHY | Age: 74
End: 2019-02-06

## 2019-02-06 VITALS
SYSTOLIC BLOOD PRESSURE: 177 MMHG | DIASTOLIC BLOOD PRESSURE: 97 MMHG | HEIGHT: 62 IN | HEART RATE: 75 BPM | BODY MASS INDEX: 25.4 KG/M2 | WEIGHT: 138 LBS

## 2019-02-06 DIAGNOSIS — R92.1 BREAST CALCIFICATIONS ON MAMMOGRAM: Primary | ICD-10-CM

## 2019-02-06 NOTE — TELEPHONE ENCOUNTER
Received email from Tad, yesenia biopsy order in [facility]. Called MsHector nAthony Richards and schedule stereo biopsy for Monday, 2/11/19 @ 11am, arriving at 1030. Scheduled akosua/Suzanne @ [phone] advising for [facility] to get authorization.

## 2019-02-06 NOTE — PATIENT INSTRUCTIONS
Stereotactic Breast Biopsy: About This Test  What is it? Stereotactic breast biopsy is a test that uses imaging, such as X-ray, to find an area of your breast where a tissue sample will be taken. The sample is looked at under a microscope to check for signs of breast cancer. Why is this test done? This type of breast biopsy is usually done to check for cancer in a lump found during a mammogram.  How can you prepare for the test?  Talk to your doctor about all your health conditions before the test. For example, tell your doctor if you:  · Are taking any medicines. · Are allergic to any medicines. · Are allergic to latex. · Have had bleeding problems, or if you take aspirin or some other blood thinner. · Are or might be pregnant. · Have a problem with lying on your stomach for 30 to 40 minutes. What happens before the test?  · You will take off your clothing above the waist. A paper or cloth gown will cover your shoulders. · Your skin is washed with a special soap. · You will be given a shot of medicine to numb the biopsy area on your breast.  · Images are taken to find the exact site for the biopsy. What happens during the test?  · You will lie on your stomach on a table that has a hole for your breast to hang through. · Once the area in your breast is numb, a small cut (incision) is made in the skin. · Using the imaging, the doctor will guide the needle into the biopsy area. · A sample of breast tissue is taken through the needle. · A small clip is usually inserted into your breast to jesus the biopsy site. · The needle is removed and pressure put on the needle site to stop any bleeding. · A bandage is put on the needle site. What else should you know about the test?  · The small cut for the needle does not usually need stitches. How long does the test take? · The test will take about 60 minutes. Most of the time is spent preparing for the images and finding the area for the biopsy.   What happens after the test?  · You'll be told how long it may take to get your results back. · You will probably be able to go home right away. · You can go back to your usual activities right away, but avoid heavy lifting for 24 hours. · The site may be tender for 2 or 3 days. You may also have some bruising, swelling, or slight bleeding. ? You can use an ice pack. Put ice or a cold pack on the area for 10 to 20 minutes at a time. Put a thin cloth between the ice and your skin. ? Ask your doctor if you can take an over-the-counter pain medicine, such as acetaminophen (Tylenol), ibuprofen (Advil, Motrin), or naproxen (Aleve). Be safe with medicines. Read and follow all instructions on the label. · After a specialist looks at the biopsy sample for signs of cancer, your doctor's office will let you know the results. · If the test results are not clear, you may have another biopsy or test.  Follow-up care is a key part of your treatment and safety. Be sure to make and go to all appointments, and call your doctor if you are having problems. It's also a good idea to keep a list of the medicines you take. Ask your doctor when you can expect to have your test results. Where can you learn more? Go to http://isabel-luisa.info/. Enter Y286 in the search box to learn more about \"Stereotactic Breast Biopsy: About This Test.\"  Current as of: March 27, 2018  Content Version: 11.9  © 4310-0739 [employer]. Care instructions adapted under license by [employer] (which disclaims liability or warranty for this information). If you have questions about a medical condition or this instruction, always ask your healthcare professional. [address] any warranty or liability for your use of this information.

## 2019-02-06 NOTE — COMMUNICATION BODY
HISTORY OF PRESENT ILLNESS  Kaylee Mary is a 66 y.o. female. HPI NEW Patient presents for consultation at the request of Dr. Marycruz Mixon for RIGHT breast calcifications. The patient has no abnormal breast symptoms to report. History of \"mammary gland bleeding\" from LEFT breast about 40 years ago. Benign, per patient. The patient has a personal history of gum cancer, treated with surgery and radiation. Blood pressure elevated in office today 177/97 - patient states that Dr. Meagan Walters currently has her off of Amlodipine. Family history-  Maternal cousin had breast cancer (unsure what age). Father had colon cancer. Breast imaging-  Screening mammogram 1/21/19 BI-RADS 0.  RIGHT breast diagnostic mammogram 1/28/19:  INDICATION:  Abnormal Screening Mammogram. Right calcifications. EXAM: Right Diagnostic Mammogram.  COMPARISON: 1/21/2019  UNILATERAL DIAGNOSTIC DIGITAL MAMMOGRAPHY of the right breast with CAD is  performed to evaluate calcifications seen on screening mammogram. Dedicated   imaging today of the area of mammographic concern shows suspicious grouped  calcifications in the 12:00 location anterior to mid depth which stereotactic  biopsy is recommended. This has been discussed with the patient. Breast Composition: Scattered fibroglandular densities. IMPRESSION:  1. BI-RADS Assessment Category 4: Suspicious abnormality -right calcifications. 2. Stereotactic biopsy recommended. 3. Patient has been provided a surgical consultation appointment with Dr. Antonio Joseph February 6.      Past Medical History:   Diagnosis Date    Cancer ([address]     Gum (SURGERY/RADIATION)    Diabetes ([address]     no meds required; controlled by diet and weight    GERD (gastroesophageal reflux disease)     Hypertension     Ill-defined condition     no venipuncture left arm--hx skin graft    Thyroid disease      Past Surgical History:   Procedure Laterality Date    ABDOMEN SURGERY PROC UNLISTED  7/5/2013    Feeding tube placement ( has been removed)    HX BREAST BIOPSY Left     early [de-identified]    HX GI  10/2016    esoph w/ dilatation    HX GI      gastric ulcer excision (13 stomach removed)    HX HEENT      gum surgery    HX HEENT Bilateral     TUBES IN EARS    HX HEENT      TRACHEOSTOMY    HX ORTHOPAEDIC      broken wrist    HX OTHER SURGICAL      left arm veins/tissue used for oral/facial reconstruction    HX PELON AND BSO      MS EGD INSERT GUIDE WIRE DILATOR PASSAGE ESOPHAGUS  2017         UPPER GI ENDOSCOPY,BALL DIL,30MM  10/26/2016         UPPER GI ENDOSCOPY,DILATN W GUIDE  2017         UPPER GI ENDOSCOPY,DILATN W GUIDE  3/27/2017         UPPER GI ENDOSCOPY,DILATN W GUIDE  2017         UPPER GI ENDOSCOPY,DILATN W GUIDE  2017         UPPER GI ENDOSCOPY,DILATN W GUIDE  10/2/2017         UPPER GI ENDOSCOPY,DILATN W GUIDE  11/10/2017          Social History     Socioeconomic History    Marital status:      Spouse name: Not on file    Number of children: Not on file    Years of education: Not on file    Highest education level: Not on file   Social Needs    Financial resource strain: Not on file    Food insecurity - worry: Not on file    Food insecurity - inability: Not on file   [employer] needs - medical: Not on file   [employer] needs - non-medical: Not on file   Occupational History    Not on file   Tobacco Use    Smoking status: Former Smoker     Last attempt to quit: 1980     Years since quittin.3    Smokeless tobacco: Never Used   Substance and Sexual Activity    Alcohol use: No    Drug use: No    Sexual activity: Not on file   Other Topics Concern    Not on file   Social History Narrative    Not on file     OB History     Obstetric Comments    Menarche:  12. LMP: . # of Children:  2. Age at Delivery of First Child:  19.   Hysterectomy/oophorectomy:  YES/YES. Breast Bx:  yes. Hx of Breast Feeding:  no. BCP:  yes. Hormone therapy:  yes. Current Outpatient Medications:     OTHER, 10 mg. Eye Promise 10 mg, Disp: , Rfl:     atorvastatin (LIPITOR) 20 mg tablet, Take 20 mg by mouth nightly., Disp: , Rfl:     levothyroxine (SYNTHROID) 75 mcg tablet, Take 75 mcg by mouth Daily (before breakfast). , Disp: , Rfl:     MULTIVITAMIN WITH MINERALS (HAIR,SKIN AND NAILS PO), Take  by mouth two (2) times a day., Disp: , Rfl:     aspirin 81 mg chewable tablet, Take 81 mg by mouth daily. , Disp: , Rfl:     temazepam (RESTORIL) 15 mg capsule, Take  by mouth nightly as needed for Sleep., Disp: , Rfl:     peg 400-propylene glycol (SYSTANE, PROPYLENE GLYCOL,) 0.4-0.3 % drop, Apply 1 Drop to eye as needed. Both eyes, Disp: , Rfl:     amLODIPine (NORVASC) 10 mg tablet, Take 10 mg by mouth every morning., Disp: , Rfl:     acetaminophen (TYLENOL EXTRA STRENGTH) 500 mg tablet, Take  by mouth as needed for Pain., Disp: , Rfl:     conjugated estrogens (PREMARIN) 0.625 mg/gram vaginal cream, Insert 0.5 g into vagina two (2) times a week., Disp: , Rfl:   No Known Allergies    Review of Systems   Constitutional: Negative. HENT: Negative. Eyes: Negative. Respiratory: Negative. Cardiovascular: Negative. Gastrointestinal: Positive for heartburn. Genitourinary: Positive for frequency. Musculoskeletal: Negative. Skin: Negative. Neurological: Negative. Endo/Heme/Allergies: Negative. Psychiatric/Behavioral: Negative. All other systems reviewed and are negative. Physical Exam   Constitutional: She is oriented to person, place, and time. She appears well-developed and well-nourished. HENT:   Head: Normocephalic. Eyes: EOM are normal.   Neck: Neck supple. No thyromegaly present. Cardiovascular: Intact distal pulses. Pulmonary/Chest: Effort normal. Right breast exhibits no inverted nipple, no mass, no nipple discharge, no skin change and no tenderness.  Left breast exhibits no inverted nipple, no mass, no nipple discharge, no skin change and no tenderness. Breasts are symmetrical.   Abdominal: Soft. Musculoskeletal: Normal range of motion. Lymphadenopathy:     She has no cervical adenopathy. She has no axillary adenopathy. Neurological: She is alert and oriented to person, place, and time. Skin: Skin is warm and dry. Psychiatric: She has a normal mood and affect. Nursing note and vitals reviewed. ASSESSMENT and PLAN    ICD-10-CM ICD-9-CM    1. Breast calcifications on mammogram R92.1 793.89 ARIAN STEREO VAC  BX BREAST RT 1ST LESION W/CLIP AND SPECIMEN     65 yo female with abnormal calcifications right breast.   I have reviewed the imaging. Will set her up with stereotactic breast biopsy at her location preference which is [facility]. Will call her with results.

## 2019-02-06 NOTE — LETTER
2/6/2019 1:53 PM 
 
Patient:  Yo Romo YOB: 1945 Date of Visit: 2/6/2019 Dear Burgess Dejan MD 
[address] [address] VIA Facsimile: [phone] 
 : 
 
 
Thank you for referring Ms. Yue Bagley to me for evaluation/treatment. Below are the relevant portions of my assessment and plan of care. HISTORY OF PRESENT ILLNESS Yo Romo is a 66 y.o. female. HPI NEW Patient presents for consultation at the request of Dr. Eder Feliciano for RIGHT breast calcifications. The patient has no abnormal breast symptoms to report. History of \"mammary gland bleeding\" from LEFT breast about 40 years ago. Benign, per patient. The patient has a personal history of gum cancer, treated with surgery and radiation. Blood pressure elevated in office today 177/97 - patient states that Dr. Cristobal Gomez currently has her off of Amlodipine. Family history- 
Maternal cousin had breast cancer (unsure what age). Father had colon cancer. Breast imaging- 
Screening mammogram 1/21/19 BI-RADS 0. 
RIGHT breast diagnostic mammogram 1/28/19: 
INDICATION:  Abnormal Screening Mammogram. Right calcifications. EXAM: Right Diagnostic Mammogram. 
COMPARISON: 1/21/2019 UNILATERAL DIAGNOSTIC DIGITAL MAMMOGRAPHY of the right breast with CAD is 
performed to evaluate calcifications seen on screening mammogram. Dedicated  
imaging today of the area of mammographic concern shows suspicious grouped 
calcifications in the 12:00 location anterior to mid depth which stereotactic 
biopsy is recommended. This has been discussed with the patient. Breast Composition: Scattered fibroglandular densities. IMPRESSION: 
1. BI-RADS Assessment Category 4: Suspicious abnormality -right calcifications. 2. Stereotactic biopsy recommended. 3. Patient has been provided a surgical consultation appointment with Dr. Rosenda Zabala February 6. Past Medical History:  
Diagnosis Date  Cancer ([facility] [address] Gum (SURGERY/RADIATION)  Diabetes ([facility] [address] [facility]   
 no meds required; controlled by diet and weight  GERD (gastroesophageal reflux disease)  Hypertension  Ill-defined condition   
 no venipuncture left arm--hx skin graft  Thyroid disease Past Surgical History:  
Procedure Laterality Date  ABDOMEN SURGERY PROC UNLISTED  2013 Feeding tube placement ( has been removed)  HX BREAST BIOPSY Left   
 early   HX GI  10/2016  
 esoph w/ dilatation  HX GI    
 gastric ulcer excision (1/3 stomach removed)  HX HEENT    
 gum surgery  HX HEENT Bilateral   
 TUBES IN EARS  
 HX HEENT    
 TRACHEOSTOMY  HX ORTHOPAEDIC    
 broken wrist  
 HX OTHER SURGICAL    
 left arm veins/tissue used for oral/facial reconstruction  HX PELON AND BSO  [place] EGD INSERT GUIDE WIRE DILATOR PASSAGE ESOPHAGUS  2017  UPPER GI ENDOSCOPY,BALL DIL,30MM  10/26/2016  UPPER GI ENDOSCOPY,DILATN W GUIDE  2017  UPPER GI ENDOSCOPY,DILATN W GUIDE  3/27/2017  UPPER GI ENDOSCOPY,DILATN W GUIDE  2017  UPPER GI ENDOSCOPY,DILATN W GUIDE  2017  UPPER GI ENDOSCOPY,DILATN W GUIDE  10/2/2017  UPPER GI ENDOSCOPY,DILATN W GUIDE  11/10/2017 Social History Socioeconomic History  Marital status:  Spouse name: Not on file  Number of children: Not on file  Years of education: Not on file  Highest education level: Not on file Social Needs  Financial resource strain: Not on file  Food insecurity - worry: Not on file  Food insecurity - inability: Not on file  Transportation needs - medical: Not on file  Transportation needs - non-medical: Not on file Occupational History  Not on file Tobacco Use  Smoking status: Former Smoker Last attempt to quit: 1980 Years since quittin.3  Smokeless tobacco: Never Used Substance and Sexual Activity  Alcohol use: No  
 Drug use: No  
 Sexual activity: Not on file Other Topics Concern  Not on file Social History Narrative  Not on file OB History Obstetric Comments Menarche:  13. LMP: 1980's. # of Children:  2. Age at Delivery of First Child:  19.   Hysterectomy/oophorectomy:  YES/YES. Breast Bx:  yes. Hx of Breast Feeding:  no. BCP:  yes. Hormone therapy:  yes. Current Outpatient Medications:  
  OTHER, 10 mg. Eye Promise 10 mg, Disp: , Rfl:  
  atorvastatin (LIPITOR) 20 mg tablet, Take 20 mg by mouth nightly., Disp: , Rfl:  
  levothyroxine (SYNTHROID) 75 mcg tablet, Take 75 mcg by mouth Daily (before breakfast). , Disp: , Rfl:  
  MULTIVITAMIN WITH MINERALS (HAIR,SKIN AND NAILS PO), Take  by mouth two (2) times a day., Disp: , Rfl:  
  aspirin 81 mg chewable tablet, Take 81 mg by mouth daily. , Disp: , Rfl:  
  temazepam (RESTORIL) 15 mg capsule, Take  by mouth nightly as needed for Sleep., Disp: , Rfl:  
  peg 400-propylene glycol (SYSTANE, PROPYLENE GLYCOL,) 0.4-0.3 % drop, Apply 1 Drop to eye as needed. Both eyes, Disp: , Rfl:  
  amLODIPine (NORVASC) 10 mg tablet, Take 10 mg by mouth every morning., Disp: , Rfl:  
  acetaminophen (TYLENOL EXTRA STRENGTH) 500 mg tablet, Take  by mouth as needed for Pain., Disp: , Rfl:  
  conjugated estrogens (PREMARIN) 0.625 mg/gram vaginal cream, Insert 0.5 g into vagina two (2) times a week., Disp: , Rfl:  
No Known Allergies Review of Systems Constitutional: Negative. HENT: Negative. Eyes: Negative. Respiratory: Negative. Cardiovascular: Negative. Gastrointestinal: Positive for heartburn. Genitourinary: Positive for frequency. Musculoskeletal: Negative. Skin: Negative. Neurological: Negative. Endo/Heme/Allergies: Negative. Psychiatric/Behavioral: Negative. All other systems reviewed and are negative.  
 
 
Physical Exam  
 Constitutional: She is oriented to person, place, and time. She appears well-developed and well-nourished. HENT:  
Head: Normocephalic. Eyes: EOM are normal.  
Neck: Neck supple. No thyromegaly present. Cardiovascular: Intact distal pulses. Pulmonary/Chest: Effort normal. Right breast exhibits no inverted nipple, no mass, no nipple discharge, no skin change and no tenderness. Left breast exhibits no inverted nipple, no mass, no nipple discharge, no skin change and no tenderness. Breasts are symmetrical.  
Abdominal: Soft. Musculoskeletal: Normal range of motion. Lymphadenopathy:  
  She has no cervical adenopathy. She has no axillary adenopathy. Neurological: She is alert and oriented to person, place, and time. Skin: Skin is warm and dry. Psychiatric: She has a normal mood and affect. Nursing note and vitals reviewed. ASSESSMENT and PLAN 
  ICD-10-CM ICD-9-CM 1. Breast calcifications on mammogram R92.1 793.89 ARIAN STEREO VAC  BX BREAST RT 1ST LESION W/CLIP AND SPECIMEN  
 
67 yo female with abnormal calcifications right breast.  
I have reviewed the imaging. Will set her up with stereotactic breast biopsy at her location preference which is [facility]. Will call her with results. If you have questions, please do not hesitate to call me. I look forward to following Ms. Oliva Clark along with you. Sincerely, Lavon Herrera MD

## 2019-02-06 NOTE — PROGRESS NOTES
HISTORY OF PRESENT ILLNESS  Kaylee Corey is a 66 y.o. female. HPI NEW Patient presents for consultation at the request of Dr. Ramone Welch for RIGHT breast calcifications. The patient has no abnormal breast symptoms to report. History of \"mammary gland bleeding\" from LEFT breast about 40 years ago. Benign, per patient. The patient has a personal history of gum cancer, treated with surgery and radiation. Blood pressure elevated in office today 177/97 - patient states that Dr. Dianna Leslie currently has her off of Amlodipine. Family history-  Maternal cousin had breast cancer (unsure what age). Father had colon cancer. Breast imaging-  Screening mammogram 1/21/19 BI-RADS 0.  RIGHT breast diagnostic mammogram 1/28/19:  INDICATION:  Abnormal Screening Mammogram. Right calcifications. EXAM: Right Diagnostic Mammogram.  COMPARISON: 1/21/2019  UNILATERAL DIAGNOSTIC DIGITAL MAMMOGRAPHY of the right breast with CAD is  performed to evaluate calcifications seen on screening mammogram. Dedicated   imaging today of the area of mammographic concern shows suspicious grouped  calcifications in the 12:00 location anterior to mid depth which stereotactic  biopsy is recommended. This has been discussed with the patient. Breast Composition: Scattered fibroglandular densities. IMPRESSION:  1. BI-RADS Assessment Category 4: Suspicious abnormality -right calcifications. 2. Stereotactic biopsy recommended. 3. Patient has been provided a surgical consultation appointment with Dr. Vandana Myers February 6.      Past Medical History:   Diagnosis Date    Cancer ([address]     Gum (SURGERY/RADIATION)    Diabetes ([address]     no meds required; controlled by diet and weight    GERD (gastroesophageal reflux disease)     Hypertension     Ill-defined condition     no venipuncture left arm--hx skin graft    Thyroid disease      Past Surgical History:   Procedure Laterality Date    ABDOMEN SURGERY PROC UNLISTED  7/5/2013    Feeding tube placement ( has been removed)    HX BREAST BIOPSY Left     early [de-identified]    HX GI  10/2016    esoph w/ dilatation    HX GI      gastric ulcer excision (13 stomach removed)    HX HEENT      gum surgery    HX HEENT Bilateral     TUBES IN EARS    HX HEENT      TRACHEOSTOMY    HX ORTHOPAEDIC      broken wrist    HX OTHER SURGICAL      left arm veins/tissue used for oral/facial reconstruction    HX PELON AND BSO      [place] EGD INSERT GUIDE WIRE DILATOR PASSAGE ESOPHAGUS  2017         UPPER GI ENDOSCOPY,BALL DIL,30MM  10/26/2016         UPPER GI ENDOSCOPY,DILATN W GUIDE  2017         UPPER GI ENDOSCOPY,DILATN W GUIDE  3/27/2017         UPPER GI ENDOSCOPY,DILATN W GUIDE  2017         UPPER GI ENDOSCOPY,DILATN W GUIDE  2017         UPPER GI ENDOSCOPY,DILATN W GUIDE  10/2/2017         UPPER GI ENDOSCOPY,DILATN W GUIDE  11/10/2017          Social History     Socioeconomic History    Marital status:      Spouse name: Not on file    Number of children: Not on file    Years of education: Not on file    Highest education level: Not on file   Social Needs    Financial resource strain: Not on file    Food insecurity - worry: Not on file    Food insecurity - inability: Not on file   [employer] needs - medical: Not on file   [employer] needs - non-medical: Not on file   Occupational History    Not on file   Tobacco Use    Smoking status: Former Smoker     Last attempt to quit: 1980     Years since quittin.3    Smokeless tobacco: Never Used   Substance and Sexual Activity    Alcohol use: No    Drug use: No    Sexual activity: Not on file   Other Topics Concern    Not on file   Social History Narrative    Not on file     OB History     Obstetric Comments    Menarche:  12. LMP: . # of Children:  2. Age at Delivery of First Child:  19.   Hysterectomy/oophorectomy:  YES/YES. Breast Bx:  yes. Hx of Breast Feeding:  no. BCP:  yes. Hormone therapy:  yes. Current Outpatient Medications:     OTHER, 10 mg. [facility] 10 mg, Disp: , Rfl:     atorvastatin (LIPITOR) 20 mg tablet, Take 20 mg by mouth nightly., Disp: , Rfl:     levothyroxine (SYNTHROID) 75 mcg tablet, Take 75 mcg by mouth Daily (before breakfast). , Disp: , Rfl:     MULTIVITAMIN WITH MINERALS (HAIR,SKIN AND NAILS PO), Take  by mouth two (2) times a day., Disp: , Rfl:     aspirin 81 mg chewable tablet, Take 81 mg by mouth daily. , Disp: , Rfl:     temazepam (RESTORIL) 15 mg capsule, Take  by mouth nightly as needed for Sleep., Disp: , Rfl:     peg 400-propylene glycol (SYSTANE, PROPYLENE GLYCOL,) 0.4-0.3 % drop, Apply 1 Drop to eye as needed. Both eyes, Disp: , Rfl:     amLODIPine (NORVASC) 10 mg tablet, Take 10 mg by mouth every morning., Disp: , Rfl:     acetaminophen (TYLENOL EXTRA STRENGTH) 500 mg tablet, Take  by mouth as needed for Pain., Disp: , Rfl:     conjugated estrogens (PREMARIN) 0.625 mg/gram vaginal cream, Insert 0.5 g into vagina two (2) times a week., Disp: , Rfl:   No Known Allergies    Review of Systems   Constitutional: Negative. HENT: Negative. Eyes: Negative. Respiratory: Negative. Cardiovascular: Negative. Gastrointestinal: Positive for heartburn. Genitourinary: Positive for frequency. Musculoskeletal: Negative. Skin: Negative. Neurological: Negative. Endo/Heme/Allergies: Negative. Psychiatric/Behavioral: Negative. All other systems reviewed and are negative. Physical Exam   Constitutional: She is oriented to person, place, and time. She appears well-developed and well-nourished. HENT:   Head: Normocephalic. Eyes: EOM are normal.   Neck: Neck supple. No thyromegaly present. Cardiovascular: Intact distal pulses. Pulmonary/Chest: Effort normal. Right breast exhibits no inverted nipple, no mass, no nipple discharge, no skin change and no tenderness.  Left breast exhibits no inverted nipple, no mass, no nipple discharge, no skin change and no tenderness. Breasts are symmetrical.   Abdominal: Soft. Musculoskeletal: Normal range of motion. Lymphadenopathy:     She has no cervical adenopathy. She has no axillary adenopathy. Neurological: She is alert and oriented to person, place, and time. Skin: Skin is warm and dry. Psychiatric: She has a normal mood and affect. Nursing note and vitals reviewed. ASSESSMENT and PLAN    ICD-10-CM ICD-9-CM    1. Breast calcifications on mammogram R92.1 793.89 ARIAN STEREO VAC  BX BREAST RT 1ST LESION W/CLIP AND SPECIMEN     67 yo female with abnormal calcifications right breast.   I have reviewed the imaging. Will set her up with stereotactic breast biopsy at her location preference which is [facility]. Will call her with results.

## 2019-02-07 ENCOUNTER — DOCUMENTATION ONLY (OUTPATIENT)
Dept: SURGERY | Age: 74
End: 2019-02-07

## 2019-02-07 NOTE — PROGRESS NOTES
Stereo scheduled for 2/11/19:    Appointments for this Order     2/11/2019 1100  - 90 min [facility]OBBY 1 (Resource) [facility] Zoned Nutrition

## 2019-02-11 ENCOUNTER — HOSPITAL ENCOUNTER (OUTPATIENT)
Dept: MAMMOGRAPHY | Age: 74
Discharge: HOME | End: 2019-02-11
Attending: SURGERY
Payer: MEDICARE

## 2019-02-11 VITALS
HEART RATE: 80 BPM | OXYGEN SATURATION: 99 % | RESPIRATION RATE: 16 BRPM | WEIGHT: 135 LBS | SYSTOLIC BLOOD PRESSURE: 145 MMHG | DIASTOLIC BLOOD PRESSURE: 74 MMHG | BODY MASS INDEX: 24.84 KG/M2 | HEIGHT: 62 IN

## 2019-02-11 DIAGNOSIS — R92.1 BREAST CALCIFICATIONS ON MAMMOGRAM: ICD-10-CM

## 2019-02-11 DIAGNOSIS — R92.8 ABNORMAL MAMMOGRAM OF RIGHT BREAST: ICD-10-CM

## 2019-02-11 PROCEDURE — 88360 TUMOR IMMUNOHISTOCHEM/MANUAL: CPT

## 2019-02-11 PROCEDURE — 19081 BX BREAST 1ST LESION STRTCTC: CPT

## 2019-02-11 PROCEDURE — 88342 IMHCHEM/IMCYTCHM 1ST ANTB: CPT

## 2019-02-11 PROCEDURE — A4648 IMPLANTABLE TISSUE MARKER: HCPCS

## 2019-02-11 PROCEDURE — 77030013689 HC GD NDL EVIVA HOLO -A

## 2019-02-11 PROCEDURE — 77030030538 HC HNDPC BIOP EVIVA HOLO -C

## 2019-02-11 PROCEDURE — 74011000250 HC RX REV CODE- 250: Performed by: RADIOLOGY

## 2019-02-11 PROCEDURE — 74011250636 HC RX REV CODE- 250/636: Performed by: RADIOLOGY

## 2019-02-11 PROCEDURE — 88305 TISSUE EXAM BY PATHOLOGIST: CPT

## 2019-02-11 RX ORDER — LIDOCAINE HYDROCHLORIDE AND EPINEPHRINE 10; 10 MG/ML; UG/ML
8 INJECTION, SOLUTION INFILTRATION; PERINEURAL ONCE
Status: COMPLETED | OUTPATIENT
Start: 2019-02-11 | End: 2019-02-11

## 2019-02-11 RX ORDER — LIDOCAINE HYDROCHLORIDE 10 MG/ML
12 INJECTION, SOLUTION EPIDURAL; INFILTRATION; INTRACAUDAL; PERINEURAL
Status: COMPLETED | OUTPATIENT
Start: 2019-02-11 | End: 2019-02-11

## 2019-02-11 RX ADMIN — LIDOCAINE HYDROCHLORIDE,EPINEPHRINE BITARTRATE 80 MG: 10; .01 INJECTION, SOLUTION INFILTRATION; PERINEURAL at 11:00

## 2019-02-11 RX ADMIN — SODIUM BICARBONATE 5 ML: 0.2 INJECTION, SOLUTION INTRAVENOUS at 11:00

## 2019-02-11 RX ADMIN — LIDOCAINE HYDROCHLORIDE 12 ML: 10 INJECTION, SOLUTION EPIDURAL; INFILTRATION; INTRACAUDAL; PERINEURAL at 11:00

## 2019-02-11 NOTE — DISCHARGE INSTRUCTIONS
Instructions Following Your Breast Biopsy         Pain Control    · Tylenol should be taken as directed on the back of the bottle (yvette 4-6 hours) for the next 24 hours post breast biopsy unless directed otherwise by a physician. · No Aspirin or Anti-Inflammatory  (Motrin, Advil ) medications for 24 hours. · Wearing a soft, comfortable (Wire free ) bra, even at night, for 24 hours is helpful. · Use a clean, covered ice pack over the site every 1- 2 hours til bedtime, for 20-30 minutes at a time for the first 24 hours. Biopsy Site     · A small amount of bleeding and /or oozing from the Biopsy site is normal, as well as bloody nipple discharge, which is rare. · You may notice bruising on the skin over the next few days. · Steri Strips and a dressing have been applied. · The steri strips can remain on for up to 5 days. · The clean dressing can be removed in 48 hours, however, if the dressing becomes loose, causes redness or irritation of the skin or any drainage has collected, please remove it an replace it with a Band-Aid. · Avoid getting the Biopsy site wet for 48 hours, avoid showering, swimming and hot tubs. · Should you have excessive bleeding or pain, please seek medical treatment or call                 [facility] [employer] [phone], 7:30 - 4:00 p.m. After hours (ask to speak to the on-call Radiology Nurse-RN)                        [phone] or [phone]      Activity      · Rest the day of the biopsy, avoiding strenuous activities and any heavy lifting. · You may resume most activities/ work the following day. Pathology Report     · The results of your Pathology report, from the laboratory, should be available in 3-5 business days. The Radiologist will review your results and, based on your preference, we will be happy to provide results to you by phone or in person.   · You will also be advised, at that time, of any further appointments or follow- up you may need.

## 2019-02-11 NOTE — ROUTINE PROCESS
Dr. Kisha Claudio present for pre procedure consult and consent - questions reviewed with understanding - consent signed.   Discharge instructions reviewed w/pt verbalizing understanding - will review again at discharge

## 2019-02-11 NOTE — ROUTINE PROCESS
Hand held direct pressure (w/ice intermittently) consistently for 40 minutes w/marking of hematoma edges. Dr Justin Davis evaluated right breast hematoma and cancelled post clip flim, pt cleared for discharge. Biopsy site clean and dry, hemostasis achieved. Steri strips with DSD placed. Ice pack held by patient. Discharge instructions reviewed with understanding, questions reviewed, copy given to patient. Pt verbalized she would like to be notified by phone of any results. Pt encouraged to call department if she has not received her results in 3-5 business days. Pt advised not answer cell phone call while driving. Specimen carried to gross room in lab by RN.

## 2019-02-13 ENCOUNTER — TELEPHONE (OUTPATIENT)
Dept: MAMMOGRAPHY | Age: 74
End: 2019-02-13

## 2019-02-13 NOTE — TELEPHONE ENCOUNTER
Dr. Laura Lane reviewed pathology report and notified patient of positive result. Dr. Yvonne Hernandez was faxed copy of the pathology report - left message on nurse line of path. Set up an appointment w/Dr. Esteban Monsalve to review pathology results for Wednesday, 2/20/19 @ 830am, arriving at 820am.  Ms. Ena Soto is familiar with office as she was there prior to biopsy appointment. Ms. Ena Soto appears to be doing well with diagnosis and advised biopsy site is healing well.

## 2019-02-27 ENCOUNTER — OFFICE VISIT (OUTPATIENT)
Dept: SURGERY | Age: 74
End: 2019-02-27

## 2019-02-27 VITALS
DIASTOLIC BLOOD PRESSURE: 69 MMHG | WEIGHT: 135 LBS | BODY MASS INDEX: 24.84 KG/M2 | HEART RATE: 71 BPM | HEIGHT: 62 IN | SYSTOLIC BLOOD PRESSURE: 164 MMHG

## 2019-02-27 DIAGNOSIS — D05.11 DUCTAL CARCINOMA IN SITU (DCIS) OF RIGHT BREAST: Primary | ICD-10-CM

## 2019-02-27 NOTE — PATIENT INSTRUCTIONS

## 2019-02-27 NOTE — COMMUNICATION BODY
HISTORY OF PRESENT ILLNESS  Kaylee Bundy is a 66 y.o. female. HPI   ESTABLISHED patient here today for newly diagnosed RIGHT breast cancer. She had RIGHT breast stereotactic biopsy on 2/11/19. She has bruise to area. Denies any other new breast problems at this time. FINAL PATHOLOGIC DIAGNOSIS on 2/11/19   Right breast, calcifications at 12 o'clock, core biopsy:   Ductal carcinoma in situ, solid type, nuclear grade 2, with central necrosis and calcifications   No invasive carcinoma is identified   Estrogen receptor positive. History of \"mammary gland bleeding\" from LEFT breast about 40 years ago. Benign, per patient. The patient has a personal history of gum cancer, treated with surgery and radiation. FH includes-   Maternal cousin had breast cancer (unsure what age). Father had colon cancer. Recent imaging-   Bilateral screening mammogram on 1/21/19- BIRADS 0    RIGHT diagnostic mammogram on 1/28/19-   UNILATERAL DIAGNOSTIC DIGITAL MAMMOGRAPHY of the right breast with CAD is  performed to evaluate calcifications seen on screening mammogram. Dedicated   imaging today of the area of mammographic concern shows suspicious grouped  calcifications in the 12:00 location anterior to mid depth which stereotactic  biopsy is recommended. This has been discussed with the patient.     Breast Composition: Scattered fibroglandular densities. IMPRESSION:  1. BI-RADS Assessment Category 4: Suspicious abnormality -right calcifications. 2. Stereotactic biopsy recommended. 3. Patient has been provided a surgical consultation appointment with Dr. Annette Tidwell February 6. Review of Systems   All other systems reviewed and are negative. Physical Exam   Pulmonary/Chest:       Right breast palpable hematoma   Nursing note and vitals reviewed. BREAST ULTRASOUND, Preop planning  Indication:preop planning  right Breast 12:00    Technique:   The area was scanned using a high-frequency linear-array near-field transducer  Findings: 12:00 large hematoma with 2 clips in inferior part of cavity  Impression: Biopsy site visible with ultrasound  Disposition:  Will schedule lumpectomy     ASSESSMENT and PLAN    ICD-10-CM ICD-9-CM    1. Ductal carcinoma in situ (DCIS) of right breast D05.11 233.0      65 yo female with right breast DCIS, grade 2 Stage 0 Er 100%  She is here with her   I have reviewed the imaging and pathology with her and she was given copies of these reports. 90 minutes were spent face-to-face with the patient during this encounter and 90% of that time was spent on counseling and coordination of care. 1. Discussed lumpectomy and radiation vs mastectomy. Discussed reconstruction. Patient refused mri   2. Discussed sentinel lymph node biopsy. Not needed for dcis. 3. Discussed external beam radiation. 4. Discussed hormone therapy. 5. Discussed the possibility of chemotherapy. Not needed for dcis      - will schedule for right breast us guided lumpectomy  The risks and procedure were discussed. Risks include bleeding, bruising, scar, infection, need for more surgery   She understood and wished to proceed.

## 2019-02-27 NOTE — PROGRESS NOTES
HISTORY OF PRESENT ILLNESS  Kaylee Matamoros is a 66 y.o. female. HPI   ESTABLISHED patient here today for newly diagnosed RIGHT breast cancer. She had RIGHT breast stereotactic biopsy on 2/11/19. She has bruise to area. Denies any other new breast problems at this time. FINAL PATHOLOGIC DIAGNOSIS on 2/11/19   Right breast, calcifications at 12 o'clock, core biopsy:   Ductal carcinoma in situ, solid type, nuclear grade 2, with central necrosis and calcifications   No invasive carcinoma is identified   Estrogen receptor positive. History of \"mammary gland bleeding\" from LEFT breast about 40 years ago. Benign, per patient. The patient has a personal history of gum cancer, treated with surgery and radiation. FH includes-   Maternal cousin had breast cancer (unsure what age). Father had colon cancer. Recent imaging-   Bilateral screening mammogram on 1/21/19- BIRADS 0    RIGHT diagnostic mammogram on 1/28/19-   UNILATERAL DIAGNOSTIC DIGITAL MAMMOGRAPHY of the right breast with CAD is  performed to evaluate calcifications seen on screening mammogram. Dedicated   imaging today of the area of mammographic concern shows suspicious grouped  calcifications in the 12:00 location anterior to mid depth which stereotactic  biopsy is recommended. This has been discussed with the patient.     Breast Composition: Scattered fibroglandular densities. IMPRESSION:  1. BI-RADS Assessment Category 4: Suspicious abnormality -right calcifications. 2. Stereotactic biopsy recommended. 3. Patient has been provided a surgical consultation appointment with Dr. Anisha Linares February 6. Review of Systems   All other systems reviewed and are negative. Physical Exam   Pulmonary/Chest:       Right breast palpable hematoma   Nursing note and vitals reviewed. BREAST ULTRASOUND, Preop planning  Indication:preop planning  right Breast 12:00    Technique:   The area was scanned using a high-frequency linear-array near-field transducer  Findings: 12:00 large hematoma with 2 clips in inferior part of cavity  Impression: Biopsy site visible with ultrasound  Disposition:  Will schedule lumpectomy     ASSESSMENT and PLAN    ICD-10-CM ICD-9-CM    1. Ductal carcinoma in situ (DCIS) of right breast D05.11 233.0      65 yo female with right breast DCIS, grade 2 Stage 0 Er 100%  She is here with her   I have reviewed the imaging and pathology with her and she was given copies of these reports. 90 minutes were spent face-to-face with the patient during this encounter and 90% of that time was spent on counseling and coordination of care. 1. Discussed lumpectomy and radiation vs mastectomy. Discussed reconstruction. Patient refused mri   2. Discussed sentinel lymph node biopsy. Not needed for dcis. 3. Discussed external beam radiation. 4. Discussed hormone therapy. 5. Discussed the possibility of chemotherapy. Not needed for dcis      - will schedule for right breast us guided lumpectomy  The risks and procedure were discussed. Risks include bleeding, bruising, scar, infection, need for more surgery   She understood and wished to proceed.

## 2019-03-13 DIAGNOSIS — D05.11 DUCTAL CARCINOMA IN SITU OF RIGHT BREAST: Primary | ICD-10-CM

## 2019-03-22 RX ORDER — TAMSULOSIN HYDROCHLORIDE 0.4 MG/1
1 CAPSULE ORAL DAILY
COMMUNITY
Start: 2019-01-22 | End: 2019-10-17 | Stop reason: ALTCHOICE

## 2019-03-22 RX ORDER — AMLODIPINE BESYLATE 5 MG/1
1 TABLET ORAL DAILY
COMMUNITY
Start: 2019-02-07 | End: 2019-10-17 | Stop reason: ALTCHOICE

## 2019-03-22 NOTE — PERIOP NOTES
[facility] Ambulatory Surgery Unit Pre-operative Instructions Surgery/Procedure Date  3/28            Tentative Arrival Time 09:30 am 
 
 
1. On the day of your surgery/procedure, please report to the Ambulatory Surgery Unit Registration Desk and sign in at your designated time. The Ambulatory Surgery Unit is located in [facility] on the [facility] side of the [facility] across from the [address] [place] [address]. Please have all of your health insurance cards and a photo ID. 2. You must have someone with you to drive you home, as you should not drive a car for 24 hours following anesthesia. Please make arrangements for a responsible adult friend or family member to stay with you for at least the first 24 hours after your surgery. 3. Do not have anything to eat or drink (including water, gum, mints, coffee, juice) after 11:59 PM  3/2. This may not apply to medications prescribed by your physician. (Please note below the special instructions with medications to take the morning of surgery, if applicable.) 4. We recommend you do not drink any alcoholic beverages for 24 hours before and after your surgery. 5. Contact your surgeons office for instructions on the following medications: non-steroidal anti-inflammatory drugs (i.e. Advil, Aleve), vitamins, and supplements. (Some surgeons will want you to stop these medications prior to surgery and others may allow you to take them) **If you are currently taking Plavix, Coumadin, Aspirin and/or other blood-thinning agents, contact your surgeon for instructions. ** Your surgeon will partner with the physician prescribing these medications to determine if it is safe to stop or if you need to continue taking. Please do not stop taking these medications without instructions from your surgeon.  
 
6. In an effort to help prevent surgical site infection, we ask that you shower with an anti-bacterial soap (i.e. Dial/Safeguard, or the soap provided to you at your preadmission testing appointment) for 3 days prior to and on the morning of surgery, using a fresh towel after each shower. (Please begin this process with fresh bed linens.) Do not apply any lotions, powders, or deodorants after the shower on the day of your procedure. If applicable, please do not shave the operative site for 48 hours prior to surgery. 7. Wear comfortable clothes. Wear glasses instead of contacts. Do not bring any jewelry or money (other than copays or fees as instructed). Do not wear make-up, particularly mascara, the morning of your surgery. Do not wear nail polish, particularly if you are having foot /hand surgery. Wear your hair loose or down, no ponytails, buns, jessica pins or clips. All body piercings must be removed. 8. You should understand that if you do not follow these instructions your surgery may be cancelled. If your physical condition changes (i.e. fever, cold or flu) please contact your surgeon as soon as possible. 9. It is important that you be on time. If a situation occurs where you may be late, or if you have any questions or problems, please call [phone]. 
 
10. Your surgery time may be subject to change. You will receive a phone call the day prior to surgery to confirm your arrival time. Special Instructions: Take all medications and inhalers, as prescribed, on the morning of surgery with a sip of water EXCEPT: none I understand a pre-operative phone call will be made to verify my surgery time. In the event that I am not available, I give permission for a message to be left on my answering service and/or with another person? yes 
 
 
 
 ___________________      ___________________      ________________ 
(Signature of Patient)          (Witness)                   (Date and Time)

## 2019-03-25 ENCOUNTER — ANESTHESIA EVENT (OUTPATIENT)
Dept: SURGERY | Age: 74
End: 2019-03-25
Payer: MEDICARE

## 2019-03-26 ENCOUNTER — HOSPITAL ENCOUNTER (OUTPATIENT)
Dept: SURGERY | Age: 74
Setting detail: OUTPATIENT SURGERY
Discharge: HOME | End: 2019-03-26

## 2019-03-26 VITALS
RESPIRATION RATE: 16 BRPM | HEART RATE: 85 BPM | OXYGEN SATURATION: 100 % | TEMPERATURE: 98 F | DIASTOLIC BLOOD PRESSURE: 61 MMHG | SYSTOLIC BLOOD PRESSURE: 153 MMHG

## 2019-03-26 NOTE — ANESTHESIA PREPROCEDURE EVALUATION
Anesthetic History No history of anesthetic complications Review of Systems / Medical History Patient summary reviewed, nursing notes reviewed and pertinent labs reviewed Pulmonary Smoker Comments: Former Smoker Neuro/Psych Within defined limits Cardiovascular Hypertension: well controlled Hyperlipidemia Exercise tolerance: >4 METS 
  
GI/Hepatic/Renal 
  
GERD: well controlled PUD Comments: ESOPHAGEAL STRICTURE 
DYSPHAGIA Endo/Other Diabetes: well controlled, type 2 Hypothyroidism: well controlled Cancer Other Findings Comments: Hx gum cancer Gastric cancer Breast cancer Physical Exam 
 
Airway Mallampati: III Neck ROM: normal range of motion Mouth opening: Diminished (comment) Comments: Trachea appears midline Dysphagia resolved Cardiovascular Regular rate and rhythm,  S1 and S2 normal,  no murmur, click, rub, or gallop Rhythm: regular Rate: normal 
 
 
 
 Dental 
 
Dentition: Full upper dentures and Implants Comments: Lower implants Pulmonary Breath sounds clear to auscultation Abdominal 
GI exam deferred Other Findings Anesthetic Plan ASA: 2 Anesthesia type: general 
 
Monitoring Plan: BIS Induction: Intravenous Anesthetic plan and risks discussed with: Patient

## 2019-03-28 ENCOUNTER — ANESTHESIA (OUTPATIENT)
Dept: SURGERY | Age: 74
End: 2019-03-28
Payer: MEDICARE

## 2019-03-28 ENCOUNTER — HOSPITAL ENCOUNTER (OUTPATIENT)
Age: 74
Setting detail: OUTPATIENT SURGERY
Discharge: HOME | End: 2019-03-28
Attending: SURGERY | Admitting: SURGERY
Payer: MEDICARE

## 2019-03-28 VITALS
WEIGHT: 139.8 LBS | SYSTOLIC BLOOD PRESSURE: 116 MMHG | TEMPERATURE: 97.6 F | RESPIRATION RATE: 20 BRPM | OXYGEN SATURATION: 98 % | HEIGHT: 62 IN | DIASTOLIC BLOOD PRESSURE: 41 MMHG | BODY MASS INDEX: 25.73 KG/M2 | HEART RATE: 90 BPM

## 2019-03-28 DIAGNOSIS — D05.11 DUCTAL CARCINOMA IN SITU OF RIGHT BREAST: ICD-10-CM

## 2019-03-28 PROCEDURE — 77030010509 HC AIRWY LMA MSK TELE -A: Performed by: NURSE ANESTHETIST, CERTIFIED REGISTERED

## 2019-03-28 PROCEDURE — 77030002933 HC SUT MCRYL J&J -A: Performed by: SURGERY

## 2019-03-28 PROCEDURE — 88342 IMHCHEM/IMCYTCHM 1ST ANTB: CPT

## 2019-03-28 PROCEDURE — 77030011267 HC ELECTRD BLD COVD -A: Performed by: SURGERY

## 2019-03-28 PROCEDURE — 74011250636 HC RX REV CODE- 250/636: Performed by: ANESTHESIOLOGY

## 2019-03-28 PROCEDURE — 74011000250 HC RX REV CODE- 250: Performed by: SURGERY

## 2019-03-28 PROCEDURE — 76060000061 HC AMB SURG ANES 0.5 TO 1 HR: Performed by: SURGERY

## 2019-03-28 PROCEDURE — 88305 TISSUE EXAM BY PATHOLOGIST: CPT

## 2019-03-28 PROCEDURE — 77030011640 HC PAD GRND REM COVD -A: Performed by: SURGERY

## 2019-03-28 PROCEDURE — 74011000250 HC RX REV CODE- 250

## 2019-03-28 PROCEDURE — 77030021352 HC CBL LD SYS DISP COVD -B: Performed by: SURGERY

## 2019-03-28 PROCEDURE — 77030011825 HC SUPP SURG PSTOP S2SG -B: Performed by: SURGERY

## 2019-03-28 PROCEDURE — 76210000034 HC AMBSU PH I REC 0.5 TO 1 HR: Performed by: SURGERY

## 2019-03-28 PROCEDURE — 76030000000 HC AMB SURG OR TIME 0.5 TO 1: Performed by: SURGERY

## 2019-03-28 PROCEDURE — 88307 TISSUE EXAM BY PATHOLOGIST: CPT

## 2019-03-28 PROCEDURE — 76210000050 HC AMBSU PH II REC 0.5 TO 1 HR: Performed by: SURGERY

## 2019-03-28 PROCEDURE — 77030031139 HC SUT VCRL2 J&J -A: Performed by: SURGERY

## 2019-03-28 PROCEDURE — 77030018836 HC SOL IRR NACL ICUM -A: Performed by: SURGERY

## 2019-03-28 PROCEDURE — 74011250636 HC RX REV CODE- 250/636

## 2019-03-28 PROCEDURE — 77030020255 HC SOL INJ LR 1000ML BG: Performed by: SURGERY

## 2019-03-28 PROCEDURE — 77030034556 HC PRB MARGN DETECT LUMPECTMY DISP DUNE -G: Performed by: SURGERY

## 2019-03-28 RX ORDER — LIDOCAINE HYDROCHLORIDE 10 MG/ML
0.1 INJECTION, SOLUTION EPIDURAL; INFILTRATION; INTRACAUDAL; PERINEURAL AS NEEDED
Status: DISCONTINUED | OUTPATIENT
Start: 2019-03-28 | End: 2019-03-28 | Stop reason: HOSPADM

## 2019-03-28 RX ORDER — EPHEDRINE SULFATE 50 MG/ML
INJECTION, SOLUTION INTRAVENOUS AS NEEDED
Status: DISCONTINUED | OUTPATIENT
Start: 2019-03-28 | End: 2019-03-28 | Stop reason: HOSPADM

## 2019-03-28 RX ORDER — CHOLECALCIFEROL (VITAMIN D3) 125 MCG
CAPSULE ORAL
COMMUNITY
End: 2019-03-28

## 2019-03-28 RX ORDER — SODIUM CHLORIDE 0.9 % (FLUSH) 0.9 %
5-40 SYRINGE (ML) INJECTION EVERY 8 HOURS
Status: DISCONTINUED | OUTPATIENT
Start: 2019-03-28 | End: 2019-03-28 | Stop reason: HOSPADM

## 2019-03-28 RX ORDER — ONDANSETRON 2 MG/ML
4 INJECTION INTRAMUSCULAR; INTRAVENOUS AS NEEDED
Status: DISCONTINUED | OUTPATIENT
Start: 2019-03-28 | End: 2019-03-28 | Stop reason: HOSPADM

## 2019-03-28 RX ORDER — FENTANYL CITRATE 50 UG/ML
25 INJECTION, SOLUTION INTRAMUSCULAR; INTRAVENOUS
Status: DISCONTINUED | OUTPATIENT
Start: 2019-03-28 | End: 2019-03-28 | Stop reason: HOSPADM

## 2019-03-28 RX ORDER — MORPHINE SULFATE 10 MG/ML
2 INJECTION, SOLUTION INTRAMUSCULAR; INTRAVENOUS
Status: DISCONTINUED | OUTPATIENT
Start: 2019-03-28 | End: 2019-03-28 | Stop reason: HOSPADM

## 2019-03-28 RX ORDER — DIPHENHYDRAMINE HYDROCHLORIDE 50 MG/ML
12.5 INJECTION, SOLUTION INTRAMUSCULAR; INTRAVENOUS
Status: DISCONTINUED | OUTPATIENT
Start: 2019-03-28 | End: 2019-03-28 | Stop reason: HOSPADM

## 2019-03-28 RX ORDER — SODIUM CHLORIDE, SODIUM LACTATE, POTASSIUM CHLORIDE, CALCIUM CHLORIDE 600; 310; 30; 20 MG/100ML; MG/100ML; MG/100ML; MG/100ML
25 INJECTION, SOLUTION INTRAVENOUS CONTINUOUS
Status: DISCONTINUED | OUTPATIENT
Start: 2019-03-28 | End: 2019-03-28 | Stop reason: HOSPADM

## 2019-03-28 RX ORDER — LIDOCAINE HYDROCHLORIDE AND EPINEPHRINE 10; 10 MG/ML; UG/ML
20 INJECTION, SOLUTION INFILTRATION; PERINEURAL ONCE
Status: COMPLETED | OUTPATIENT
Start: 2019-03-28 | End: 2019-03-28

## 2019-03-28 RX ORDER — OXYCODONE AND ACETAMINOPHEN 5; 325 MG/1; MG/1
1 TABLET ORAL
Qty: 30 TAB | Refills: 0 | Status: SHIPPED | OUTPATIENT
Start: 2019-03-28 | End: 2019-03-31

## 2019-03-28 RX ORDER — BUPIVACAINE HYDROCHLORIDE 2.5 MG/ML
20 INJECTION, SOLUTION EPIDURAL; INFILTRATION; INTRACAUDAL ONCE
Status: COMPLETED | OUTPATIENT
Start: 2019-03-28 | End: 2019-03-28

## 2019-03-28 RX ORDER — ONDANSETRON 4 MG/1
4 TABLET, ORALLY DISINTEGRATING ORAL
Qty: 5 TAB | Refills: 1 | Status: SHIPPED | OUTPATIENT
Start: 2019-03-28 | End: 2019-04-17 | Stop reason: ALTCHOICE

## 2019-03-28 RX ORDER — GLYCOPYRROLATE 0.2 MG/ML
INJECTION INTRAMUSCULAR; INTRAVENOUS AS NEEDED
Status: DISCONTINUED | OUTPATIENT
Start: 2019-03-28 | End: 2019-03-28 | Stop reason: HOSPADM

## 2019-03-28 RX ORDER — SODIUM CHLORIDE 0.9 % (FLUSH) 0.9 %
5-40 SYRINGE (ML) INJECTION AS NEEDED
Status: DISCONTINUED | OUTPATIENT
Start: 2019-03-28 | End: 2019-03-28 | Stop reason: HOSPADM

## 2019-03-28 RX ORDER — SODIUM CHLORIDE, SODIUM LACTATE, POTASSIUM CHLORIDE, CALCIUM CHLORIDE 600; 310; 30; 20 MG/100ML; MG/100ML; MG/100ML; MG/100ML
25 INJECTION, SOLUTION INTRAVENOUS CONTINUOUS
Status: DISCONTINUED | OUTPATIENT
Start: 2019-03-28 | End: 2019-03-28 | Stop reason: SDUPTHER

## 2019-03-28 RX ORDER — FENTANYL CITRATE 50 UG/ML
INJECTION, SOLUTION INTRAMUSCULAR; INTRAVENOUS AS NEEDED
Status: DISCONTINUED | OUTPATIENT
Start: 2019-03-28 | End: 2019-03-28 | Stop reason: HOSPADM

## 2019-03-28 RX ADMIN — SODIUM CHLORIDE, SODIUM LACTATE, POTASSIUM CHLORIDE, AND CALCIUM CHLORIDE 25 ML/HR: 600; 310; 30; 20 INJECTION, SOLUTION INTRAVENOUS at 10:16

## 2019-03-28 RX ADMIN — GLYCOPYRROLATE 0.2 MG: 0.2 INJECTION INTRAMUSCULAR; INTRAVENOUS at 11:13

## 2019-03-28 RX ADMIN — FENTANYL CITRATE 50 MCG: 50 INJECTION, SOLUTION INTRAMUSCULAR; INTRAVENOUS at 11:09

## 2019-03-28 RX ADMIN — EPHEDRINE SULFATE 10 MG: 50 INJECTION, SOLUTION INTRAVENOUS at 11:10

## 2019-03-28 RX ADMIN — EPHEDRINE SULFATE 10 MG: 50 INJECTION, SOLUTION INTRAVENOUS at 11:12

## 2019-03-28 RX ADMIN — FENTANYL CITRATE 50 MCG: 50 INJECTION, SOLUTION INTRAMUSCULAR; INTRAVENOUS at 11:06

## 2019-03-28 RX ADMIN — SODIUM CHLORIDE, POTASSIUM CHLORIDE, SODIUM LACTATE AND CALCIUM CHLORIDE: 600; 310; 30; 20 INJECTION, SOLUTION INTRAVENOUS at 10:56

## 2019-03-28 NOTE — PERIOP NOTES
Permission received to review discharge instructions and discuss private health information with Phong Gutierrez ().

## 2019-03-28 NOTE — BRIEF OP NOTE
BRIEF OPERATIVE NOTE Date of Procedure: 3/28/2019 Preoperative Diagnosis: RIGHT BREAST DUCTAL CARCINOMA Postoperative Diagnosis: RIGHT BREAST DUCTAL CARCINOMA Procedure(s): RIGHT BREAST LUMPECTOMY WITH ULTRASOUND Tissue rearrangement 10 cm 2 
intraop margin assessment using rf spec Surgeon(s) and Role: Josette Cavanaugh MD - Primary Surgical Assistant:  
 
Surgical Staff: 
Circ-1: Gabriel Muniz RN Scrub Tech-1: Saman Bell Scrub Tech-2: Kyle Chu 
Surg Asst-1: Swapna Vega Event Time In Time Out Incision Start 1059 Incision Close 1125 Anesthesia: MAC Estimated Blood Loss: minimal 
Specimens:  
ID Type Source Tests Collected by Time Destination 1 : RIGHT BREAST LUMPECTOMY Preservative Breast  Santana Castañeda MD 3/28/2019 1105 Pathology 2 : RIGHT BREAST LATERAL MARGIN Preservative Breast  Santana Castañeda MD 3/28/2019 9663 Pathology 3 : RIGHT BREAST INFERIOR MARGIN Preservative Breast  Santana Castañeda MD 3/28/2019 1112 Pathology 4 : RIGHT BREAST DEEP MARGIN Preservative Breast  Santana Castañeda MD 3/28/2019 1114 Pathology Findings: clip and cavity excised Complications: none Implants: * No implants in log * Dictated [id number]

## 2019-03-28 NOTE — PERIOP NOTES
Patient: Parish Wright MRN: [id number]  SSN: [id number] YOB: 1945  Age: 66 y.o. Sex: female Patient is status post Procedure(s): RIGHT BREAST LUMPECTOMY WITH ULTRASOUND. Surgeon(s) and Role: Patsy Lewis MD - Primary Local/Dose/Irrigation:  SEE MAR Peripheral IV 03/28/19 Right Hand (Active) Site Assessment Clean, dry, & intact 3/28/2019 10:15 AM  
Phlebitis Assessment 0 3/28/2019 10:15 AM  
Infiltration Assessment 0 3/28/2019 10:15 AM  
Dressing Status Clean, dry, & intact 3/28/2019 10:15 AM  
Dressing Type Tape;Transparent 3/28/2019 10:15 AM  
Hub Color/Line Status Blue; Infusing 3/28/2019 10:15 AM  
        
Airway - Endotracheal Tube 03/28/19 Oral (Active) Dressing/Packing:   RIGHT BREAST-4X4S, EXOFIN, &BRA Other:  MARGIN PROBE USED.

## 2019-03-28 NOTE — PERIOP NOTES
Andrew Jessica 1945 
[id number] Situation: 
Verbal report given from: EFRAIN Wilson Procedure: Procedure(s): RIGHT BREAST LUMPECTOMY WITH ULTRASOUND Background: 
 
Preoperative diagnosis: RIGHT BREAST DUCTAL CARCINOMA Postoperative diagnosis: RIGHT BREAST DUCTAL CARCINOMA :  Dr. Bj Del Rosario Assistant(s): Circ-1: Brenda Faust RN Scrub Tech-1: Manjit Martinez Scrub Tech-2: Afia Serene 
Surg Asst-1: Chico Skinner Specimens:  
ID Type Source Tests Collected by Time Destination 1 : RIGHT BREAST LUMPECTOMY Preservative Breast  Martene Mohs, MD 3/28/2019 1105 Pathology 2 : RIGHT BREAST LATERAL MARGIN Preservative Breast  Martene Mohs, MD 3/28/2019 2144 Pathology 3 : RIGHT BREAST INFERIOR MARGIN Preservative Breast  Martene Mohs, MD 3/28/2019 1112 Pathology 4 : RIGHT BREAST DEEP MARGIN Preservative Breast  Martene Mohs, MD 3/28/2019 1114 Pathology Assessment: 
Intra-procedure medications Anesthesia gave intra-procedure sedation and medications, see anesthesia flow sheet Intravenous fluids: Loel Mor Vital signs stable Recommendation: 
 
Permission to share finding with  : yes

## 2019-03-28 NOTE — PERIOP NOTES
1137-Pt received to pacu. Pt remains very sleepy, opens eyes briefly to voice. Rt breast incision well approx, padded with gauze and surgical bra on. Ice pack applied over gown. 1150-Pt remains sleepy. VSS. 
1200-Pt waking more. Pt c/o sl dizziness. 1205-Dizziness resolving. HOB elevated and pt sipping juice. Dentures back in. Pt states it is okay to review discharge instructions and pertinent health information with .  at bedside. Discharge instructions reviewed. Prescriptions reviewed. They voice no questions or concerns. 1230-Pt is awake and alert. Denies complaints. VSS. Patient meets discharge criteria and agrees she is ready to go home,   also agrees. PIV removed. Dressed pt. 
1242-Pt discharged home in stable condition via w/c to car. Instructed to get up with assist today.

## 2019-03-28 NOTE — ANESTHESIA POSTPROCEDURE EVALUATION
Procedure(s): RIGHT BREAST LUMPECTOMY WITH ULTRASOUND. general 
 
Anesthesia Post Evaluation Patient location during evaluation: PACU Note status: Adequate. Level of consciousness: responsive to verbal stimuli and sleepy but conscious Pain management: satisfactory to patient Airway patency: patent Anesthetic complications: no 
Cardiovascular status: acceptable Respiratory status: acceptable Hydration status: acceptable Comments: +Post-Anesthesia Evaluation and Assessment Patient: Yo Morris MRN: [id number]  SSN: [id number] YOB: 1945  Age: 66 y.o. Sex: female Cardiovascular Function/Vital Signs /43   Pulse 96   Temp 36.4 °C (97.6 °F)   Resp 18   Ht 5' 2\" (1.575 m)   Wt 63.4 kg (139 lb 12.8 oz)   SpO2 99%   BMI 25.57 kg/m² Patient is status post Procedure(s): RIGHT BREAST LUMPECTOMY WITH ULTRASOUND. Nausea/Vomiting: Controlled. Postoperative hydration reviewed and adequate. Pain: 
Pain Scale 1: Numeric (0 - 10) (03/28/19 1207) Pain Intensity 1: 0 (03/28/19 1207) Managed. Neurological Status:  
Neuro (WDL): Exceptions to WDL (03/28/19 1137) At baseline. Mental Status and Level of Consciousness: Arousable. Pulmonary Status:  
O2 Device: Room air (03/28/19 1207) Adequate oxygenation and airway patent. Complications related to anesthesia: None Post-anesthesia assessment completed. No concerns. Signed By: Viki Burdick MD  
 3/28/2019 Post anesthesia nausea and vomiting:  controlled Vitals Value Taken Time /43 3/28/2019 12:07 PM  
Temp 36.4 °C (97.6 °F) 3/28/2019 12:03 PM  
Pulse 96 3/28/2019 12:07 PM  
Resp 18 3/28/2019 12:07 PM  
SpO2 99 % 3/28/2019 12:07 PM

## 2019-03-28 NOTE — DISCHARGE INSTRUCTIONS
Discharge Instructions from Dr. Chuckie Martinez    · I will call you with the pathology results, typically within 1 week from today. · You may shower, but no hot tubs, swimming pools, or baths until your incision is healed. · No heavy lifting with the affected extremity (nothing greater than 5 pounds), and limit its use for the next 4-5 days. · You may use an ice pack for comfort for the next couple of days, but do not place ice directly on the skin. Rather, use a towel or clothing to serve as a barrier between skin and ice to prevent injury. · Follow medication instructions carefully. No aspirin, ibuprofen or aleve. · May take tylenol instead of narcotic. · Wear surgical bra and dressing for 24 hours, then remove. Wear supportive bra at all times. · You will have bruising and swelling  · Watch for signs of infection as listed below. · Redness  · Swelling  · Drainage from the incision or from your nipple that appears infected  · Fever over 101.5 degrees for consecutive readings, or over 99.5 if you are currently undergoing chemotherapy. · Call our office (number is below) for a follow-up appointment. · If you have any problems, our phone number is [phone]        TO PREVENT AN INFECTION      1. [address] YOUR HANDS     To prevent infection, good handwashing is the most important thing you or your caregiver can do.  Wash your hands with soap and water or use the hand  we gave you before you touch any wounds. 2. SHOWER     Use the antibacterial soap we gave you when you take a shower.  Shower with this soap until your wounds are healed.  To reach all areas of your body, you may need someone to help you.  Dont forget to clean your belly button with every shower. 3.  USE CLEAN SHEETS     Use freshly cleaned sheets on your bed after surgery.  To keep the surgery site clean, do not allow pets to sleep with you while your wound is still healing.      4. STOP SMOKING     Stop smoking, or at least cut back on smoking     Smoking slows your healing. 5.  CONTROL YOUR BLOOD SUGAR     High blood sugars slow wound healing.  If you are diabetic, control your blood sugar levels before and after your surgery. DO NOT TAKE TYLENOL/ACETAMINOPHEN WITH PERCOCET, [facility], [address] TAKE NARCOTIC PAIN MEDICATIONS WITH FOOD     Narcotics tend to be constipating, we suggest taking a stool softener such as Colace or Miralax (follow package instructions). DO NOT DRIVE WHILE TAKING NARCOTIC PAIN MEDICATIONS. DO NOT TAKE SLEEPING MEDICATIONS OR ANTIANXIETY MEDICATIONS WHILE TAKING NARCOTIC PAIN MEDICATIONS,  ESPECIALLY THE NIGHT OF ANESTHESIA! CPAP PATIENTS BE SURE TO WEAR MACHINE WHENEVER NAPPING OR SLEEPING! DISCHARGE SUMMARY from Nurse    The following personal items collected during your admission are returned to you:   Dental Appliance: Dental Appliances: Uppers(in labeled cup in PACU)  Vision: Visual Aid: Glasses, At home  Hearing Aid:    Jewelry: Jewelry: Ring(2 wedding bands in labeled bag in jacket pocket in belongings bag)  Clothing: Clothing: Other (comment)(clohting in belongings bag)  Other Valuables: Other Valuables: None  Valuables sent to safe:        PATIENT INSTRUCTIONS:    After General Anesthesia or Intravenous Sedation, for 24 hours or while taking prescription Narcotics:        Someone should be with you for the next 24 hours. For your own safety, a responsible adult must drive you home. · Limit your activities  · Recommended activity: Rest today, up with assistance today. Do not climb stairs or shower unattended for the next 24 hours. · Please start with a soft bland diet and advance as tolerated (no nausea) to regular diet. · If you have a sore throat you should try the following: fluids, warm salt water gargles, or throat lozenges. If it does not improve after several days please follow up with your primary physician.   · Do not drive and operate hazardous machinery  · Do not make important personal or business decisions  · Do  not drink alcoholic beverages  · If you have not urinated within 8 hours after discharge, please contact your surgeon on call. Report the following to your surgeon:  · Excessive pain, swelling, redness or odor of or around the surgical area  · Temperature over 100.5  · Nausea and vomiting lasting longer than 4 hours or if unable to take medications  · Any signs of decreased circulation or nerve impairment to extremity: change in color, persistent  numbness, tingling, coldness or increase pain      · You will receive a Post Operative Call from one of the Recovery Room Nurses on the day after your surgery to check on you. It is very important for us to know how you are recovering after your surgery. If you have an issue or need to speak with someone, please call your surgeon, do not wait for the post operative call. · You may receive an e-mail or letter in the mail from [facility] regarding your experience with us in the Ambulatory Surgery Unit. Your feedback is valuable to us and we appreciate your participation in the survey. · If the above instructions are not adequate or you are having problems after your surgery, call the physician at their office number. · We wish you a speedy recovery ? What to do at Home:      *  Please give a list of your current medications to your Primary Care Provider. *  Please update this list whenever your medications are discontinued, doses are      changed, or new medications (including over-the-counter products) are added. *  Please carry medication information at all times in case of emergency situations. These are general instructions for a healthy lifestyle:    No smoking/ No tobacco products/ Avoid exposure to second hand smoke    Surgeon General's Warning:  Quitting smoking now greatly reduces serious risk to your health.     Obesity, smoking, and sedentary lifestyle greatly increases your risk for illness    A healthy diet, regular physical exercise & weight monitoring are important for maintaining a healthy lifestyle    You may be retaining fluid if you have a history of heart failure or if you experience any of the following symptoms:  Weight gain of 3 pounds or more overnight or 5 pounds in a week, increased swelling in our hands or feet or shortness of breath while lying flat in bed. Please call your doctor as soon as you notice any of these symptoms; do not wait until your next office visit. Recognize signs and symptoms of STROKE:    B - Balance  E - Eyes    F-  Face looks uneven  A-  Arms unable to move or move even  S-  Speech slurred or non-existent  T-  Time-call 911 as soon as signs and symptoms begin-DO NOT go       Back to bed or wait to see if you get better-TIME IS BRAIN. If you have not received your influenza and/or pneumococcal vaccine, please follow up with your primary care physician. The discharge information has been reviewed with the patient and caregiver. The patient and caregiver verbalized understanding.

## 2019-03-28 NOTE — OP NOTES
[address]  OPERATIVE REPORT    Name:  Corry Escalera  MR#:  [id number]  :  1945  ACCOUNT #:  [de-identified]  DATE OF SERVICE:  2019    PREOPERATIVE DIAGNOSIS:  Right breast ductal carcinoma in situ. POSTOPERATIVE DIAGNOSIS:  Right breast ductal carcinoma in situ. PROCEDURE PERFORMED:  Right breast ultrasound-guided lumpectomy, tissue arrangement for 10 sq cm, intraoperative margin assessment using radio-frequency spectroscopy. SURGEON:  Bridger Wheeler MD    ASSISTANT:  Julio Chan. Bri Baptiste MD    ANESTHESIA:  MAC.    COMPLICATIONS:  None. SPECIMENS REMOVED:  1. Right breast lumpectomy. 2.  Right breast lateral margin. 3.  Right breast superior margin. 4.  Right breast deep margin. IMPLANTS:  None. ESTIMATED BLOOD LOSS:  Minimal.    FINDINGS:  Include the cavity excised. INDICATION FOR PROCEDURE:  This is a 68-year-old female with right breast DCIS. She wished to have lumpectomy, ultrasound-guided. PROCEDURE IN DETAIL:  The patient was seen in the preoperative holding area. Surgical site was marked by surgeon. Informed consent was obtained. She was taken to the operating room and laid in the supine position, where LMA anesthesia was induced. Right breast was prepped and draped in the usual fashion and a time-out was performed. Attention was turned to the right breast at 12 o'clock. There was still a hematoma and two clips in the breast, but a lot smaller than before. This was identified with ultrasound guidance. An incision was made over the hematoma. Bovie cautery was used to dissect down and around the lesion all the way to the chest wall. This was excised and marked with short stitch superior and long stitch lateral.  The MarginProbe device was plugged in and calibrated. All 6 margins were assessed. For additional margins, please see above. Circumferentially, the 10 sq cm of the tissue was undermined using a Bovie cautery.   A 40 mL of local anesthetic was injected into the breast and skin. The deeper tissues were closed with interrupted 2-0 Vicryl, the skin with interrupted 3-0 Vicryl and 4-0 subcuticular Monocryl. Skin glue was applied to the incision. All sponge, needle and instrument counts were correct. The patient went to recovery in stable condition.       Todd Hwang MD      [id number]  D:  03/28/2019 11:33  T:  03/28/2019 11:59  JOB #:  [id number]

## 2019-03-29 ENCOUNTER — TELEPHONE (OUTPATIENT)
Dept: SURGERY | Age: 74
End: 2019-03-29

## 2019-03-29 NOTE — TELEPHONE ENCOUNTER
Called patient POD#1 to check on her after breast surgery. Doing well. Denies pain. Does not have any questions or concerns. Reminded her of her post op appointment on 4/17/19 at 11:00 at [facility]. She was appreciative.

## 2019-04-08 ENCOUNTER — HOSPITAL ENCOUNTER (OUTPATIENT)
Dept: CT IMAGING | Age: 74
Discharge: HOME | End: 2019-04-08
Attending: NURSE PRACTITIONER
Payer: MEDICARE

## 2019-04-08 DIAGNOSIS — R31.29 HEMATURIA, MICROSCOPIC: ICD-10-CM

## 2019-04-08 LAB — CREAT BLD-MCNC: 1 MG/DL (ref 0.6–1.3)

## 2019-04-08 PROCEDURE — 74011636320 HC RX REV CODE- 636/320: Performed by: NURSE PRACTITIONER

## 2019-04-08 PROCEDURE — 74178 CT ABD&PLV WO CNTR FLWD CNTR: CPT

## 2019-04-08 PROCEDURE — 82565 ASSAY OF CREATININE: CPT

## 2019-04-08 RX ORDER — SODIUM CHLORIDE 0.9 % (FLUSH) 0.9 %
10 SYRINGE (ML) INJECTION
Status: DISCONTINUED | OUTPATIENT
Start: 2019-04-08 | End: 2019-04-08

## 2019-04-08 RX ORDER — SODIUM CHLORIDE 0.9 % (FLUSH) 0.9 %
10 SYRINGE (ML) INJECTION
Status: COMPLETED | OUTPATIENT
Start: 2019-04-08 | End: 2019-04-08

## 2019-04-08 RX ADMIN — IOPAMIDOL 100 ML: 755 INJECTION, SOLUTION INTRAVENOUS at 12:39

## 2019-04-08 RX ADMIN — Medication 10 ML: at 12:39

## 2019-04-17 ENCOUNTER — OFFICE VISIT (OUTPATIENT)
Dept: SURGERY | Age: 74
End: 2019-04-17

## 2019-04-17 VITALS
HEIGHT: 62 IN | DIASTOLIC BLOOD PRESSURE: 75 MMHG | HEART RATE: 77 BPM | SYSTOLIC BLOOD PRESSURE: 175 MMHG | BODY MASS INDEX: 25.58 KG/M2 | WEIGHT: 139 LBS

## 2019-04-17 DIAGNOSIS — D05.01 NEOPLASM OF RIGHT BREAST, PRIMARY TUMOR STAGING CATEGORY TIS: LOBULAR CARCINOMA IN SITU (LCIS): ICD-10-CM

## 2019-04-17 DIAGNOSIS — D05.01 BREAST NEOPLASM, TIS (LCIS), RIGHT: Primary | ICD-10-CM

## 2019-04-17 NOTE — PROGRESS NOTES
HISTORY OF PRESENT ILLNESS  Kaylee Jaramillo is a 66 y.o. female. HPI ESTABLISHED patient here for post op visit s/p RIGHT breast lumpectomy on 3/28/19. Overall doing well. No pain. History of \"mammary gland bleeding\" from LEFT breast about 40 years ago. Benign, per patient. The patient has a personal history of gum cancer, treated with surgery and radiation. Breast history-  2/11/19 - RIGHT breast biopsy - 65 yo female with right breast DCIS, grade 2 Stage 0 Er 100%  3/28/19 - RIGHT breast lumpectomy. LCIS. No DCIS.      Family history-   Maternal cousin had breast cancer (unsure what age). Father had colon cancer.     Breast imaging-  Mammogram 1/2019    FINAL PATHOLOGIC DIAGNOSIS   1. Right breast, lumpectomy without wire-guided localization:   Lobular carcinoma in situ with atypical features including distention of involved spaces, calcifications and comedonecrosis (LCIS involves 8 of 8 slides)   Closest margins:   Superior: 1 mm   Inferior: <1 mm   Lateral: <1 mm   Biopsy site   See comment   2. Right breast lateral margin, excision:   Lobular carcinoma in situ with atypical features including distention of involved spaces, calcifications and comedonecrosis (LCIS involves 2 of 6 slides)   New lateral margin: <1 mm   3. Right breast inferior margin, excision:   Lobular carcinoma in situ with cautery artifact   New inferior margin: 1 mm   4. Right breast deep margin, excision:   Negative for LCIS   Incidental micro-papilloma and florid ductal hyperplasia     Review of Systems   All other systems reviewed and are negative. Physical Exam   Pulmonary/Chest:       Right breast incision c/d/i. Nursing note and vitals reviewed. ASSESSMENT and PLAN    ICD-10-CM ICD-9-CM    1. Breast neoplasm, Tis (LCIS), right D05.01 233.0      - presented at tumor board. No radiation  Refer to med onc for endocrine therapy.

## 2019-04-17 NOTE — PATIENT INSTRUCTIONS
Lumpectomy: What to Expect at [address] [place] [address]    For 1 or 2 days after the surgery you will probably feel tired and have some pain. The skin around the cut (incision) may feel firm, swollen, and tender, and be bruised. Tenderness should go away in about 2 or 3 days, and the bruising within 2 weeks. Firmness and swelling may last for 3 to 6 months. You may feel a soft lump in your breast that gradually turns hard. This is the incision healing. It is not cancer. Women should wear a well-fitted and supportive bra, even during the night, for 1 week. You will probably be able to go back to work or your normal routine in 1 to 3 weeks after the surgery. This may depend on whether you have more treatment. Your doctor may have removed some lymph nodes in your armpit to see if the cancer has spread. If so, you may feel either numbness or tingling (\"pins and needles\") in your armpit or on the inside of your upper arm. This should improve over the next several weeks. Some people have numbness for a longer time. When you find out that you have cancer, you may feel many emotions and may need some help coping. Seek out family, friends, and counselors for support. You also can do things at home to make yourself feel better while you go through treatment. Call the [facility] ([phone]) or visit its website at [address] [employer] for more information. This care sheet gives you a general idea about how long it will take for you to recover. But each person recovers at a different pace. Follow the steps below to get better as quickly as possible. How can you care for yourself at home? Activity    · Rest when you feel tired. Getting enough sleep will help you recover. You may want to sleep on the side that has not been operated on.  A woman may want to use a pillow to support the affected breast while lying on her side.     · Avoid strenuous activities, such as biking, jogging, weightlifting, or aerobic exercise, for 1 month or until your doctor says it is okay. This may include housework, such as washing windows, especially if you have to use the arm next to the affected breast.     · Most people can return to their normal activities within 2 weeks.     · Try to walk each day. Start out by walking a little more than you did the day before. Bit by bit, increase the amount you walk. Walking boosts blood flow and helps prevent pneumonia and constipation.     · For 1 to 2 weeks, avoid lifting anything over 10 to 15 pounds or that would make you strain. This may include heavy grocery bags and milk containers, a heavy briefcase or backpack, cat litter or dog food bags, a vacuum , or a child.     · You may drive when you are no longer taking pain medicine and can use your arm without pain. Talk to your doctor about when to start driving, especially if you are having radiation treatments.     · You will probably be able to go back to work or your normal routine in 1 to 3 weeks. It may be longer, depending on the type of work you do and whether you are having radiation or chemotherapy.     · You may shower 24 to 48 hours after surgery, if your doctor okays it. Pat the incision dry. Do not take a bath for the first 2 weeks, or until your doctor tells you it is okay. Diet    · You can eat your normal diet. If your stomach is upset, try bland, low-fat foods like plain rice, broiled chicken, toast, and yogurt.     · You may notice that your bowel movements are not regular right after your surgery. This is common. Try to avoid constipation and straining with bowel movements. You may want to take a fiber supplement every day. If you have not had a bowel movement after a couple of days, ask your doctor about taking a mild laxative. Medicines    · Your doctor will tell you if and when you can restart your medicines.  He or she will also give you instructions about taking any new medicines.     · If you take blood thinners, such as warfarin (Coumadin), clopidogrel (Plavix), or aspirin, be sure to talk to your doctor. He or she will tell you if and when to start taking those medicines again. Make sure that you understand exactly what your doctor wants you to do.     · Take pain medicines exactly as directed. ? Your doctor may have given you a medicine to numb the area inside and around your cut (incision). The numbness will last from 6 to 12 hours. If you went home right after the surgery, you may want to take pain medicine before this wears off.  ? If the doctor gave you a prescription medicine for pain, take it as prescribed. ? If you are not taking a prescription pain medicine, ask your doctor if you can take an over-the-counter medicine.     · If your doctor prescribed antibiotics, take them as directed. Do not stop taking them just because you feel better. You need to take the full course of antibiotics.     · If you think your pain medicine is making you sick to your stomach:  ? Take your medicine after meals (unless your doctor has told you not to). ? Ask your doctor for a different pain medicine. Incision care    · If you have strips of tape on the cut the doctor made (incision), leave the tape on for a week or until it falls off.     · When you can shower, wash the area daily with warm, soapy water and pat it dry. Follow-up care is a key part of your treatment and safety. Be sure to make and go to all appointments, and call your doctor if you are having problems. It's also a good idea to know your test results and keep a list of the medicines you take. When should you call for help? Call 911 anytime you think you may need emergency care.  For example, call if:    · You passed out (lost consciousness).     · You have chest pain, are short of breath, or cough up blood.    Call your doctor now or seek immediate medical care if:    · You are sick to your stomach or cannot drink fluids.     · You cannot pass stools or gas.     · You have pain that does not get better after you take your pain medicine.     · You have loose stitches, or your incision comes open.     · Bright red blood has soaked through the bandage over your incision.     · You have signs of a blood clot in your leg (called a deep vein thrombosis), such as:  ? Pain in your calf, back of the knee, thigh, or groin. ? Redness or swelling in your leg.     · You have signs of infection, such as:  ? Increased pain, swelling, warmth, or redness. ? Red streaks leading from the incision. ? Pus draining from the incision. ? A fever.    Watch closely for changes in your health, and be sure to contact your doctor if:    · You have any problems.     · You have new or worse swelling or pain in your arm. Where can you learn more? Go to http://isabel-luisa.info/. Enter D222 in the search box to learn more about \"Lumpectomy: What to Expect at Home. \"  Current as of: March 27, 2018  Content Version: 11.9  © 2790-1238 [employer]. Care instructions adapted under license by [employer] (which disclaims liability or warranty for this information). If you have questions about a medical condition or this instruction, always ask your healthcare professional. [address] any warranty or liability for your use of this information.

## 2019-04-18 PROBLEM — D05.01 NEOPLASM OF RIGHT BREAST, PRIMARY TUMOR STAGING CATEGORY TIS: LOBULAR CARCINOMA IN SITU (LCIS): Status: ACTIVE | Noted: 2019-04-18

## 2019-04-22 ENCOUNTER — DOCUMENTATION ONLY (OUTPATIENT)
Dept: SURGERY | Age: 74
End: 2019-04-22

## 2019-04-22 NOTE — PROGRESS NOTES
Patient has appt scheduled to see Dr. Spencer Tobar at [facility] in [facility] [address] at 3:00opm arrive at 2:30pm on April 24. Spoke to the patient and gave her appt information

## 2019-04-24 ENCOUNTER — OFFICE VISIT (OUTPATIENT)
Dept: ONCOLOGY | Age: 74
End: 2019-04-24

## 2019-04-24 VITALS
WEIGHT: 139 LBS | SYSTOLIC BLOOD PRESSURE: 158 MMHG | RESPIRATION RATE: 18 BRPM | OXYGEN SATURATION: 96 % | TEMPERATURE: 98.5 F | HEART RATE: 65 BPM | BODY MASS INDEX: 25.58 KG/M2 | HEIGHT: 62 IN | DIASTOLIC BLOOD PRESSURE: 75 MMHG

## 2019-04-24 DIAGNOSIS — D05.01 NEOPLASM OF RIGHT BREAST, PRIMARY TUMOR STAGING CATEGORY TIS: LOBULAR CARCINOMA IN SITU (LCIS): Primary | ICD-10-CM

## 2019-04-24 RX ORDER — CYANOCOBALAMIN 1000 UG/ML
1000 INJECTION, SOLUTION INTRAMUSCULAR; SUBCUTANEOUS ONCE
COMMUNITY

## 2019-04-24 RX ORDER — LETROZOLE 2.5 MG/1
2.5 TABLET, FILM COATED ORAL DAILY
Qty: 30 TAB | Refills: 11 | Status: SHIPPED | OUTPATIENT
Start: 2019-04-24 | End: 2020-05-01

## 2019-04-24 NOTE — PROGRESS NOTES
Identified pt with two pt identifiers(name and ). Reviewed record in preparation for visit and have obtained necessary documentation. Chief Complaint   Patient presents with   [address] [facility] Patient    Breast Cancer      Visit Vitals  /75 (BP 1 Location: Right arm, BP Patient Position: Sitting)   Pulse 65   Temp 98.5 °F (36.9 °C) (Oral)   Resp 18   Ht 5' 2\" (1.575 m)   Wt 139 lb (63 kg)   SpO2 96%   BMI 25.42 kg/m²       Health Maintenance Due   Topic    DTaP/Tdap/Td series (1 - Tdap)    Shingrix Vaccine Age 50> (1 of 2)    GLAUCOMA SCREENING Q2Y     Pneumococcal 65+ years (1 of 2 - PCV13)    MEDICARE YEARLY EXAM        Coordination of Care Questionnaire:  :   1) Have you been to an emergency room, urgent care, or hospitalized since your last visit? If yes, where when, and reason for visit? no       2. Have seen or consulted any other health care provider since your last visit? If yes, where when, and reason for visit? NO      3) Do you have an Advanced Directive/ Living Will in place? NO  If yes, do we have a copy on file NO  If no, would you like information NO    Patient is accompanied by self I have received verbal consent from Siria Douglas to discuss any/all medical information while they are present in the room.

## 2019-06-24 NOTE — PROGRESS NOTES
Oncology Consultation Note        Patient: Ryne Tobias MRN: [id number]  SSN: [id number]    YOB: 1945  Age: 66 y.o. Sex: female      Subjective:      Ryne Tobias is a 66 y.o. female who I am seeing in consultation for right breast LCIS. She underwent a screening mammogram and was noted to have suspicious calcification at 12 O'clock in the right breast. A biopsy of the area revealed DCIS. She then underwent a lumpectomy on 03/28/2019. The final pathology was read at [facility], ER +ve. She is doing well and comes in to discuss the role of adjuvant hormonal therapy.          Review of Systems:    Constitutional: negative  Eyes: negative  Ears, Nose, Mouth, Throat, and Face: negative  Respiratory: negative  Cardiovascular: negative  Gastrointestinal: negative  Genitourinary:negative  Integument/Breast: negative  Hematologic/Lymphatic: negative  Musculoskeletal:negative  Neurological: negative        Past Medical History:   Diagnosis Date    [facility]     Diet-controlled type 2 diabetes mellitus ([facility]     GERD (gastroesophageal reflux disease)     Gum cancer ([facility] 2013    surgery / radiation    Hypertension     Hypothyroid     Ill-defined condition     no venipuncture left arm--hx skin graft    Overactive bladder     PUD (peptic ulcer disease)      Past Surgical History:   Procedure Laterality Date    ABDOMEN SURGERY PROC UNLISTED  7/5/2013    Feeding tube placement ( has been removed)    HX BREAST BIOPSY Left     early 80's    HX BREAST LUMPECTOMY Right 3/28/2019    RIGHT BREAST LUMPECTOMY WITH ULTRASOUND performed by Madeleine Mcdonough [facility] at [facility] OR    HX GI  1980's    gastric ulcer excision (1/3 stomach removed)    HX HEENT  2013    gum surgery    HX ORTHOPAEDIC      broken wrist    HX OTHER SURGICAL  2013    left arm veins/tissue used for oral/facial reconstruction    HX PELON AND BSO      HX TRACHEOSTOMY  2013    removed    HX TYMPANOSTOMY Bilateral 2015 tubes    ME EGD INSERT GUIDE WIRE DILATOR PASSAGE ESOPHAGUS  2017         UPPER GI ENDOSCOPY,BALL DIL,30MM  10/26/2016         UPPER GI ENDOSCOPY,DILATN W GUIDE  2017         UPPER GI ENDOSCOPY,DILATN W GUIDE  3/27/2017         UPPER GI ENDOSCOPY,DILATN W GUIDE  2017         UPPER GI ENDOSCOPY,DILATN W GUIDE  2017         UPPER GI ENDOSCOPY,DILATN W GUIDE  10/2/2017         UPPER GI ENDOSCOPY,DILATN W GUIDE  11/10/2017           Family History   Problem Relation Age of Onset    Cancer Father         colon    Hypertension Father     Stroke Father     Kidney Disease Mother     Breast Cancer Other     Anesth Problems Neg Hx      Social History     Tobacco Use    Smoking status: Former Smoker     Last attempt to quit: 1980     Years since quittin.7    Smokeless tobacco: Never Used   Substance Use Topics    Alcohol use: No      Prior to Admission medications    Medication Sig Start Date End Date Taking? Authorizing Provider   cyanocobalamin (VITAMIN B12) 1,000 mcg/mL injection 1,000 mcg by IntraMUSCular route once. Yes Provider, Historical   letrozole (FEMARA) 2.5 mg tablet Take 1 Tab by mouth daily. 19  Yes Erika Haines, NP   vit a,c & e-lutein-minerals (OCUVITE) tablet Take 1 Tab by mouth daily. Yes Provider, Historical   CANNABIDIOL, CBD, EXTRACT PO Take 8 Drops by mouth two (2) times a day. Yes Provider, Historical   amLODIPine (NORVASC) 5 mg tablet Take 1 Tab by mouth daily. 19  Yes Provider, Historical   tamsulosin (FLOMAX) 0.4 mg capsule Take 1 Cap by mouth daily. 19  Yes Provider, Historical   peg 400-propylene glycol (SYSTANE, PROPYLENE GLYCOL,) 0.4-0.3 % drop Apply 1 Drop to eye as needed. Both eyes   Yes Provider, Historical   atorvastatin (LIPITOR) 20 mg tablet Take 20 mg by mouth nightly. Yes Provider, Historical   levothyroxine (SYNTHROID) 75 mcg tablet Take 75 mcg by mouth Daily (before breakfast).    Yes Provider, Historical MULTIVITAMIN WITH MINERALS (HAIR,SKIN AND NAILS PO) Take 1 Tab by mouth daily. Yes Provider, Historical   temazepam (RESTORIL) 15 mg capsule Take 15 mg by mouth nightly as needed for Sleep. Yes Provider, Historical   acetaminophen (TYLENOL EXTRA STRENGTH) 500 mg tablet Take 1,000 mg by mouth every six (6) hours as needed for Pain. Provider, Historical              No Known Allergies        Objective:     Vitals:    04/24/19 1457   BP: 158/75   Pulse: 65   Resp: 18   Temp: 98.5 °F (36.9 °C)   TempSrc: Oral   SpO2: 96%   Weight: 139 lb (63 kg)   Height: 5' 2\" (1.575 m)            Physical Exam:    GENERAL: alert, cooperative, no distress, appears stated age  EYE: conjunctivae/corneas clear. PERRL, EOM's intact. Fundi benign  LYMPHATIC: Cervical, supraclavicular, and axillary nodes normal.   THROAT & NECK: normal and no erythema or exudates noted. LUNG: clear to auscultation bilaterally  HEART: regular rate and rhythm, S1, S2 normal, no murmur, click, rub or gallop  ABDOMEN: soft, non-tender. Bowel sounds normal. No masses,  no organomegaly  EXTREMITIES:  extremities normal, atraumatic, no cyanosis or edema  SKIN: Normal.  NEUROLOGIC: AOx3. Gait normal. Reflexes and motor strength normal and symmetric. Cranial nerves 2-12 and sensation grossly intact. Assessment:     1. Right breast LCIS    Nuclear grade 2  %    ECOG PS 0  Intent of Treatment - curative  Prognosis - excellent    AI reduce the risk of recurrent invasive and non-invasive breast carcinoma. I spent 65 minutes with the patient in a face-to-face encounter. I explained her the stage of the disease, pathophysiology of the disease and the treatment approaches. I answered all her questions. More than 50% of the time was utilized in education, counseling and co-ordination of care. The patient's emotional well being was addressed during this office visit and patient seems to be coping well with the diagnosis and the treatment. I counseled the patient regarding the hormonal therapy. Discussions included side-effect and benefit of hormonal therapy. She understood the expected side-effect which includes hot flashes, myalgias/arthralgias, osteopenia/osteoporosis with aromatase inhibitor. She agrees to take lEtrozole. She understands the alternative to this is observation alone. Plan:       1. Start Letrozole   3. Return in 3 months      Signed By: Nishant Walters MD     June 23, 2019           CC. Cordell Hirsch MD  CC.  Nay Ram MD

## 2019-07-26 ENCOUNTER — OFFICE VISIT (OUTPATIENT)
Dept: ONCOLOGY | Age: 74
End: 2019-07-26

## 2019-07-26 VITALS
WEIGHT: 137.4 LBS | RESPIRATION RATE: 18 BRPM | TEMPERATURE: 97.7 F | DIASTOLIC BLOOD PRESSURE: 79 MMHG | OXYGEN SATURATION: 98 % | HEART RATE: 64 BPM | SYSTOLIC BLOOD PRESSURE: 182 MMHG | BODY MASS INDEX: 25.28 KG/M2 | HEIGHT: 62 IN

## 2019-07-26 DIAGNOSIS — Z78.0 POST-MENOPAUSAL: ICD-10-CM

## 2019-07-26 DIAGNOSIS — D05.01 NEOPLASM OF RIGHT BREAST, PRIMARY TUMOR STAGING CATEGORY TIS: LOBULAR CARCINOMA IN SITU (LCIS): Primary | ICD-10-CM

## 2019-07-26 DIAGNOSIS — Z79.811 AROMATASE INHIBITOR USE: ICD-10-CM

## 2019-07-26 NOTE — PROGRESS NOTES
Anand Purcell is a 66 y.o. female here today for right breast cancer f/u. Patient taking Letrozole. VS stable. Patient denies pain. Good appetite. Patient denies N/V/D and constipation. Patient denies numbness and tingling. Patient denies mouth ulcers. Patient denies cough. Patient denies SOB. Patient denies falls. Visit Vitals  /79 (BP 1 Location: Right arm, BP Patient Position: Sitting)   Pulse 64   Temp 97.7 °F (36.5 °C) (Oral)   Resp 18   Ht 5' 2\" (1.575 m)   Wt 137 lb 6.4 oz (62.3 kg)   SpO2 98%   BMI 25.13 kg/m²       Pain Scale: 0 - No pain/10  Pain Location:     1. Have you been to the ER, urgent care clinic since your last visit? Hospitalized since your last visit? No    2. Have you seen or consulted any other health care providers outside of the [address] [place] [address] since your last visit? Include any pap smears or colon screening. No     Health Maintenance Review: Patient reminded of \"due or due soon\" health maintenance. I have asked the patient to contact his/her primary care provider (PCP) for follow-up on his/her health maintenance.

## 2019-07-26 NOTE — PROGRESS NOTES
[facility]  [address], [address] [facility] Jasvir Barrios, [address] [facility]  [phone]        Oncology Progress Note        Patient: Sydni Mina MRN: [id number]  SSN: [id number]    YOB: 1945  Age: 66 y.o. Sex: female        Diagnosis:     1. Right breast LCIS    Nuclear grade 2  %    Treatment:     1. Letrozole 2.5 mg daily  2. S/p lumpectomy 3/28/2019 - Dr. Arden Christian    Subjective:      Sydni Mina is a 66 y.o. female who I see for right breast LCIS. She underwent a screening mammogram and was noted to have suspicious calcification at 12 O'clock in the right breast. A biopsy of the area revealed DCIS. She then underwent a lumpectomy on 03/28/2019. The final pathology was read at [facility], ER +ve. She is doing well on letrozole. She works out at [employer] 4-5 times a week.        Review of Systems:    Constitutional: negative  Eyes: negative  Ears, Nose, Mouth, Throat, and Face: negative  Respiratory: negative  Cardiovascular: negative  Gastrointestinal: negative  Genitourinary:negative  Integument/Breast: negative  Hematologic/Lymphatic: negative  Musculoskeletal:negative  Neurological: negative        Past Medical History:   Diagnosis Date    [facility])     Diet-controlled type 2 diabetes mellitus ([facility])     GERD (gastroesophageal reflux disease)     Gum cancer ([facility]) 2013    surgery / radiation    Hypertension     Hypothyroid     Ill-defined condition     no venipuncture left arm--hx skin graft    Overactive bladder     PUD (peptic ulcer disease)      Past Surgical History:   Procedure Laterality Date    ABDOMEN SURGERY PROC UNLISTED  7/5/2013    Feeding tube placement ( has been removed)    HX BREAST BIOPSY Left     early 80's    HX BREAST LUMPECTOMY Right 3/28/2019    RIGHT BREAST LUMPECTOMY WITH ULTRASOUND performed by Arben Judd MD at [facility] AM[facility] OR    HX GI  1980's    gastric ulcer excision (1/3 stomach removed)    HX HEENT      gum surgery    HX ORTHOPAEDIC      broken wrist    HX OTHER SURGICAL      left arm veins/tissue used for oral/facial reconstruction    HX PELON AND BSO      HX TRACHEOSTOMY      removed    HX TYMPANOSTOMY Bilateral 2015    tubes    [facility] EGD INSERT GUIDE WIRE DILATOR PASSAGE ESOPHAGUS  2017         UPPER GI ENDOSCOPY,BALL LILIA,30MM  10/26/2016         UPPER GI ENDOSCOPY,DILATN W GUIDE  2017         UPPER GI ENDOSCOPY,DILATN W GUIDE  3/27/2017         UPPER GI ENDOSCOPY,DILATN W GUIDE  2017         UPPER GI ENDOSCOPY,DILATN W GUIDE  2017         UPPER GI ENDOSCOPY,DILATN W GUIDE  10/2/2017         UPPER GI ENDOSCOPY,DILATN W GUIDE  11/10/2017           Family History   Problem Relation Age of Onset    Cancer Father         colon    Hypertension Father     Stroke Father     Kidney Disease Mother     Breast Cancer Other     Anesth Problems Neg Hx      Social History     Tobacco Use    Smoking status: Former Smoker     Last attempt to quit: 1980     Years since quittin.8    Smokeless tobacco: Never Used   Substance Use Topics    Alcohol use: No      Prior to Admission medications    Medication Sig Start Date End Date Taking? Authorizing Provider   cyanocobalamin (VITAMIN B12) 1,000 mcg/mL injection 1,000 mcg by IntraMUSCular route once. Yes Provider, Historical   letrozole (FEMARA) 2.5 mg tablet Take 1 Tab by mouth daily. 19  Yes Salome Haines NP   vit a,c & e-lutein-minerals (OCUVITE) tablet Take 1 Tab by mouth daily. Yes Provider, Historical   CANNABIDIOL, CBD, EXTRACT PO Take 8 Drops by mouth two (2) times a day. Yes Provider, Historical   amLODIPine (NORVASC) 5 mg tablet Take 1 Tab by mouth daily. 19  Yes Provider, Historical   tamsulosin (FLOMAX) 0.4 mg capsule Take 1 Cap by mouth daily.  19  Yes Provider, Historical   peg 400-propylene glycol (SYSTANE, PROPYLENE GLYCOL,) 0.4-0.3 % drop Apply 1 Drop to eye as needed. Both eyes   Yes Provider, Historical   atorvastatin (LIPITOR) 20 mg tablet Take 20 mg by mouth nightly. Yes Provider, Historical   levothyroxine (SYNTHROID) 75 mcg tablet Take 75 mcg by mouth Daily (before breakfast). Yes Provider, Historical   MULTIVITAMIN WITH MINERALS (HAIR,SKIN AND NAILS PO) Take 1 Tab by mouth daily. Yes Provider, Historical   temazepam (RESTORIL) 15 mg capsule Take 15 mg by mouth nightly as needed for Sleep. Yes Provider, Historical   acetaminophen (TYLENOL EXTRA STRENGTH) 500 mg tablet Take 1,000 mg by mouth every six (6) hours as needed for Pain. Provider, Historical        No Known Allergies        Objective:     Vitals:    07/26/19 1202   BP: 182/79   Pulse: 64   Resp: 18   Temp: 97.7 °F (36.5 °C)   TempSrc: Oral   SpO2: 98%   Weight: 137 lb 6.4 oz (62.3 kg)   Height: 5' 2\" (1.575 m)        Physical Exam:    GENERAL: alert, cooperative, no distress, appears stated age  EYE: conjunctivae/corneas clear. PERRL, EOM's intact  LYMPHATIC: Cervical, supraclavicular, and axillary nodes normal.   THROAT & NECK: normal and no erythema or exudates noted. LUNG: clear to auscultation bilaterally  HEART: regular rate and rhythm, S1, S2 normal, no murmur, click, rub or gallop  ABDOMEN: soft, non-tender. Bowel sounds normal. No masses,  no organomegaly  BREAST: deferred at this visit. Breast NP visit in October  EXTREMITIES:  extremities normal, atraumatic, no cyanosis or edema  SKIN: Normal.  NEUROLOGIC: AOx3. Gait normal. Reflexes and motor strength normal and symmetric. Cranial nerves 2-12 and sensation grossly intact. Assessment:     1. Right breast LCIS    Nuclear grade 2  %    ECOG PS 0  Intent of Treatment - curative  Prognosis - excellent    AI reduce the risk of recurrent invasive and non-invasive breast carcinoma. osteopenia/osteoporosis with aromatase inhibitor. She agrees to take lEtrozole.  She understands the alternative to this is observation alone. Started letrozole 2.5 mg 4/2019  Tolerating treatment well  Asymptomatic  In remission    Dexa scan ordered - she had oral cancer. If dexa is normal would not recommend Zometa use. She goes to the gym 4-5 times a week. Plan:       1. Continue Letrozole 2.5 mg daily  2. Dexa scan ordered   3. Return in 6 months      Signed by: Sujata Carcamo MD                     July 27, 2019          CC. Mitch Cuellar MD  CC.  Roxana Tabor MD

## 2019-09-30 ENCOUNTER — APPOINTMENT (OUTPATIENT)
Dept: GENERAL RADIOLOGY | Age: 74
End: 2019-09-30
Attending: EMERGENCY MEDICINE
Payer: MEDICARE

## 2019-09-30 ENCOUNTER — HOSPITAL ENCOUNTER (EMERGENCY)
Age: 74
Discharge: HOME | End: 2019-09-30
Attending: EMERGENCY MEDICINE
Payer: MEDICARE

## 2019-09-30 ENCOUNTER — APPOINTMENT (OUTPATIENT)
Dept: CT IMAGING | Age: 74
End: 2019-09-30
Attending: STUDENT IN AN ORGANIZED HEALTH CARE EDUCATION/TRAINING PROGRAM
Payer: MEDICARE

## 2019-09-30 VITALS
DIASTOLIC BLOOD PRESSURE: 68 MMHG | BODY MASS INDEX: 24.84 KG/M2 | RESPIRATION RATE: 18 BRPM | SYSTOLIC BLOOD PRESSURE: 158 MMHG | HEART RATE: 70 BPM | WEIGHT: 135 LBS | OXYGEN SATURATION: 97 % | HEIGHT: 62 IN | TEMPERATURE: 97.6 F

## 2019-09-30 DIAGNOSIS — N30.00 ACUTE CYSTITIS WITHOUT HEMATURIA: Primary | ICD-10-CM

## 2019-09-30 LAB
ALBUMIN SERPL-MCNC: 3.8 G/DL (ref 3.5–5)
ALBUMIN/GLOB SERPL: 1.2 {RATIO} (ref 1.1–2.2)
ALP SERPL-CCNC: 62 U/L (ref 45–117)
ALT SERPL-CCNC: 16 U/L (ref 12–78)
ANION GAP SERPL CALC-SCNC: 5 MMOL/L (ref 5–15)
APPEARANCE UR: CLEAR
AST SERPL-CCNC: 14 U/L (ref 15–37)
ATRIAL RATE: 64 BPM
BACTERIA URNS QL MICRO: NEGATIVE /HPF
BASOPHILS # BLD: 0.1 K/UL (ref 0–0.1)
BASOPHILS NFR BLD: 1 % (ref 0–1)
BILIRUB SERPL-MCNC: 0.8 MG/DL (ref 0.2–1)
BILIRUB UR QL CFM: ABNORMAL
BUN SERPL-MCNC: 21 MG/DL (ref 6–20)
BUN/CREAT SERPL: 16 (ref 12–20)
CALCIUM SERPL-MCNC: 8.8 MG/DL (ref 8.5–10.1)
CALCULATED R AXIS, ECG10: -17 DEGREES
CALCULATED T AXIS, ECG11: 3 DEGREES
CHLORIDE SERPL-SCNC: 109 MMOL/L (ref 97–108)
CO2 SERPL-SCNC: 26 MMOL/L (ref 21–32)
COLOR UR: ABNORMAL
CREAT SERPL-MCNC: 1.3 MG/DL (ref 0.55–1.02)
DIAGNOSIS, 93000: NORMAL
DIFFERENTIAL METHOD BLD: ABNORMAL
EOSINOPHIL # BLD: 0.2 K/UL (ref 0–0.4)
EOSINOPHIL NFR BLD: 3 % (ref 0–7)
EPITH CASTS URNS QL MICRO: ABNORMAL /LPF
ERYTHROCYTE [DISTWIDTH] IN BLOOD BY AUTOMATED COUNT: 15.1 % (ref 11.5–14.5)
GLOBULIN SER CALC-MCNC: 3.2 G/DL (ref 2–4)
GLUCOSE SERPL-MCNC: 165 MG/DL (ref 65–100)
GLUCOSE UR STRIP.AUTO-MCNC: NEGATIVE MG/DL
HCT VFR BLD AUTO: 35.6 % (ref 35–47)
HGB BLD-MCNC: 10.8 G/DL (ref 11.5–16)
HGB UR QL STRIP: NEGATIVE
HYALINE CASTS URNS QL MICRO: ABNORMAL /LPF (ref 0–5)
IMM GRANULOCYTES # BLD AUTO: 0 K/UL (ref 0–0.04)
IMM GRANULOCYTES NFR BLD AUTO: 0 % (ref 0–0.5)
KETONES UR QL STRIP.AUTO: ABNORMAL MG/DL
LACTATE SERPL-SCNC: 1.7 MMOL/L (ref 0.4–2)
LEUKOCYTE ESTERASE UR QL STRIP.AUTO: ABNORMAL
LIPASE SERPL-CCNC: 290 U/L (ref 73–393)
LYMPHOCYTES # BLD: 0.5 K/UL (ref 0.8–3.5)
LYMPHOCYTES NFR BLD: 10 % (ref 12–49)
MCH RBC QN AUTO: 28.1 PG (ref 26–34)
MCHC RBC AUTO-ENTMCNC: 30.3 G/DL (ref 30–36.5)
MCV RBC AUTO: 92.5 FL (ref 80–99)
MONOCYTES # BLD: 0.5 K/UL (ref 0–1)
MONOCYTES NFR BLD: 10 % (ref 5–13)
MUCOUS THREADS URNS QL MICRO: ABNORMAL /LPF
NEUTS SEG # BLD: 3.7 K/UL (ref 1.8–8)
NEUTS SEG NFR BLD: 76 % (ref 32–75)
NITRITE UR QL STRIP.AUTO: POSITIVE
NRBC # BLD: 0 K/UL (ref 0–0.01)
NRBC BLD-RTO: 0 PER 100 WBC
PH UR STRIP: 5 [PH] (ref 5–8)
PLATELET # BLD AUTO: 116 K/UL (ref 150–400)
PMV BLD AUTO: 10.8 FL (ref 8.9–12.9)
POTASSIUM SERPL-SCNC: 4.3 MMOL/L (ref 3.5–5.1)
PROT SERPL-MCNC: 7 G/DL (ref 6.4–8.2)
PROT UR STRIP-MCNC: NEGATIVE MG/DL
Q-T INTERVAL, ECG07: 432 MS
QRS DURATION, ECG06: 80 MS
QTC CALCULATION (BEZET), ECG08: 456 MS
RBC # BLD AUTO: 3.85 M/UL (ref 3.8–5.2)
RBC #/AREA URNS HPF: ABNORMAL /HPF (ref 0–5)
RBC MORPH BLD: ABNORMAL
SODIUM SERPL-SCNC: 140 MMOL/L (ref 136–145)
SP GR UR REFRACTOMETRY: 1.02 (ref 1–1.03)
TROPONIN I SERPL-MCNC: <0.05 NG/ML
TROPONIN I SERPL-MCNC: <0.05 NG/ML
UA: UC IF INDICATED,UAUC: ABNORMAL
UROBILINOGEN UR QL STRIP.AUTO: 1 EU/DL (ref 0.2–1)
VENTRICULAR RATE, ECG03: 67 BPM
WBC # BLD AUTO: 5 K/UL (ref 3.6–11)
WBC URNS QL MICRO: ABNORMAL /HPF (ref 0–4)

## 2019-09-30 PROCEDURE — 74011636320 HC RX REV CODE- 636/320: Performed by: EMERGENCY MEDICINE

## 2019-09-30 PROCEDURE — 74011250636 HC RX REV CODE- 250/636: Performed by: EMERGENCY MEDICINE

## 2019-09-30 PROCEDURE — 85025 COMPLETE CBC W/AUTO DIFF WBC: CPT

## 2019-09-30 PROCEDURE — 83690 ASSAY OF LIPASE: CPT

## 2019-09-30 PROCEDURE — 74174 CTA ABD&PLVS W/CONTRAST: CPT

## 2019-09-30 PROCEDURE — 36415 COLL VENOUS BLD VENIPUNCTURE: CPT

## 2019-09-30 PROCEDURE — 99284 EMERGENCY DEPT VISIT MOD MDM: CPT

## 2019-09-30 PROCEDURE — 80053 COMPREHEN METABOLIC PANEL: CPT

## 2019-09-30 PROCEDURE — 93005 ELECTROCARDIOGRAM TRACING: CPT

## 2019-09-30 PROCEDURE — 71275 CT ANGIOGRAPHY CHEST: CPT

## 2019-09-30 PROCEDURE — 87086 URINE CULTURE/COLONY COUNT: CPT

## 2019-09-30 PROCEDURE — 81001 URINALYSIS AUTO W/SCOPE: CPT

## 2019-09-30 PROCEDURE — 83605 ASSAY OF LACTIC ACID: CPT

## 2019-09-30 PROCEDURE — 71045 X-RAY EXAM CHEST 1 VIEW: CPT

## 2019-09-30 PROCEDURE — 84484 ASSAY OF TROPONIN QUANT: CPT

## 2019-09-30 RX ORDER — NITROFURANTOIN 25; 75 MG/1; MG/1
100 CAPSULE ORAL 2 TIMES DAILY
Qty: 20 CAP | Refills: 0 | Status: SHIPPED | OUTPATIENT
Start: 2019-09-30 | End: 2019-10-10

## 2019-09-30 RX ORDER — SODIUM CHLORIDE 0.9 % (FLUSH) 0.9 %
10 SYRINGE (ML) INJECTION
Status: COMPLETED | OUTPATIENT
Start: 2019-09-30 | End: 2019-09-30

## 2019-09-30 RX ADMIN — Medication 10 ML: at 14:29

## 2019-09-30 RX ADMIN — IOPAMIDOL 100 ML: 755 INJECTION, SOLUTION INTRAVENOUS at 14:29

## 2019-09-30 RX ADMIN — SODIUM CHLORIDE 1000 ML: 900 INJECTION, SOLUTION INTRAVENOUS at 11:52

## 2019-09-30 NOTE — DISCHARGE INSTRUCTIONS

## 2019-09-30 NOTE — ED NOTES
Discharge instructions reviewed with patient. Discharge instructions given to patient per Dr. Kit Quinones Patient able to return/verbalize discharge instructions. Copy of discharge instructions given. Patient condition stable, respiratory status within normal limits, neuro status intact. Wheeled out of ER, accompanied by family.

## 2019-09-30 NOTE — ED PROVIDER NOTES
EMERGENCY DEPARTMENT HISTORY AND PHYSICAL EXAM      Date: 9/30/2019  Patient Name: Rinku Mallory    History of Presenting Illness     Chief Complaint   Patient presents with    Back Pain     Patient brought in via EMS with complaints of back pain intermittently and orthostatic hypotension    Hypotension    Syncope     Near syncope episode reported       History Provided By: Patient    HPI: Rinku Mallory, 66 y.o. female presents to the ED with cc of near syncope, back pain, and decreased vision after taking a shower today PTA. Patient reports over the last several weeks she has been having episodes of lightheadedness, decreased vision, the last for several minutes and then resolved. She has seen her primary care provider for the symptoms, who initially thought blood pressure medications may be the etiology. They decreased her blood pressure medicines, which did help for some time but the symptoms returned. They tried taking medications at night which did not help with symptoms. The patient had another episode this morning that was \"the worst one she has had \"and so she called EMS. She reports her symptoms were improved by the time the ambulance got there. She reports she additionally had back pain that was dull and achy and radiated up to the back of her neck during this time as well. Patient denies any weakness in her extremities or decreased sensation. Patient denies any history of aortic aneurysm or vessel disease. Patient denies any current back or chest pain. Patient denies any history of MI. There are no other complaints, changes, or physical findings at this time. PCP: Monique Mireles MD    No current facility-administered medications on file prior to encounter. Current Outpatient Medications on File Prior to Encounter   Medication Sig Dispense Refill    cyanocobalamin (VITAMIN B12) 1,000 mcg/mL injection 1,000 mcg by IntraMUSCular route once.       letrozole (FEMARA) 2.5 mg tablet Take 1 Tab by mouth daily. 30 Tab 11    vit a,c & e-lutein-minerals (OCUVITE) tablet Take 1 Tab by mouth daily.  CANNABIDIOL, CBD, EXTRACT PO Take 8 Drops by mouth two (2) times a day.  amLODIPine (NORVASC) 5 mg tablet Take 1 Tab by mouth daily. Indications: 2.5mg      tamsulosin (FLOMAX) 0.4 mg capsule Take 1 Cap by mouth daily.  peg 400-propylene glycol (SYSTANE, PROPYLENE GLYCOL,) 0.4-0.3 % drop Apply 1 Drop to eye as needed. Both eyes      atorvastatin (LIPITOR) 20 mg tablet Take 20 mg by mouth nightly.  levothyroxine (SYNTHROID) 75 mcg tablet Take 75 mcg by mouth Daily (before breakfast).  MULTIVITAMIN WITH MINERALS (HAIR,SKIN AND NAILS PO) Take 1 Tab by mouth daily.  temazepam (RESTORIL) 15 mg capsule Take 15 mg by mouth nightly as needed for Sleep.  acetaminophen (TYLENOL EXTRA STRENGTH) 500 mg tablet Take 1,000 mg by mouth every six (6) hours as needed for Pain.          Past History     Past Medical History:  Past Medical History:   Diagnosis Date    Breast cancer, right ([address]     Diet-controlled type 2 diabetes mellitus [facility]     GERD (gastroesophageal reflux disease)     Gum cancer ([facility] 2013    surgery / radiation    Hypertension     Hypothyroid     Ill-defined condition     no venipuncture left arm--hx skin graft    Overactive bladder     PUD (peptic ulcer disease)        Past Surgical History:  Past Surgical History:   Procedure Laterality Date    ABDOMEN SURGERY PROC UNLISTED  7/5/2013    Feeding tube placement ( has been removed)    HX BREAST BIOPSY Left     early 80's    HX BREAST LUMPECTOMY Right 3/28/2019    RIGHT BREAST LUMPECTOMY WITH ULTRASOUND performed by Deanie Harada, [facility] at [facility]    HX GI  1980's    gastric ulcer excision (1/3 stomach removed)    HX HEENT  2013    gum surgery    HX ORTHOPAEDIC      broken wrist    HX OTHER SURGICAL  2013    left arm veins/tissue used for oral/facial reconstruction    HX PELON AND BSO  HX TRACHEOSTOMY  2013    removed    HX TYMPANOSTOMY Bilateral 2015    tubes    [place] EGD INSERT GUIDE WIRE DILATOR PASSAGE ESOPHAGUS  2017         UPPER GI ENDOSCOPY,BALL DIL,30MM  10/26/2016         UPPER GI ENDOSCOPY,DILATN W GUIDE  2017         UPPER GI ENDOSCOPY,DILATN W GUIDE  3/27/2017         UPPER GI ENDOSCOPY,DILATN W GUIDE  2017         UPPER GI ENDOSCOPY,DILATN W GUIDE  2017         UPPER GI ENDOSCOPY,DILATN W GUIDE  10/2/2017         UPPER GI ENDOSCOPY,DILATN W GUIDE  11/10/2017            Family History:  Family History   Problem Relation Age of Onset    Cancer Father         colon    Hypertension Father     Stroke Father     Kidney Disease Mother     Breast Cancer Other     Anesth Problems Neg Hx        Social History:  Social History     Tobacco Use    Smoking status: Former Smoker     Last attempt to quit: 1980     Years since quittin.0    Smokeless tobacco: Never Used   Substance Use Topics    Alcohol use: No    Drug use: No       Allergies:  No Known Allergies      Review of Systems   Review of Systems   Constitutional: Negative for chills and fever. HENT: Negative for congestion and sore throat. Eyes: Positive for visual disturbance. Respiratory: Negative for cough and shortness of breath. Cardiovascular: Negative for chest pain and leg swelling. Gastrointestinal: Negative for abdominal pain, blood in stool, diarrhea and nausea. Endocrine: Negative for polyuria. Genitourinary: Negative for dysuria, flank pain, vaginal bleeding and vaginal discharge. Musculoskeletal: Positive for back pain. Negative for myalgias. Skin: Negative for rash. Allergic/Immunologic: Negative for immunocompromised state. Neurological: Positive for light-headedness. Negative for weakness and headaches. Psychiatric/Behavioral: Negative for confusion. Physical Exam   Physical Exam   Constitutional: She appears well-developed and well-nourished. No distress. HENT:   Right Ear: External ear normal.   Left Ear: External ear normal.   Mouth/Throat: Oropharynx is clear and moist.   Eyes: Pupils are equal, round, and reactive to light. Conjunctivae and EOM are normal. Right eye exhibits no discharge. Left eye exhibits no discharge. Neck: Normal range of motion. No tracheal deviation present. Cardiovascular: Normal rate, regular rhythm and normal heart sounds. Pulmonary/Chest: Effort normal and breath sounds normal. No respiratory distress. She has no wheezes. Abdominal: Soft. She exhibits no distension. There is no tenderness. There is no rebound. Musculoskeletal: Normal range of motion. She exhibits no deformity. Neurological: She is alert. Skin: Skin is warm and dry. She is not diaphoretic. Psychiatric: She has a normal mood and affect. Her behavior is normal.   Nursing note and vitals reviewed.       Diagnostic Study Results     Labs -     Recent Results (from the past 12 hour(s))   EKG, 12 LEAD, INITIAL    Collection Time: 09/30/19 11:31 AM   Result Value Ref Range    Ventricular Rate 67 BPM    Atrial Rate 64 BPM    QRS Duration 80 ms    Q-T Interval 432 ms    QTC Calculation (Bezet) 456 ms    Calculated R Axis -17 degrees    Calculated T Axis 3 degrees    Diagnosis       Accelerated Junctional rhythm  Moderate voltage criteria for LVH, may be normal variant  When compared with ECG of 07-NOV-2016 11:09,  Junctional rhythm has replaced Sinus rhythm     METABOLIC PANEL, COMPREHENSIVE    Collection Time: 09/30/19 11:52 AM   Result Value Ref Range    Sodium 140 136 - 145 mmol/L    Potassium 4.3 3.5 - 5.1 mmol/L    Chloride 109 (H) 97 - 108 mmol/L    CO2 26 21 - 32 mmol/L    Anion gap 5 5 - 15 mmol/L    Glucose 165 (H) 65 - 100 mg/dL    BUN 21 (H) 6 - 20 MG/DL    Creatinine 1.30 (H) 0.55 - 1.02 MG/DL    BUN/Creatinine ratio 16 12 - 20      GFR est AA 48 (L) >60 ml/min/1.73m2    GFR est non-AA 40 (L) >60 ml/min/1.73m2    Calcium 8.8 8.5 - 10.1 MG/DL    Bilirubin, total 0.8 0.2 - 1.0 MG/DL    ALT (SGPT) 16 12 - 78 U/L    AST (SGOT) 14 (L) 15 - 37 U/L    Alk. phosphatase 62 45 - 117 U/L    Protein, total 7.0 6.4 - 8.2 g/dL    Albumin 3.8 3.5 - 5.0 g/dL    Globulin 3.2 2.0 - 4.0 g/dL    A-G Ratio 1.2 1.1 - 2.2     CBC WITH AUTOMATED DIFF    Collection Time: 09/30/19 11:52 AM   Result Value Ref Range    WBC 5.0 3.6 - 11.0 K/uL    RBC 3.85 3.80 - 5.20 M/uL    HGB 10.8 (L) 11.5 - 16.0 g/dL    HCT 35.6 35.0 - 47.0 %    MCV 92.5 80.0 - 99.0 FL    MCH 28.1 26.0 - 34.0 PG    MCHC 30.3 30.0 - 36.5 g/dL    RDW 15.1 (H) 11.5 - 14.5 %    PLATELET 053 (L) 702 - 400 K/uL    MPV 10.8 8.9 - 12.9 FL    NRBC 0.0 0  WBC    ABSOLUTE NRBC 0.00 0.00 - 0.01 K/uL    NEUTROPHILS 76 (H) 32 - 75 %    LYMPHOCYTES 10 (L) 12 - 49 %    MONOCYTES 10 5 - 13 %    EOSINOPHILS 3 0 - 7 %    BASOPHILS 1 0 - 1 %    IMMATURE GRANULOCYTES 0 0.0 - 0.5 %    ABS. NEUTROPHILS 3.7 1.8 - 8.0 K/UL    ABS. LYMPHOCYTES 0.5 (L) 0.8 - 3.5 K/UL    ABS. MONOCYTES 0.5 0.0 - 1.0 K/UL    ABS. EOSINOPHILS 0.2 0.0 - 0.4 K/UL    ABS. BASOPHILS 0.1 0.0 - 0.1 K/UL    ABS. IMM.  GRANS. 0.0 0.00 - 0.04 K/UL    DF AUTOMATED      RBC COMMENTS NORMOCYTIC, NORMOCHROMIC     TROPONIN I    Collection Time: 09/30/19 11:52 AM   Result Value Ref Range    Troponin-I, Qt. <0.05 <0.05 ng/mL   LIPASE    Collection Time: 09/30/19 11:52 AM   Result Value Ref Range    Lipase 290 73 - 393 U/L   LACTIC ACID    Collection Time: 09/30/19 11:52 AM   Result Value Ref Range    Lactic acid 1.7 0.4 - 2.0 MMOL/L   URINALYSIS W/ REFLEX CULTURE    Collection Time: 09/30/19 12:19 PM   Result Value Ref Range    Color ORANGE      Appearance CLEAR CLEAR      Specific gravity 1.018 1.003 - 1.030      pH (UA) 5.0 5.0 - 8.0      Protein NEGATIVE  NEG mg/dL    Glucose NEGATIVE  NEG mg/dL    Ketone TRACE (A) NEG mg/dL    Blood NEGATIVE  NEG      Urobilinogen 1.0 0.2 - 1.0 EU/dL    Nitrites POSITIVE (A) NEG      Leukocyte Esterase SMALL (A) NEG WBC 10-20 0 - 4 /hpf    RBC 10-20 0 - 5 /hpf    Epithelial cells FEW FEW /lpf    Bacteria NEGATIVE  NEG /hpf    UA:UC IF INDICATED URINE CULTURE ORDERED (A) CNI      Mucus TRACE (A) NEG /lpf    Hyaline cast 5-10 0 - 5 /lpf   BILIRUBIN, CONFIRM    Collection Time: 09/30/19 12:19 PM   Result Value Ref Range    Bilirubin UA, confirm INDETERMINATE DUE TO COLOR INTERFERENCE (A) NEG         Radiologic Studies -   CTA CHEST W OR W WO CONT   Final Result   IMPRESSION:   1. No evidence of aneurysm or dissection. 2.  Oral and cardiomegaly with three-vessel coronary atherosclerotic disease. 3.  Atherosclerotic disease of the origins of the bilateral renal arteries. Again noted is dilation of the right ureter and right renal collecting system. .      4.  Persistent mild right-sided hydronephrosis and hydroureter of uncertain   clinical significance. 5.  Small hypervascular focus in the liver likely represents a rapid enhancing   hemangioma. 6.  Persistent dilated common bile duct also of uncertain clinical significance. CTA ABD PELV W WO CONT   Final Result   IMPRESSION:   1. No evidence of aneurysm or dissection. 2.  Oral and cardiomegaly with three-vessel coronary atherosclerotic disease. 3.  Atherosclerotic disease of the origins of the bilateral renal arteries. Again noted is dilation of the right ureter and right renal collecting system. .      4.  Persistent mild right-sided hydronephrosis and hydroureter of uncertain   clinical significance. 5.  Small hypervascular focus in the liver likely represents a rapid enhancing   hemangioma. 6.  Persistent dilated common bile duct also of uncertain clinical significance. XR CHEST PORT   Final Result   IMPRESSION:    Clear lungs. CT Results  (Last 48 hours)               09/30/19 1429  CTA CHEST W OR W WO CONT Final result    Impression:  IMPRESSION:   1. No evidence of aneurysm or dissection.        2.  Oral and cardiomegaly with three-vessel coronary atherosclerotic disease. 3.  Atherosclerotic disease of the origins of the bilateral renal arteries. Again noted is dilation of the right ureter and right renal collecting system. .       4.  Persistent mild right-sided hydronephrosis and hydroureter of uncertain   clinical significance. 5.  Small hypervascular focus in the liver likely represents a rapid enhancing   hemangioma. 6.  Persistent dilated common bile duct also of uncertain clinical significance. Narrative:  INDICATION: eval for aneurysm, dissection. Back pain, lightheadedness,   presyncope       EXAMINATION:  CT ANGIOGRAPHY CHEST       COMPARISON: 4/6/2019       TECHNIQUE:  CT Angiography of the chest, abdomen, and pelvis was performed   following the uneventful administration of 100 mL of Isovue-370.  3D image   postprocessing was performed. CT dose reduction was achieved through the use of   a standardized protocol tailored for this examination and automatic exposure   control for dose modulation. FINDINGS:       THYROID: Heterogeneous. MEDIASTINUM/MORGAN: No mass or lymphadenopathy. Small hiatal hernia is present. HEART/PERICARDIUM: Borderline heart size with coronary atherosclerotic disease. .   AORTA:  No evidence of aneurysm or dissection. Neural plaque extending through   the abdominal aorta. Atherosclerotic disease at the origin of the bilateral   renal arteries. Dustin Hay LUNGS: Unremarkable. Abdomen: Small nonspecific hypervascular focus in the right lobe the liver   likely present on the prior study and common bile duct is dilated measuring 13   mm this is slightly increased from the prior study probable duodenal   diverticulum is present. Appendix not visualized. The bowel is otherwise   unremarkable. No lymphadenopathy is noted. BONES: Degenerative changes are present in the thoracic and lumbar spine. .   ADDITIONAL COMMENTS: N/A.           09/30/19 1429  CTA ABD PELV W WO CONT Final result    Impression:  IMPRESSION:   1. No evidence of aneurysm or dissection. 2.  Oral and cardiomegaly with three-vessel coronary atherosclerotic disease. 3.  Atherosclerotic disease of the origins of the bilateral renal arteries. Again noted is dilation of the right ureter and right renal collecting system. .       4.  Persistent mild right-sided hydronephrosis and hydroureter of uncertain   clinical significance. 5.  Small hypervascular focus in the liver likely represents a rapid enhancing   hemangioma. 6.  Persistent dilated common bile duct also of uncertain clinical significance. Narrative:  INDICATION: eval for aneurysm, dissection. Back pain, lightheadedness,   presyncope       EXAMINATION:  CT ANGIOGRAPHY CHEST       COMPARISON: 4/6/2019       TECHNIQUE:  CT Angiography of the chest, abdomen, and pelvis was performed   following the uneventful administration of 100 mL of Isovue-370.  3D image   postprocessing was performed. CT dose reduction was achieved through the use of   a standardized protocol tailored for this examination and automatic exposure   control for dose modulation. FINDINGS:       THYROID: Heterogeneous. MEDIASTINUM/MORGAN: No mass or lymphadenopathy. Small hiatal hernia is present. HEART/PERICARDIUM: Borderline heart size with coronary atherosclerotic disease. .   AORTA:  No evidence of aneurysm or dissection. Neural plaque extending through   the abdominal aorta. Atherosclerotic disease at the origin of the bilateral   renal arteries. Destin Angry LUNGS: Unremarkable. Abdomen: Small nonspecific hypervascular focus in the right lobe the liver   likely present on the prior study and common bile duct is dilated measuring 13   mm this is slightly increased from the prior study probable duodenal   diverticulum is present. Appendix not visualized. The bowel is otherwise   unremarkable. No lymphadenopathy is noted.    BONES: Degenerative changes are present in the thoracic and lumbar spine. .   ADDITIONAL COMMENTS: N/A. CXR Results  (Last 48 hours)               09/30/19 1254  XR CHEST PORT Final result    Impression:  IMPRESSION:    Clear lungs. Narrative:  PORTABLE CHEST RADIOGRAPH/S: 9/30/2019 12:54 PM       INDICATION: Syncope. COMPARISON: 9/20/2018, 10/27/2017. TECHNIQUE: Portable frontal upright radiograph/s of the chest.       FINDINGS:    The lungs are clear. The central airways are patent. No pneumothorax or pleural   effusion. There are surgical clips around the GE junction. Medical Decision Making   I am the first provider for this patient. I reviewed the vital signs, available nursing notes, past medical history, past surgical history, family history and social history. Vital Signs-Reviewed the patient's vital signs. Patient Vitals for the past 12 hrs:   Temp Pulse Resp BP SpO2   09/30/19 1134 97.6 °F (36.4 °C) 70 18 158/68 97 %       EKG interpretation: (Preliminary)  Rhythm: normal sinus rhythm; and regular . Rate (approx.): 67; Axis: left axis deviation; VA interval: normal; QRS interval: normal ; ST/T wave: normal; Other findings: non-ischemic. Records Reviewed: Nursing Notes, Old Medical Records, Previous electrocardiograms, Previous Radiology Studies and Previous Laboratory Studies    Provider Notes (Medical Decision Making):   Patient presents with back pain, lightheadedness, decreased vision with a history of hypertension. Concern for aortic dissection or aneurysm, will get CTA of the chest and abdomen and pelvis immediately. Differential also includes dehydration, overmedication with blood pressure medicines, orthostasis and normal aging with patient's vasculature not able to adjust to quick changes in positioning. Will check orthostatic vitals as well as BMP for electrolyte abnormalities.   Will check CBC and chest x-ray for signs of infection    ED Course:   Initial assessment performed. The patients presenting problems have been discussed, and they are in agreement with the care plan formulated and outlined with them. I have encouraged them to ask questions as they arise throughout their visit. ED Course as of Sep 30 1639   Mon Sep 30, 2019   1158 EKG per my interpretation normal sinus rhythm, rate 67 bpm, normal axis, no acute ischemic changes, no QTC prolongation or any interval change.    [AK]      ED Course User Index  [AK] Reyna Tolbert MD     4:30 PM  Awaiting delta troponin. Patient has been asymptomatic during her entire stay in the ED. Patient is asking if she can go home. I have discussed with patient the benefit for staying for her second troponin and other laboratory results. Patient agrees to stay and understands we will start Abx for UTI. Patient will follow up with PCP and has urology appt later this week. Return precautions discussed with patient. Critical Care Time:       Disposition:      PLAN:    Home with PCP follow up    1. Current Discharge Medication List        2. Follow-up Information    None       Return to ED if worse     Diagnosis     Clinical Impression: No diagnosis found. Attestations:    Mikey Altamirano MD    Please note that this dictation was completed with [employer], the computer voice recognition software. Quite often unanticipated grammatical, syntax, homophones, and other interpretive errors are inadvertently transcribed by the computer software. Please disregard these errors. Please excuse any errors that have escaped final proofreading. Thank you.

## 2019-10-02 LAB
BACTERIA SPEC CULT: NORMAL
CC UR VC: NORMAL
SERVICE CMNT-IMP: NORMAL

## 2019-10-17 ENCOUNTER — OFFICE VISIT (OUTPATIENT)
Dept: SURGERY | Age: 74
End: 2019-10-17

## 2019-10-17 VITALS
BODY MASS INDEX: 24.84 KG/M2 | HEART RATE: 66 BPM | HEIGHT: 62 IN | WEIGHT: 135 LBS | DIASTOLIC BLOOD PRESSURE: 79 MMHG | SYSTOLIC BLOOD PRESSURE: 184 MMHG

## 2019-10-17 DIAGNOSIS — D05.11 DUCTAL CARCINOMA IN SITU (DCIS) OF RIGHT BREAST: ICD-10-CM

## 2019-10-17 DIAGNOSIS — D05.01 NEOPLASM OF RIGHT BREAST, PRIMARY TUMOR STAGING CATEGORY TIS: LOBULAR CARCINOMA IN SITU (LCIS): Primary | ICD-10-CM

## 2019-10-17 NOTE — PATIENT INSTRUCTIONS

## 2019-10-17 NOTE — PROGRESS NOTES
HISTORY OF PRESENT ILLNESS  Kaylee Silver is a 66 y.o. female. HPI ESTABLISHED patient here for follow up RIGHT breast cancer. No breast problems to report. Taking Letrozole. BP is up in office today 184/79. She said that her PCP recently took her off of her BP meds for low blood pressure; will retake at home and follow-up with PCP.      Breast history-  2/11/19 - RIGHT breast biopsy - 67 yo female with right breast DCIS, grade 2 Stage 0 Er 100%  3/28/19 - RIGHT breast lumpectomy. LCIS. No DCIS.   4/2019- started Letrozole - Dr. Teresita Mckoy      OB History    None      Obstetric Comments   Menarche:  13. LMP: 1980's. # of Children:  2. Age at Delivery of First Child:  19.   Hysterectomy/oophorectomy:  YES/YES. Breast Bx:  yes. Hx of Breast Feeding:  no. BCP:  yes. Hormone therapy:  yes.                 Past Surgical History:   Procedure Laterality Date    ABDOMEN SURGERY PROC UNLISTED  7/5/2013    Feeding tube placement ( has been removed)    HX BREAST BIOPSY Left     early [de-identified]    HX BREAST LUMPECTOMY Right 3/28/2019    RIGHT BREAST LUMPECTOMY WITH ULTRASOUND performed by Ruma Wang MD at [facility] AMBULATORY OR    HX GI  1980's    gastric ulcer excision (1/3 stomach removed)    HX HEENT  2013    gum surgery    HX ORTHOPAEDIC      broken wrist    HX OTHER SURGICAL  2013    left arm veins/tissue used for oral/facial reconstruction    HX PELON AND BSO      HX TRACHEOSTOMY  2013    removed    HX TYMPANOSTOMY Bilateral 2015    tubes    [place] EGD INSERT GUIDE WIRE DILATOR PASSAGE ESOPHAGUS  8/21/2017         UPPER GI ENDOSCOPY,WM DIL,30MM  10/26/2016         UPPER GI ENDOSCOPY,DILATN W GUIDE  2/27/2017         UPPER GI ENDOSCOPY,DILATN W GUIDE  3/27/2017         UPPER GI ENDOSCOPY,DILATN W GUIDE  4/12/2017         UPPER GI ENDOSCOPY,DILATN W GUIDE  5/1/2017         UPPER GI ENDOSCOPY,DILATN W GUIDE  10/2/2017         UPPER GI ENDOSCOPY,DILATN W GUIDE  11/10/2017          Family history-   Maternal cousin had breast cancer (unsure what age). Father had colon cancer.     Breast imaging-  Mammogram 1/2019 - prior to treatment    ROS    Physical Exam   Constitutional: She appears well-developed and well-nourished. Pulmonary/Chest: Right breast exhibits no inverted nipple, no mass, no nipple discharge, no skin change and no tenderness. Left breast exhibits no inverted nipple, no mass, no nipple discharge, no skin change and no tenderness. Breasts are symmetrical.   Musculoskeletal: Normal range of motion. UE x 2   Lymphadenopathy:     She has no axillary adenopathy. Right: No supraclavicular adenopathy present. Left: No supraclavicular adenopathy present. Skin: Skin is warm, dry and intact. Chest and breasts examined   Psychiatric: She has a normal mood and affect. Her speech is normal and behavior is normal.     Visit Vitals  /79   Pulse 66   Ht 5' 2\" (1.575 m)   Wt 135 lb (61.2 kg)   BMI 24.69 kg/m²     ASSESSMENT and PLAN  Encounter Diagnoses   Name Primary?  Neoplasm of right breast, primary tumor staging category Tis: lobular carcinoma in situ (LCIS) Yes    Ductal carcinoma in situ (DCIS) of right breast      Normal exam with no evidence of local recurrence. BDmammogram 3D in 1/2020. Continue on AI and has follow-up with Dr. Angel Jc. RTC in 1 year or sooner PRN. She is comfortable with this plan. All questions answered and she stated understanding.

## 2020-01-24 ENCOUNTER — HOSPITAL ENCOUNTER (OUTPATIENT)
Dept: MAMMOGRAPHY | Age: 75
Discharge: HOME | End: 2020-01-24
Attending: NURSE PRACTITIONER
Payer: MEDICARE

## 2020-01-24 DIAGNOSIS — D05.01 NEOPLASM OF RIGHT BREAST, PRIMARY TUMOR STAGING CATEGORY TIS: LOBULAR CARCINOMA IN SITU (LCIS): ICD-10-CM

## 2020-01-24 DIAGNOSIS — D05.11 DUCTAL CARCINOMA IN SITU (DCIS) OF RIGHT BREAST: ICD-10-CM

## 2020-01-24 PROCEDURE — 77062 BREAST TOMOSYNTHESIS BI: CPT

## 2020-01-27 ENCOUNTER — OFFICE VISIT (OUTPATIENT)
Dept: ONCOLOGY | Age: 75
End: 2020-01-27

## 2020-01-27 VITALS
HEIGHT: 62 IN | SYSTOLIC BLOOD PRESSURE: 199 MMHG | HEART RATE: 62 BPM | DIASTOLIC BLOOD PRESSURE: 76 MMHG | WEIGHT: 137 LBS | OXYGEN SATURATION: 98 % | RESPIRATION RATE: 16 BRPM | TEMPERATURE: 98.2 F | BODY MASS INDEX: 25.21 KG/M2

## 2020-01-27 DIAGNOSIS — D05.01 NEOPLASM OF RIGHT BREAST, PRIMARY TUMOR STAGING CATEGORY TIS: LOBULAR CARCINOMA IN SITU (LCIS): Primary | ICD-10-CM

## 2020-01-27 NOTE — PROGRESS NOTES
67 y/o female here for f/u appt. Right sided breast cancer. Pt is on letrozole as of 4/2019. Pt has not had dexa done. 1. Have you been to the ER, urgent care clinic since your last visit? Hospitalized since your last visit? Pt went to [facility] er on 9/30/2019 for near syncopal episodes. 2. Have you seen or consulted any other health care providers outside of the [address] [place] [address] since your last visit? Include any pap smears or colon screening.  Yes January 2020 for routine follow up visit

## 2020-01-27 NOTE — PROGRESS NOTES
[address], [address] [place] [address] Jasvir Barrios, [address] [facility]  [phone]        Oncology Progress Note        Patient: Calin Blanchard MRN: [id number]  SSN: [id number]    YOB: 1945  Age: 66 y.o. Sex: female        Diagnosis:     1. Right breast LCIS    Nuclear grade 2  %    Treatment:     1. Letrozole 2.5 mg daily  2. S/p lumpectomy 3/28/2019 - Dr. Claudine Aparicio    Subjective:      Calin Blanchard is a 66 y.o. female who I see for right breast LCIS. She underwent a screening mammogram and was noted to have suspicious calcification at 12 O'clock in the right breast. A biopsy of the area revealed DCIS. She then underwent a lumpectomy on 03/28/2019. The final pathology was read at [facility], ER +ve. She is taking Letrozole without side effects. She feels well today and remains active. Denies any new symptoms.       Review of Systems:    Constitutional: negative  Eyes: negative  Ears, Nose, Mouth, Throat, and Face: negative  Respiratory: negative  Cardiovascular: negative  Gastrointestinal: negative  Genitourinary:negative  Integument/Breast: negative  Hematologic/Lymphatic: negative  Musculoskeletal:negative  Neurological: negative        Past Medical History:   Diagnosis Date    Breast cancer, right ([address] 2019/3    rt lumpectomy -no radiation or chemo, taking pill-estorgen blocker    Diet-controlled type 2 diabetes mellitus ([address]     GERD (gastroesophageal reflux disease)     Gum cancer ([address] 2013    surgery / radiation    Hypertension     Hypothyroid     Ill-defined condition     no venipuncture left arm--hx skin graft    Overactive bladder     PUD (peptic ulcer disease)      Past Surgical History:   Procedure Laterality Date    ABDOMEN SURGERY PROC UNLISTED  7/5/2013    Feeding tube placement ( has been removed)    HX BREAST BIOPSY Left     early 80's    HX BREAST LUMPECTOMY Right 3/28/2019    RIGHT BREAST LUMPECTOMY WITH ULTRASOUND performed by Martene Mohs, MD at [facility] AMBULATORY OR    HX GI      gastric ulcer excision (1/3 stomach removed)    HX HEENT      gum surgery    HX ORTHOPAEDIC      broken wrist    HX OTHER SURGICAL      left arm veins/tissue used for oral/facial reconstruction    HX PELON AND BSO      HX TRACHEOSTOMY      removed    HX TYMPANOSTOMY Bilateral 2015    tubes    [place] EGD INSERT GUIDE WIRE DILATOR PASSAGE ESOPHAGUS  2017         UPPER GI ENDOSCOPY,BALL DIL,30MM  10/26/2016         UPPER GI ENDOSCOPY,DILATN W GUIDE  2017         UPPER GI ENDOSCOPY,DILATN W GUIDE  3/27/2017         UPPER GI ENDOSCOPY,DILATN W GUIDE  2017         UPPER GI ENDOSCOPY,DILATN W GUIDE  2017         UPPER GI ENDOSCOPY,DILATN W GUIDE  10/2/2017         UPPER GI ENDOSCOPY,DILATN W GUIDE  11/10/2017           Family History   Problem Relation Age of Onset    Cancer Father         colon    Hypertension Father     Stroke Father     Kidney Disease Mother     Breast Cancer Other     Anesth Problems Neg Hx      Social History     Tobacco Use    Smoking status: Former Smoker     Last attempt to quit: 1980     Years since quittin.3    Smokeless tobacco: Never Used   Substance Use Topics    Alcohol use: No      Prior to Admission medications    Medication Sig Start Date End Date Taking? Authorizing Provider   cranberry fruit extract (CRANBERRY PO) Take  by mouth. Yes Provider, Historical   cyanocobalamin (VITAMIN B12) 1,000 mcg/mL injection 1,000 mcg by IntraMUSCular route once. Yes Provider, Historical   letrozole (FEMARA) 2.5 mg tablet Take 1 Tab by mouth daily. 19  Yes George Haines, NP   vit a,c & e-lutein-minerals (OCUVITE) tablet Take 1 Tab by mouth daily. Yes Provider, Historical   CANNABIDIOL, CBD, EXTRACT PO Take 8 Drops by mouth two (2) times a day.    Yes Provider, Historical   peg 400-propylene glycol (SYSTANE, PROPYLENE GLYCOL,) 0.4-0.3 % drop Apply 1 Drop to eye as needed. Both eyes   Yes Provider, Historical   atorvastatin (LIPITOR) 20 mg tablet Take 20 mg by mouth nightly. Yes Provider, Historical   levothyroxine (SYNTHROID) 75 mcg tablet Take 75 mcg by mouth Daily (before breakfast). Yes Provider, Historical   acetaminophen (TYLENOL EXTRA STRENGTH) 500 mg tablet Take 1,000 mg by mouth every six (6) hours as needed for Pain. Yes Provider, Historical   MULTIVITAMIN WITH MINERALS (HAIR,SKIN AND NAILS PO) Take 1 Tab by mouth daily. Yes Provider, Historical   temazepam (RESTORIL) 15 mg capsule Take 15 mg by mouth nightly as needed for Sleep. Yes Provider, Historical        No Known Allergies        Objective:     Visit Vitals  /76 (BP 1 Location: Right arm, BP Patient Position: At rest) Comment: \"its because i am here\"   Pulse 62   Temp 98.2 °F (36.8 °C) (Oral)   Resp 16   Ht 5' 2\" (1.575 m)   Wt 137 lb (62.1 kg)   SpO2 98% Comment: RA   BMI 25.06 kg/m²       Pain Scale: 0 - No pain/10  Pain Location:       Physical Exam:    GENERAL: alert, cooperative, no distress, appears stated age  EYE: conjunctivae/corneas clear. PERRL, EOM's intact  LYMPHATIC: Cervical, supraclavicular, and axillary nodes normal.   THROAT & NECK: normal and no erythema or exudates noted. LUNG: clear to auscultation bilaterally  HEART: regular rate and rhythm, S1, S2 normal, no murmur, click, rub or gallop  ABDOMEN: soft, non-tender. Bowel sounds normal. No masses,  no organomegaly  BREAST: deferred at this visit. Breast NP visit in October  EXTREMITIES:  extremities normal, atraumatic, no cyanosis or edema  SKIN: Normal.  NEUROLOGIC: AOx3. Gait normal. Reflexes and motor strength normal and symmetric. Cranial nerves 2-12 and sensation grossly intact. Assessment:     1.  Right breast LCIS    Nuclear grade 2  %    ECOG PS 0  Intent of Treatment - curative  Prognosis - excellent     On letrozole 2.5 mg - started 4/2019  Tolerating treatment well  Asymptomatic  In remission    Mammogram (1/24/2020): normal    Dexa scan ordered - she had oral cancer. If dexa is normal would not recommend Zometa use. She goes to the gym 4-5 times a week. Symptom management form reviewed with patient. Plan:       > Continue Letrozole  > Dexa scan ordered - patient given information to call and schedule  > Follow-up in 6 months    I saw the patient in conjunction with Yaya Yates NP      Signed by: Roro Narayanan MD                     January 27, 2020        CC. Meme Herrera MD  CC.  Saira Garay MD

## 2020-02-23 ENCOUNTER — APPOINTMENT (OUTPATIENT)
Dept: GENERAL RADIOLOGY | Age: 75
End: 2020-02-23
Attending: EMERGENCY MEDICINE
Payer: MEDICARE

## 2020-02-23 ENCOUNTER — HOSPITAL ENCOUNTER (EMERGENCY)
Age: 75
Discharge: HOME | End: 2020-02-23
Attending: EMERGENCY MEDICINE
Payer: MEDICARE

## 2020-02-23 VITALS
HEART RATE: 62 BPM | OXYGEN SATURATION: 95 % | HEIGHT: 62 IN | RESPIRATION RATE: 17 BRPM | DIASTOLIC BLOOD PRESSURE: 58 MMHG | SYSTOLIC BLOOD PRESSURE: 141 MMHG | WEIGHT: 135 LBS | BODY MASS INDEX: 24.84 KG/M2 | TEMPERATURE: 98 F

## 2020-02-23 DIAGNOSIS — R55 NEAR SYNCOPE: Primary | ICD-10-CM

## 2020-02-23 DIAGNOSIS — M54.6 ACUTE MIDLINE THORACIC BACK PAIN: ICD-10-CM

## 2020-02-23 LAB
ALBUMIN SERPL-MCNC: 3.5 G/DL (ref 3.5–5)
ALBUMIN/GLOB SERPL: 1.1 {RATIO} (ref 1.1–2.2)
ALP SERPL-CCNC: 72 U/L (ref 45–117)
ALT SERPL-CCNC: 15 U/L (ref 12–78)
ANION GAP SERPL CALC-SCNC: 5 MMOL/L (ref 5–15)
AST SERPL-CCNC: 11 U/L (ref 15–37)
ATRIAL RATE: 65 BPM
BASOPHILS # BLD: 0 K/UL (ref 0–0.1)
BASOPHILS NFR BLD: 1 % (ref 0–1)
BILIRUB SERPL-MCNC: 0.8 MG/DL (ref 0.2–1)
BUN SERPL-MCNC: 17 MG/DL (ref 6–20)
BUN/CREAT SERPL: 15 (ref 12–20)
CALCIUM SERPL-MCNC: 8.5 MG/DL (ref 8.5–10.1)
CALCULATED P AXIS, ECG09: 52 DEGREES
CALCULATED R AXIS, ECG10: -17 DEGREES
CALCULATED T AXIS, ECG11: -2 DEGREES
CHLORIDE SERPL-SCNC: 108 MMOL/L (ref 97–108)
CO2 SERPL-SCNC: 27 MMOL/L (ref 21–32)
CREAT SERPL-MCNC: 1.12 MG/DL (ref 0.55–1.02)
DIAGNOSIS, 93000: NORMAL
DIFFERENTIAL METHOD BLD: ABNORMAL
EOSINOPHIL # BLD: 0.1 K/UL (ref 0–0.4)
EOSINOPHIL NFR BLD: 3 % (ref 0–7)
ERYTHROCYTE [DISTWIDTH] IN BLOOD BY AUTOMATED COUNT: 15.9 % (ref 11.5–14.5)
GLOBULIN SER CALC-MCNC: 3.2 G/DL (ref 2–4)
GLUCOSE SERPL-MCNC: 149 MG/DL (ref 65–100)
HCT VFR BLD AUTO: 37.5 % (ref 35–47)
HGB BLD-MCNC: 11.3 G/DL (ref 11.5–16)
IMM GRANULOCYTES # BLD AUTO: 0 K/UL (ref 0–0.04)
IMM GRANULOCYTES NFR BLD AUTO: 0 % (ref 0–0.5)
LYMPHOCYTES # BLD: 0.6 K/UL (ref 0.8–3.5)
LYMPHOCYTES NFR BLD: 12 % (ref 12–49)
MCH RBC QN AUTO: 26.1 PG (ref 26–34)
MCHC RBC AUTO-ENTMCNC: 30.1 G/DL (ref 30–36.5)
MCV RBC AUTO: 86.6 FL (ref 80–99)
MONOCYTES # BLD: 0.6 K/UL (ref 0–1)
MONOCYTES NFR BLD: 12 % (ref 5–13)
NEUTS SEG # BLD: 3.5 K/UL (ref 1.8–8)
NEUTS SEG NFR BLD: 72 % (ref 32–75)
NRBC # BLD: 0 K/UL (ref 0–0.01)
NRBC BLD-RTO: 0 PER 100 WBC
P-R INTERVAL, ECG05: 158 MS
PLATELET # BLD AUTO: 119 K/UL (ref 150–400)
PMV BLD AUTO: 11 FL (ref 8.9–12.9)
POTASSIUM SERPL-SCNC: 4.5 MMOL/L (ref 3.5–5.1)
PROT SERPL-MCNC: 6.7 G/DL (ref 6.4–8.2)
Q-T INTERVAL, ECG07: 426 MS
QRS DURATION, ECG06: 88 MS
QTC CALCULATION (BEZET), ECG08: 443 MS
RBC # BLD AUTO: 4.33 M/UL (ref 3.8–5.2)
RBC MORPH BLD: ABNORMAL
SODIUM SERPL-SCNC: 140 MMOL/L (ref 136–145)
TROPONIN I SERPL-MCNC: <0.05 NG/ML
VENTRICULAR RATE, ECG03: 65 BPM
WBC # BLD AUTO: 4.8 K/UL (ref 3.6–11)

## 2020-02-23 PROCEDURE — 99284 EMERGENCY DEPT VISIT MOD MDM: CPT

## 2020-02-23 PROCEDURE — 36415 COLL VENOUS BLD VENIPUNCTURE: CPT

## 2020-02-23 PROCEDURE — 71045 X-RAY EXAM CHEST 1 VIEW: CPT

## 2020-02-23 PROCEDURE — 85025 COMPLETE CBC W/AUTO DIFF WBC: CPT

## 2020-02-23 PROCEDURE — 80053 COMPREHEN METABOLIC PANEL: CPT

## 2020-02-23 PROCEDURE — 93005 ELECTROCARDIOGRAM TRACING: CPT

## 2020-02-23 PROCEDURE — 84484 ASSAY OF TROPONIN QUANT: CPT

## 2020-02-23 NOTE — ED PROVIDER NOTES
EMERGENCY DEPARTMENT HISTORY AND PHYSICAL EXAM      Date: 2/23/2020  Patient Name: Andrew Lopez  Patient Age and Sex: 66 y.o. female     History of Presenting Illness     Chief Complaint   Patient presents with    Fatigue     pt arrived via EMS from home. pt states when she got out of the shower she became very weak, sweaty and felt like she was going to throw up. Pt denies SOB/CP at this time. pt states this has happened before and was seen here for it. History Provided By: Patient    HPI: Andrew Lopez, 66 y.o. female with past medical history of GERD, breast cancer, hypertension, peptic ulcer disease presenting today with an episode of presyncope. The patient reports that she typically will take her medications in the morning, and then have a hot shower. Today she had a hot shower and while she was in the shower she became weak, with an episode of nausea, feeling like she may pass out. She also reports upper back pain at that time. The patient reports that her symptoms quickly resolved as she rested. She denies any shortness of breath or chest pain at this time. No fevers, chills, vomiting or weakness in the past several days. That she has been seen 1 other time here in the emergency department for similar symptoms. No prior history of CAD. There are no other complaints, changes, or physical findings at this time. PCP: Gianna Westbrook MD    No current facility-administered medications on file prior to encounter. Current Outpatient Medications on File Prior to Encounter   Medication Sig Dispense Refill    cranberry fruit extract (CRANBERRY PO) Take  by mouth.  cyanocobalamin (VITAMIN B12) 1,000 mcg/mL injection 1,000 mcg by IntraMUSCular route once.  letrozole (FEMARA) 2.5 mg tablet Take 1 Tab by mouth daily. 30 Tab 11    vit a,c & e-lutein-minerals (OCUVITE) tablet Take 1 Tab by mouth daily.  CANNABIDIOL, CBD, EXTRACT PO Take 8 Drops by mouth two (2) times a day.  peg 400-propylene glycol (SYSTANE, PROPYLENE GLYCOL,) 0.4-0.3 % drop Apply 1 Drop to eye as needed. Both eyes      atorvastatin (LIPITOR) 20 mg tablet Take 20 mg by mouth nightly.  levothyroxine (SYNTHROID) 75 mcg tablet Take 75 mcg by mouth Daily (before breakfast).  acetaminophen (TYLENOL EXTRA STRENGTH) 500 mg tablet Take 1,000 mg by mouth every six (6) hours as needed for Pain.  MULTIVITAMIN WITH MINERALS (HAIR,SKIN AND NAILS PO) Take 1 Tab by mouth daily.  temazepam (RESTORIL) 15 mg capsule Take 15 mg by mouth nightly as needed for Sleep.          Past History     Past Medical History:  Past Medical History:   Diagnosis Date    Breast cancer, right ([facility] 2019/3    rt lumpectomy -no radiation or chemo, taking pill-estorgen blocker    Diet-controlled type 2 diabetes mellitus (HCC)     GERD (gastroesophageal reflux disease)     Gum cancer ([facility] 2013    surgery / radiation    Hypertension     Hypothyroid     Ill-defined condition     no venipuncture left arm--hx skin graft    Overactive bladder     PUD (peptic ulcer disease)        Past Surgical History:  Past Surgical History:   Procedure Laterality Date    ABDOMEN SURGERY PROC UNLISTED  7/5/2013    Feeding tube placement ( has been removed)    HX BREAST BIOPSY Left     early 80's    HX BREAST LUMPECTOMY Right 3/28/2019    RIGHT BREAST LUMPECTOMY WITH ULTRASOUND performed by Belinda Castaneda MD at [facility] AM[facility] OR    HX GI  1980's    gastric ulcer excision (1/3 stomach removed)    HX HEENT  2013    gum surgery    HX ORTHOPAEDIC      broken wrist    HX OTHER SURGICAL  2013    left arm veins/tissue used for oral/facial reconstruction    HX PELON AND BSO      HX TRACHEOSTOMY  2013    removed    HX TYMPANOSTOMY Bilateral 2015    tubes    [place] EGD INSERT GUIDE WIRE DILATOR PASSAGE ESOPHAGUS  8/21/2017         UPPER GI ENDOSCOPY,BALL DIL,30MM  10/26/2016         UPPER GI ENDOSCOPY,DILATN W GUIDE  2/27/2017         UPPER GI ENDOSCOPY,DILATN W GUIDE  3/27/2017         UPPER GI ENDOSCOPY,DILATN W GUIDE  2017         UPPER GI ENDOSCOPY,DILATN W GUIDE  2017         UPPER GI ENDOSCOPY,DILATN W GUIDE  10/2/2017         UPPER GI ENDOSCOPY,DILATN W GUIDE  11/10/2017            Family History:  Family History   Problem Relation Age of Onset    Cancer Father         colon    Hypertension Father     Stroke Father     Kidney Disease Mother     Breast Cancer Other     Anesth Problems Neg Hx        Social History:  Social History     Tobacco Use    Smoking status: Former Smoker     Last attempt to quit: 1980     Years since quittin.4    Smokeless tobacco: Never Used   Substance Use Topics    Alcohol use: No    Drug use: No       Allergies:  No Known Allergies      Review of Systems   Constitutional: No  fever,  No  headache  Skin: No  rash, No  jaundice  HEENT: No  nasal congestion, No  eye drainage. Resp: No cough,  No  wheezing  CV: No chest pain, No  palpitations  GI: No vomiting,  No  diarrhea.,  No  constipation  : No dysuria,  No  hematuria  MSK: No joint pain,  No  trauma  Neuro: No numbness, No  tingling  Psych: No suicidal, No  paranoid      Physical Exam     Patient Vitals for the past 12 hrs:   Temp Pulse Resp BP SpO2   20 1241  62 17  95 %   20 1215  62 22  95 %   20 1058 98 °F (36.7 °C) 71 18 141/58 94 %     General: alert, No acute distress  Eyes: EOMI, normal conjunctiva  ENT: moist mucous membranes. Neck: Active, full ROM of neck. Skin: No rashes. no jaundice              Lungs: Equal chest expansion. no respiratory distress. clear to auscultation bilaterally No accessory muscle usage  Heart: regular rate     no peripheral edema   2+ radial pulses and DPs bilaterally  Abd:  non distended soft, nontender. No rebound tenderness. No guarding  Back: Full ROM  MSK: Full, active ROM in all 4 extremities.    Neuro: Person, Place, Time and Situation; normal speech;   Psych: Cooperative with exam; Appropriate mood and affect             Diagnostic Study Results     Labs -     Recent Results (from the past 12 hour(s))   EKG, 12 LEAD, INITIAL    Collection Time: 02/23/20 11:00 AM   Result Value Ref Range    Ventricular Rate 65 BPM    Atrial Rate 65 BPM    P-R Interval 158 ms    QRS Duration 88 ms    Q-T Interval 426 ms    QTC Calculation (Bezet) 443 ms    Calculated P Axis 52 degrees    Calculated R Axis -17 degrees    Calculated T Axis -2 degrees    Diagnosis       Normal sinus rhythm  Minimal voltage criteria for LVH, may be normal variant  When compared with ECG of 30-SEP-2019 11:31,  Previous ECG has undetermined rhythm, needs review     CBC WITH AUTOMATED DIFF    Collection Time: 02/23/20 11:18 AM   Result Value Ref Range    WBC 4.8 3.6 - 11.0 K/uL    RBC 4.33 3.80 - 5.20 M/uL    HGB 11.3 (L) 11.5 - 16.0 g/dL    HCT 37.5 35.0 - 47.0 %    MCV 86.6 80.0 - 99.0 FL    MCH 26.1 26.0 - 34.0 PG    MCHC 30.1 30.0 - 36.5 g/dL    RDW 15.9 (H) 11.5 - 14.5 %    PLATELET 957 (L) 146 - 400 K/uL    MPV 11.0 8.9 - 12.9 FL    NRBC 0.0 0  WBC    ABSOLUTE NRBC 0.00 0.00 - 0.01 K/uL    NEUTROPHILS 72 32 - 75 %    LYMPHOCYTES 12 12 - 49 %    MONOCYTES 12 5 - 13 %    EOSINOPHILS 3 0 - 7 %    BASOPHILS 1 0 - 1 %    IMMATURE GRANULOCYTES 0 0.0 - 0.5 %    ABS. NEUTROPHILS 3.5 1.8 - 8.0 K/UL    ABS. LYMPHOCYTES 0.6 (L) 0.8 - 3.5 K/UL    ABS. MONOCYTES 0.6 0.0 - 1.0 K/UL    ABS. EOSINOPHILS 0.1 0.0 - 0.4 K/UL    ABS. BASOPHILS 0.0 0.0 - 0.1 K/UL    ABS. IMM.  GRANS. 0.0 0.00 - 0.04 K/UL    DF AUTOMATED      RBC COMMENTS ANISOCYTOSIS  1+       METABOLIC PANEL, COMPREHENSIVE    Collection Time: 02/23/20 11:18 AM   Result Value Ref Range    Sodium 140 136 - 145 mmol/L    Potassium 4.5 3.5 - 5.1 mmol/L    Chloride 108 97 - 108 mmol/L    CO2 27 21 - 32 mmol/L    Anion gap 5 5 - 15 mmol/L    Glucose 149 (H) 65 - 100 mg/dL    BUN 17 6 - 20 MG/DL    Creatinine 1.12 (H) 0.55 - 1.02 MG/DL    BUN/Creatinine ratio 15 12 - 20 GFR est AA 57 (L) >60 ml/min/1.73m2    GFR est non-AA 47 (L) >60 ml/min/1.73m2    Calcium 8.5 8.5 - 10.1 MG/DL    Bilirubin, total 0.8 0.2 - 1.0 MG/DL    ALT (SGPT) 15 12 - 78 U/L    AST (SGOT) 11 (L) 15 - 37 U/L    Alk. phosphatase 72 45 - 117 U/L    Protein, total 6.7 6.4 - 8.2 g/dL    Albumin 3.5 3.5 - 5.0 g/dL    Globulin 3.2 2.0 - 4.0 g/dL    A-G Ratio 1.1 1.1 - 2.2     TROPONIN I    Collection Time: 02/23/20 11:18 AM   Result Value Ref Range    Troponin-I, Qt. <0.05 <0.05 ng/mL       Radiologic Studies -   XR CHEST PORT   Final Result   Impression:   No acute cardiopulmonary process. CT Results  (Last 48 hours)    None        CXR Results  (Last 48 hours)               02/23/20 1211  XR CHEST PORT Final result    Impression:  Impression:   No acute cardiopulmonary process. Narrative:  Chest portable AP       History: Chest pain       Comparison: 9/30/2019       Findings: The lungs are well expanded. No focal consolidation, pleural   effusion, or pneumothorax. The cardiomediastinal silhouette is unremarkable. The visualized osseous structures are unremarkable. Medical Decision Making     Differential Diagnosis: ACS, arrhythmia, electrolyte derangement, vasovagal syncope    I reviewed the vital signs, available nursing notes, past medical history, past surgical history, family history and social history and old medical records. On my interpretation, Laboratory workup is significant for unremarkable CBC, hemoglobin is 11.3, but normal platelets and no elevation white blood cell count, no abnormalities on electrolytes, troponin negative on my interpretation of the radiology studies chest x-ray without acute abnormality  On my interpretation of the EKG normal sinus rhythm at a rate of 65, QTC is 443, no elevation or depression    Management/ED course: Patient presents today after an episode of presyncope in the shower. She has been evaluated for this before.   She had no significant abnormalities in her vital signs, and has an unremarkable work-up without ischemic changes on EKG, normal electrolytes, negative troponin. She had resolution of her symptoms, and feels comfortable with going home. We discussed that this may be vasovagal syncope related to the hot shower and taking her medications right before. Ultimately discharged to follow-up with her primary care provider. Dispo: Discharged. The patient has been re-evaluated and is ready for discharge. Reviewed available results with patient. Counseled patient on diagnosis and care plan. Patient has expressed understanding, and all questions have been answered. Patient agrees with plan and agrees to follow up as recommended, or to return to the ED if their symptoms worsen. Discharge instructions have been provided and explained to the patient, along with reasons to return to the ED. PLAN:  Discharge Medication List as of 2/23/2020 12:54 PM        2. Follow-up Information     Follow up With Specialties Details Why Contact Info    Saima Khan MD Family Practice  As needed [address]  [phone]          3. Return to ED if worse     Diagnosis     Clinical Impression:   1. Near syncope    2.  Acute midline thoracic back pain        Attestations:    Juan Graff MD

## 2020-02-23 NOTE — ED NOTES
Pt discharged by Melanie Castillo MD.  Pt provided with discharge instructions Rx and instructions on follow up care. Pt out of ED ambulatory accompanied by spouse.

## 2020-02-23 NOTE — ED NOTES
Pt arrived via EMS from home. Pt states that after she got out of the shower she started to feel weak and started to feel sweaty. Pt also states she felt like vomiting and had pain in her upper back when this happened. Pt has had previous episodes like this and was evaluated for it. Pt denies SOB, CP at this time. EMS placed 20 G IV  In RT AC. EKG completed at bedside. Awaiting MD evaluation.

## 2020-03-05 ENCOUNTER — HOSPITAL ENCOUNTER (OUTPATIENT)
Dept: MAMMOGRAPHY | Age: 75
Discharge: HOME | End: 2020-03-05
Attending: INTERNAL MEDICINE
Payer: MEDICARE

## 2020-03-05 DIAGNOSIS — Z78.0 POST-MENOPAUSAL: ICD-10-CM

## 2020-03-05 DIAGNOSIS — Z79.811 AROMATASE INHIBITOR USE: ICD-10-CM

## 2020-03-05 PROCEDURE — 77080 DXA BONE DENSITY AXIAL: CPT

## 2020-03-06 ENCOUNTER — TELEPHONE (OUTPATIENT)
Dept: ONCOLOGY | Age: 75
End: 2020-03-06

## 2020-03-06 NOTE — TELEPHONE ENCOUNTER
----- Message from Christin Mayen MD sent at 3/5/2020  4:39 PM EST -----  Dental care. Start Zoledronic acid.

## 2020-03-06 NOTE — TELEPHONE ENCOUNTER
Spoke with pt. HIPAA verified by two patient identifiers. Pt does not want to agree to taking this until she speaks to her PCP, she doesn't want issues with her jaw, doesn't want to take the letrozole if it causes this, I gave her information with pros/cons and I advised her I will forward her results to her PCP. Pt will  call us back with an update.

## 2020-03-13 ENCOUNTER — TELEPHONE (OUTPATIENT)
Dept: ONCOLOGY | Age: 75
End: 2020-03-13

## 2020-03-13 NOTE — TELEPHONE ENCOUNTER
Please give pt a call back regarding her upcoming infusion pt thinks she dosen't need it.  Best contact [phone]

## 2020-03-13 NOTE — TELEPHONE ENCOUNTER
As of my last phone call pt said she was gong to speak to her PCP about this. We have not ordered this for pt. I called pt, no answer, left detailed VM.

## 2020-05-01 DIAGNOSIS — D05.01 NEOPLASM OF RIGHT BREAST, PRIMARY TUMOR STAGING CATEGORY TIS: LOBULAR CARCINOMA IN SITU (LCIS): ICD-10-CM

## 2020-05-01 RX ORDER — LETROZOLE 2.5 MG/1
TABLET, FILM COATED ORAL
Qty: 30 TAB | Refills: 0 | Status: SHIPPED | OUTPATIENT
Start: 2020-05-01 | End: 2020-06-01

## 2020-06-01 DIAGNOSIS — D05.01 NEOPLASM OF RIGHT BREAST, PRIMARY TUMOR STAGING CATEGORY TIS: LOBULAR CARCINOMA IN SITU (LCIS): ICD-10-CM

## 2020-06-01 RX ORDER — LETROZOLE 2.5 MG/1
TABLET, FILM COATED ORAL
Qty: 30 TAB | Refills: 0 | Status: SHIPPED | OUTPATIENT
Start: 2020-06-01 | End: 2020-06-30

## 2020-06-30 DIAGNOSIS — D05.01 NEOPLASM OF RIGHT BREAST, PRIMARY TUMOR STAGING CATEGORY TIS: LOBULAR CARCINOMA IN SITU (LCIS): ICD-10-CM

## 2020-06-30 RX ORDER — LETROZOLE 2.5 MG/1
TABLET, FILM COATED ORAL
Qty: 30 TAB | Refills: 0 | Status: SHIPPED | OUTPATIENT
Start: 2020-06-30 | End: 2020-08-03

## 2020-08-03 ENCOUNTER — VIRTUAL VISIT (OUTPATIENT)
Dept: ONCOLOGY | Age: 75
End: 2020-08-03

## 2020-08-03 DIAGNOSIS — D05.01 NEOPLASM OF RIGHT BREAST, PRIMARY TUMOR STAGING CATEGORY TIS: LOBULAR CARCINOMA IN SITU (LCIS): Primary | ICD-10-CM

## 2020-08-03 NOTE — PROGRESS NOTES
Maria Guadalupe Selby is a 66 y.o. female here for follow up for:  Chief Complaint   Patient presents with    Breast Cancer     R    Medication Evaluation     Letrozole     1. Have you been to the ER, urgent care clinic since your last visit? Hospitalized since your last visit? [facility] (sciatica)    2. Have you seen or consulted any other health care providers outside of the [address] [place] [address] since your last visit? Include any pap smears or colon screening. no    Pt states everything has been fine since last visit. No concerns brought up.

## 2020-09-27 ENCOUNTER — HOSPITAL ENCOUNTER (OUTPATIENT)
Dept: MRI IMAGING | Age: 75
Discharge: HOME | End: 2020-09-27
Attending: ORTHOPAEDIC SURGERY
Payer: MEDICARE

## 2020-09-27 DIAGNOSIS — M47.26 OSTEOARTHRITIS OF SPINE WITH RADICULOPATHY, LUMBAR REGION: ICD-10-CM

## 2020-09-27 DIAGNOSIS — M54.50 LOW BACK PAIN, UNSPECIFIED BACK PAIN LATERALITY, UNSPECIFIED CHRONICITY, UNSPECIFIED WHETHER SCIATICA PRESENT: ICD-10-CM

## 2020-09-27 DIAGNOSIS — M54.32 LEFT SIDED SCIATICA: ICD-10-CM

## 2020-09-27 PROCEDURE — 72148 MRI LUMBAR SPINE W/O DYE: CPT

## 2020-10-19 ENCOUNTER — OFFICE VISIT (OUTPATIENT)
Dept: SURGERY | Age: 75
End: 2020-10-19
Payer: MEDICARE

## 2020-10-19 VITALS
BODY MASS INDEX: 24.84 KG/M2 | HEART RATE: 75 BPM | HEIGHT: 62 IN | DIASTOLIC BLOOD PRESSURE: 76 MMHG | SYSTOLIC BLOOD PRESSURE: 180 MMHG | WEIGHT: 135 LBS | TEMPERATURE: 97.5 F

## 2020-10-19 DIAGNOSIS — Z85.3 HISTORY OF BREAST CANCER: ICD-10-CM

## 2020-10-19 DIAGNOSIS — D05.01 NEOPLASM OF RIGHT BREAST, PRIMARY TUMOR STAGING CATEGORY TIS: LOBULAR CARCINOMA IN SITU (LCIS): Primary | ICD-10-CM

## 2020-10-19 PROCEDURE — 99213 OFFICE O/P EST LOW 20 MIN: CPT | Performed by: NURSE PRACTITIONER

## 2020-10-19 PROCEDURE — G8399 PT W/DXA RESULTS DOCUMENT: HCPCS | Performed by: NURSE PRACTITIONER

## 2020-10-19 PROCEDURE — G8536 NO DOC ELDER MAL SCRN: HCPCS | Performed by: NURSE PRACTITIONER

## 2020-10-19 PROCEDURE — G8427 DOCREV CUR MEDS BY ELIG CLIN: HCPCS | Performed by: NURSE PRACTITIONER

## 2020-10-19 PROCEDURE — 1101F PT FALLS ASSESS-DOCD LE1/YR: CPT | Performed by: NURSE PRACTITIONER

## 2020-10-19 PROCEDURE — G8432 DEP SCR NOT DOC, RNG: HCPCS | Performed by: NURSE PRACTITIONER

## 2020-10-19 PROCEDURE — 1090F PRES/ABSN URINE INCON ASSESS: CPT | Performed by: NURSE PRACTITIONER

## 2020-10-19 PROCEDURE — G8420 CALC BMI NORM PARAMETERS: HCPCS | Performed by: NURSE PRACTITIONER

## 2020-10-19 NOTE — PATIENT INSTRUCTIONS

## 2020-10-19 NOTE — PROGRESS NOTES
HISTORY OF PRESENT ILLNESS  Kaylee Burrell is a 66 y.o. female. HPI ESTABLISHED patient here for follow up RIGHT breast cancer. No breast problems to report. Taking Letrozole.      Breast history-  Referring - Dr. René Yoder  2/11/19 - RIGHT breast biopsy - 69 yo female with right breast DCIS, grade 2 Stage 0 Er 100%  3/28/19 - RIGHT breast lumpectomy. LCIS. No DCIS.  - Dr. Anupama Sams  4/2019- started Letrozole - Dr. Ricky Joseph     Family history-   Maternal cousin had breast cancer (unsure what age). Father had colon cancer. OB History    No obstetric history on file. Obstetric Comments   Menarche:  13. LMP: 1980's. # of Children:  2. Age at Delivery of First Child:  19.   Hysterectomy/oophorectomy:  YES/YES. Breast Bx:  yes. Hx of Breast Feeding:  no. BCP:  yes. Hormone therapy:  yes.                   Past Surgical History:   Procedure Laterality Date    ABDOMEN SURGERY PROC UNLISTED  7/5/2013    Feeding tube placement ( has been removed)    HX BREAST BIOPSY Left     early [de-identified]    HX BREAST LUMPECTOMY Right 3/28/2019    RIGHT BREAST LUMPECTOMY WITH ULTRASOUND performed by Nuvia Rios MD at [facility] AMBULATORY OR    HX GI  1980's    gastric ulcer excision (1/3 stomach removed)    HX HEENT  2013    gum surgery    HX ORTHOPAEDIC      broken wrist    HX OTHER SURGICAL  2013    left arm veins/tissue used for oral/facial reconstruction    HX PELON AND BSO      HX TRACHEOSTOMY  2013    removed    HX TYMPANOSTOMY Bilateral 2015    tubes    [place] EGD INSERT GUIDE WIRE DILATOR PASSAGE ESOPHAGUS  8/21/2017         UPPER GI ENDOSCOPY,WM RODRIGUES,30MM  10/26/2016         UPPER GI ENDOSCOPY,DILATN W GUIDE  2/27/2017         UPPER GI ENDOSCOPY,DILATN W GUIDE  3/27/2017         UPPER GI ENDOSCOPY,DILATN W GUIDE  4/12/2017         UPPER GI ENDOSCOPY,DILATN W GUIDE  5/1/2017         UPPER GI ENDOSCOPY,DILATN W GUIDE  10/2/2017         UPPER GI ENDOSCOPY,DILATN W GUIDE  11/10/2017            [facility] Results (most recent):  Results from [place] encounter on 01/24/20   ARIAN 3D KAYA W MAMMO BI DX INCL CAD    Narrative INDICATION:  history of DCIS/LCIS. 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   COMPARISON: Mammogram(s), most recent, 1/28/2019. Ovidio Arcos COMPOSITION:   There are scattered fibroglandular densities. .  TECHNIQUE:   Standard 2D, CC and MLO views of the each breast as well as spot magnification  views of the right breast were performed with digital technique and subjected to  computer-aided detection. Tomosynthesis of each breast was performed in CC and  MLO projections. Ovidio Arcos FINDINGS:    Post excisional biopsy changes in the right breast superiorly. No new visualized mass, suspicious microcalcification or architectural  distortion. .    Impression IMPRESSION:     1. BI-RADS Category 2 - Benign findings. .  RECOMMENDATION:    Routine surveillance in this high-risk patient         ROS    Physical Exam  Constitutional:       Appearance: Normal appearance. Chest:      Breasts:         Right: No mass, nipple discharge, skin change or tenderness. Left: No mass, nipple discharge, skin change or tenderness. Musculoskeletal: Normal range of motion. Comments: UE x 2   Lymphadenopathy:      Upper Body:      Right upper body: No supraclavicular or axillary adenopathy. Left upper body: No supraclavicular or axillary adenopathy. Neurological:      Mental Status: She is alert. Psychiatric:         Attention and Perception: Attention normal.         Mood and Affect: Mood normal.         Speech: Speech normal.         Behavior: Behavior normal.       Visit Vitals  BP (!) 180/76 (BP 1 Location: Right arm, BP Patient Position: Sitting)   Pulse 75   Temp 97.5 °F (36.4 °C) (Skin)   Ht 5' 2\" (1.575 m)   Wt 135 lb (61.2 kg)   BMI 24.69 kg/m²       ASSESSMENT and PLAN  Encounter Diagnoses   Name Primary?     Neoplasm of right breast, primary tumor staging category Tis: lobular carcinoma in situ (LCIS) Yes    History of breast cancer         Normal exam with no evidence of local recurrence. Continues on AI and has follow-up with med onc. BDmammogram 3D in 1/2021. RTC in 1 year or sooner PRN. She is comfortable with this plan. All questions answered and she stated understanding.

## 2021-01-05 ENCOUNTER — TELEPHONE (OUTPATIENT)
Dept: ONCOLOGY | Age: 76
End: 2021-01-05

## 2021-01-05 DIAGNOSIS — D05.01 NEOPLASM OF RIGHT BREAST, PRIMARY TUMOR STAGING CATEGORY TIS: LOBULAR CARCINOMA IN SITU (LCIS): ICD-10-CM

## 2021-01-05 RX ORDER — LETROZOLE 2.5 MG/1
TABLET, FILM COATED ORAL
Qty: 30 TAB | Refills: 0 | Status: SHIPPED | OUTPATIENT
Start: 2021-01-05 | End: 2021-02-04

## 2021-01-05 NOTE — TELEPHONE ENCOUNTER
PER JUAN from Dr. Keyshawn Acuna LETROZOLE 2.5MG [facility]) ONE TAB ONCE A DAY QUANTITY 30 REFILL 0. Pt needs an appointment with us by end of the month.

## 2021-01-05 NOTE — TELEPHONE ENCOUNTER
Attempted to reach patient to schedule appointment with Dr Adi Bunn within a month. No answer.   Left message requesting a return call

## 2021-01-25 ENCOUNTER — HOSPITAL ENCOUNTER (OUTPATIENT)
Dept: MAMMOGRAPHY | Age: 76
Discharge: HOME | End: 2021-01-25
Attending: NURSE PRACTITIONER
Payer: MEDICARE

## 2021-01-25 DIAGNOSIS — D05.01 NEOPLASM OF RIGHT BREAST, PRIMARY TUMOR STAGING CATEGORY TIS: LOBULAR CARCINOMA IN SITU (LCIS): ICD-10-CM

## 2021-01-25 DIAGNOSIS — Z85.3 HISTORY OF BREAST CANCER: ICD-10-CM

## 2021-01-25 PROCEDURE — 77062 BREAST TOMOSYNTHESIS BI: CPT

## 2021-02-02 NOTE — PROGRESS NOTES
Zacarias Ward is a 64 y.o. female here for 6 month follow up for LCIS breast cancer. She is on Letrozole. 1. Have you been to the ER, urgent care clinic since your last visit? Hospitalized since your last visit? no    2. Have you seen or consulted any other health care providers outside of the [address] [place] [address] since your last visit? Include any pap smears or colon screening. Dr Iraida Salas for sciatica    Pt states she has been doing well. No complaints. She needs refill of Letrozole. No problems with Letrozole.

## 2021-02-04 ENCOUNTER — OFFICE VISIT (OUTPATIENT)
Dept: ONCOLOGY | Age: 76
End: 2021-02-04
Payer: MEDICARE

## 2021-02-04 VITALS
SYSTOLIC BLOOD PRESSURE: 151 MMHG | HEIGHT: 62 IN | DIASTOLIC BLOOD PRESSURE: 73 MMHG | OXYGEN SATURATION: 95 % | WEIGHT: 140 LBS | HEART RATE: 76 BPM | BODY MASS INDEX: 25.76 KG/M2 | TEMPERATURE: 98.4 F

## 2021-02-04 DIAGNOSIS — Z79.811 AROMATASE INHIBITOR USE: ICD-10-CM

## 2021-02-04 DIAGNOSIS — D05.01 NEOPLASM OF RIGHT BREAST, PRIMARY TUMOR STAGING CATEGORY TIS: LOBULAR CARCINOMA IN SITU (LCIS): Primary | ICD-10-CM

## 2021-02-04 PROCEDURE — G8536 NO DOC ELDER MAL SCRN: HCPCS | Performed by: INTERNAL MEDICINE

## 2021-02-04 PROCEDURE — 99213 OFFICE O/P EST LOW 20 MIN: CPT | Performed by: INTERNAL MEDICINE

## 2021-02-04 PROCEDURE — G0463 HOSPITAL OUTPT CLINIC VISIT: HCPCS | Performed by: INTERNAL MEDICINE

## 2021-02-04 PROCEDURE — 1090F PRES/ABSN URINE INCON ASSESS: CPT | Performed by: INTERNAL MEDICINE

## 2021-02-04 PROCEDURE — G8510 SCR DEP NEG, NO PLAN REQD: HCPCS | Performed by: INTERNAL MEDICINE

## 2021-02-04 PROCEDURE — 1101F PT FALLS ASSESS-DOCD LE1/YR: CPT | Performed by: INTERNAL MEDICINE

## 2021-02-04 PROCEDURE — G8427 DOCREV CUR MEDS BY ELIG CLIN: HCPCS | Performed by: INTERNAL MEDICINE

## 2021-02-04 PROCEDURE — G8419 CALC BMI OUT NRM PARAM NOF/U: HCPCS | Performed by: INTERNAL MEDICINE

## 2021-02-04 PROCEDURE — G8399 PT W/DXA RESULTS DOCUMENT: HCPCS | Performed by: INTERNAL MEDICINE

## 2021-02-04 RX ORDER — LETROZOLE 2.5 MG/1
2.5 TABLET, FILM COATED ORAL DAILY
Qty: 30 TAB | Refills: 5 | Status: SHIPPED | OUTPATIENT
Start: 2021-02-04 | End: 2022-02-24

## 2021-02-04 RX ORDER — LETROZOLE 2.5 MG/1
2.5 TABLET, FILM COATED ORAL DAILY
Qty: 30 TAB | Refills: 5 | Status: SHIPPED | OUTPATIENT
Start: 2021-02-04 | End: 2021-02-04 | Stop reason: SDUPTHER

## 2021-02-04 NOTE — LETTER
2/4/2021 Patient: Ponce Valentin YOB: 1945 Date of Visit: 2/4/2021 Tonio Garcia MD 
[address] [address] Via Fax: [phone] Dear Tonio Garcia MD, Thank you for referring Ms. Jorge Murillo to [address] [place] [address] for evaluation. My notes for this consultation are attached. If you have questions, please do not hesitate to call me. I look forward to following your patient along with you.  
 
 
Sincerely, 
 
Whitley Chaudhary MD

## 2021-02-04 NOTE — PROGRESS NOTES
PER JUAN from Dr. Gia Barone LETROZOLE 2.5MG [facility]) ONE TAB ONCE A DAY QUANTITY 30 REFILL 5

## 2021-02-04 NOTE — PROGRESS NOTES
[address], [address] [place] [address] Jasvir Barrios, [address] [facility]  [phone]        Oncology Progress Note        Patient: Arcelia Can MRN: [id number]  SSN: [id number]    YOB: 1945  Age: 64 y.o. Sex: female        Diagnosis:     1. Right breast LCIS    Nuclear grade 2  %    Treatment:     1. Letrozole 2.5 mg daily  2. S/p lumpectomy 3/28/2019 - Dr. Vicki Packer    Subjective:      Arcelia Can is a 64 y.o. female who I see for right breast LCIS. She underwent a screening mammogram and was noted to have suspicious calcification at 12 O'clock in the right breast. A biopsy of the area revealed DCIS. She then underwent a lumpectomy on 03/28/2019. The final pathology was read at [facility], ER +ve. She is taking Letrozole without side effects. Ms. Cecy Almonte says she is doing well and does not have any new complaints. She had a follow up with breast NP in October 2020. Review of Systems:    Constitutional: negative  Eyes: negative  Ears, Nose, Mouth, Throat, and Face: negative  Respiratory: negative  Cardiovascular: negative  Gastrointestinal: negative  Genitourinary:negative  Integument/Breast: negative  Hematologic/Lymphatic: negative  Musculoskeletal:negative  Neurological: negative    ROS was pulled in from a previous visit. No changes were made d/t symptoms remain the same.       Past Medical History:   Diagnosis Date    Breast cancer, right ([facility] 2019/3    rt lumpectomy -no radiation or chemo, taking pill-estorgen blocker    Diet-controlled type 2 diabetes mellitus (HCC)     GERD (gastroesophageal reflux disease)     Gum cancer ([facility] 2013    surgery / radiation    Hypertension     Hypothyroid     Ill-defined condition     no venipuncture left arm--hx skin graft    Overactive bladder     PUD (peptic ulcer disease)      Past Surgical History:   Procedure Laterality Date    HX BREAST BIOPSY Left     early 80's  HX BREAST LUMPECTOMY Right 3/28/2019    RIGHT BREAST LUMPECTOMY WITH ULTRASOUND performed by Dariusz Baron MD at [facility] AMBULATORY OR    HX GI      gastric ulcer excision (1/3 stomach removed)    HX HEENT      gum surgery    HX ORTHOPAEDIC      broken wrist    HX OTHER SURGICAL      left arm veins/tissue used for oral/facial reconstruction    HX PELON AND BSO      HX TRACHEOSTOMY      removed    HX TYMPANOSTOMY Bilateral 2015    tubes    [place] ABDOMEN SURGERY PROC UNLISTED  2013    Feeding tube placement ( has been removed)    [place] EGD INSERT GUIDE WIRE DILATOR PASSAGE ESOPHAGUS  2017         UPPER GI ENDOSCOPY,BALL DIL,30MM  10/26/2016         UPPER GI ENDOSCOPY,DILATN W GUIDE  2017         UPPER GI ENDOSCOPY,DILATN W GUIDE  3/27/2017         UPPER GI ENDOSCOPY,DILATN W GUIDE  2017         UPPER GI ENDOSCOPY,DILATN W GUIDE  2017         UPPER GI ENDOSCOPY,DILATN W GUIDE  10/2/2017         UPPER GI ENDOSCOPY,DILATN W GUIDE  11/10/2017           Family History   Problem Relation Age of Onset    Cancer Father         colon    Hypertension Father     Stroke Father     Kidney Disease Mother     Breast Cancer Other     Anesth Problems Neg Hx      Social History     Tobacco Use    Smoking status: Former Smoker     Quit date: 1980     Years since quittin.3    Smokeless tobacco: Never Used   Substance Use Topics    Alcohol use: No      Prior to Admission medications    Medication Sig Start Date End Date Taking? Authorizing Provider   letrozole [facility]) 2.5 mg tablet Take 1 tablet by mouth once daily 21  Yes Johnathon Liu MD   cranberry fruit extract (CRANBERRY PO) Take  by mouth. Yes Provider, Historical   cyanocobalamin (VITAMIN B12) 1,000 mcg/mL injection 1,000 mcg by IntraMUSCular route once. Yes Provider, Historical   vit a,c & e-lutein-minerals (OCUVITE) tablet Take 1 Tab by mouth daily.    Yes Provider, Historical   CANNABIDIOL, CBD, EXTRACT PO Take 8 Drops by mouth two (2) times a day. Yes Provider, Historical   peg 400-propylene glycol (SYSTANE, PROPYLENE GLYCOL,) 0.4-0.3 % drop Apply 1 Drop to eye as needed. Both eyes   Yes Provider, Historical   atorvastatin (LIPITOR) 20 mg tablet Take 20 mg by mouth nightly. Yes Provider, Historical   levothyroxine (SYNTHROID) 75 mcg tablet Take 75 mcg by mouth Daily (before breakfast). Yes Provider, Historical   acetaminophen (TYLENOL EXTRA STRENGTH) 500 mg tablet Take 1,000 mg by mouth every six (6) hours as needed for Pain. Yes Provider, Historical   MULTIVITAMIN WITH MINERALS (HAIR,SKIN AND NAILS PO) Take 1 Tab by mouth daily. Yes Provider, Historical   temazepam (RESTORIL) 15 mg capsule Take 15 mg by mouth nightly as needed for Sleep. Yes Provider, Historical        No Known Allergies        Objective:     Visit Vitals  BP (!) 151/73 (BP 1 Location: Left upper arm, BP Patient Position: Sitting)   Pulse 76   Temp 98.4 °F (36.9 °C) (Temporal)   Ht 5' 2\" (1.575 m)   Wt 140 lb (63.5 kg)   SpO2 95%   BMI 25.61 kg/m²       Pain Scale: 0 - No pain/10  Pain Location:       Physical Exam:    GENERAL: alert, cooperative, no distress, appears stated age  EYE: conjunctivae/corneas clear  LYMPHATIC: Cervical, supraclavicular, and axillary nodes normal.    LUNG: clear to auscultation bilaterally  HEART: regular rate and rhythm  ABDOMEN: soft, non-tender  BREAST: deferred at this visit. Breast NP visit in October 2020  EXTREMITIES:  extremities normal, atraumatic, no cyanosis or edema  SKIN: Normal.  NEUROLOGIC: AOx3. Gait normal.     Physical exam was pulled in from a previous visit. No changes were made d/t physical exam remains the same. Assessment:     1.  Right breast LCIS    Nuclear grade 2  %    ECOG PS 0  Intent of Treatment - curative  Prognosis - excellent     On letrozole 2.5 mg - started 4/2019  Tolerating treatment well  Asymptomatic  In remission    Mammogram (1/25/2021): no evidence of malignancy. 2. Osteoporosis    Hip and spine looks good. Observation      Plan:       > Continue Letrozole  > mammogram needed 1/2022  > Has a follow up with PATEL Gomez NP 10/25/2021  > Follow-up 1 year    I performed a history and physical examination of the patient and discussed his management with the NPP. I reviewed the NPP note and agree with the documented findings and plan of care. The patient was seen in conjunction with Ms. Haines. Signed by: Jhonny Kenney MD                     February 4, 2021          CC. Fe Onofre MD  CC.  Cate Marino MD

## 2021-10-25 ENCOUNTER — OFFICE VISIT (OUTPATIENT)
Dept: SURGERY | Age: 76
End: 2021-10-25
Payer: MEDICARE

## 2021-10-25 VITALS
WEIGHT: 140 LBS | HEART RATE: 75 BPM | DIASTOLIC BLOOD PRESSURE: 85 MMHG | BODY MASS INDEX: 25.76 KG/M2 | HEIGHT: 62 IN | SYSTOLIC BLOOD PRESSURE: 193 MMHG

## 2021-10-25 DIAGNOSIS — Z85.3 HISTORY OF BREAST CANCER: ICD-10-CM

## 2021-10-25 DIAGNOSIS — D05.01 NEOPLASM OF RIGHT BREAST, PRIMARY TUMOR STAGING CATEGORY TIS: LOBULAR CARCINOMA IN SITU (LCIS): Primary | ICD-10-CM

## 2021-10-25 PROCEDURE — 1101F PT FALLS ASSESS-DOCD LE1/YR: CPT | Performed by: NURSE PRACTITIONER

## 2021-10-25 PROCEDURE — G8536 NO DOC ELDER MAL SCRN: HCPCS | Performed by: NURSE PRACTITIONER

## 2021-10-25 PROCEDURE — G8419 CALC BMI OUT NRM PARAM NOF/U: HCPCS | Performed by: NURSE PRACTITIONER

## 2021-10-25 PROCEDURE — G8399 PT W/DXA RESULTS DOCUMENT: HCPCS | Performed by: NURSE PRACTITIONER

## 2021-10-25 PROCEDURE — G8432 DEP SCR NOT DOC, RNG: HCPCS | Performed by: NURSE PRACTITIONER

## 2021-10-25 PROCEDURE — 1090F PRES/ABSN URINE INCON ASSESS: CPT | Performed by: NURSE PRACTITIONER

## 2021-10-25 PROCEDURE — G8427 DOCREV CUR MEDS BY ELIG CLIN: HCPCS | Performed by: NURSE PRACTITIONER

## 2021-10-25 PROCEDURE — 99213 OFFICE O/P EST LOW 20 MIN: CPT | Performed by: NURSE PRACTITIONER

## 2021-10-25 NOTE — PATIENT INSTRUCTIONS

## 2021-10-25 NOTE — PROGRESS NOTES
HISTORY OF PRESENT ILLNESS  Kaylee Conte is a 64 y.o. female. HPI ESTABLISHED patient here for follow up RIGHT breast cancer. No breast problems to report. Taking Letrozole.      Breast history-  Referring - Dr. Angelena Lombard  2/11/19 - RIGHT breast biopsy - 71 yo female with right breast DCIS, grade 2 Stage 0 Er 100%  3/28/19 - RIGHT breast lumpectomy. LCIS. No DCIS.  - Dr. Severa Marking  4/2019- started Letrozole - Dr. Alyssa Fisher     Family history-   Maternal cousin had breast cancer (unsure what age). Father had colon cancer. OB History    No obstetric history on file. Obstetric Comments   Menarche:  13. LMP: 1980's. # of Children:  2. Age at Delivery of First Child:  19.   Hysterectomy/oophorectomy:  YES/YES. Breast Bx:  yes. Hx of Breast Feeding:  no. BCP:  yes. Hormone therapy:  yes.                     Past Surgical History:   Procedure Laterality Date    HX BREAST BIOPSY Left     early [de-identified]    HX BREAST LUMPECTOMY Right 3/28/2019    RIGHT BREAST LUMPECTOMY WITH ULTRASOUND performed by Luis Myers MD at [facility] AMBULATORY OR    HX GI  1980's    gastric ulcer excision (1/3 stomach removed)    HX HEENT  2013    gum surgery    HX ORTHOPAEDIC      broken wrist    HX OTHER SURGICAL  2013    left arm veins/tissue used for oral/facial reconstruction    HX PELON AND BSO      HX TRACHEOSTOMY  2013    removed    HX TYMPANOSTOMY Bilateral 2015    tubes    [place] ABDOMEN SURGERY PROC UNLISTED  7/5/2013    Feeding tube placement ( has been removed)    [place] EGD INSERT GUIDE WIRE DILATOR PASSAGE ESOPHAGUS  8/21/2017         UPPER GI ENDOSCOPY,WM RODRIGUES,30MM  10/26/2016         UPPER GI ENDOSCOPY,DILATN W GUIDE  2/27/2017         UPPER GI ENDOSCOPY,DILATN W GUIDE  3/27/2017         UPPER GI ENDOSCOPY,DILATN W GUIDE  4/12/2017         UPPER GI ENDOSCOPY,DILATN W GUIDE  5/1/2017         UPPER GI ENDOSCOPY,DILATN W GUIDE  10/2/2017         UPPER GI ENDOSCOPY,DILATN W GUIDE  11/10/2017              [facility] Results (most recent):  [facility]    [facility] 3D KAYA W MAMMO BI DX INCL CAD    Narrative  EXAM:  [facility] 3D KAYA W MAMMO BI DX INCL CAD. INDICATION: Screening. Patient with history of LCIS status post right breast  lumpectomy 3/28/2019. COMPARISON: Prior mammograms 1/24/2020 through 1/6/2014. .    COMPOSITION: There are scattered areas of fibroglandular density. TECHNIQUE: Routine CC and MLO views of each breast and spot magnification  craniocaudal and spot medication mediolateral views of the right breast were  performed with digital technique and interpreted with use of computer-aided  detection. Additionally, tomosynthesis of both breasts in the CC and MLO  projections was performed. FINDINGS: No cemented change in expected appearance of post lumpectomy site in  the right breast. There is no mass, suspicious calcification, distortion, skin  thickening, or nipple retraction. No significant change from the prior study. Impression  No mammographic evidence of malignancy. BI-RADS Assessment  Category 2: Benign finding. RECOMMENDATION:  Routine screening mammogram in one year in the absence of new  or suspicious clinical findings. ROS    Physical Exam  Constitutional:       Appearance: Normal appearance. Chest:      Breasts:         Right: No mass, nipple discharge, skin change (well healed surgical incision) or tenderness. Left: No mass, nipple discharge, skin change or tenderness. Musculoskeletal:      Comments: FROM - UE x 2   Lymphadenopathy:      Upper Body:      Right upper body: No supraclavicular or axillary adenopathy. Left upper body: No supraclavicular or axillary adenopathy. Neurological:      Mental Status: She is alert.    Psychiatric:         Attention and Perception: Attention normal.         Mood and Affect: Mood normal.         Speech: Speech normal.         Behavior: Behavior normal.         Visit Vitals  BP (!) 193/85 (BP 1 Location: Right arm, BP Patient Position: Sitting, BP Cuff Size: Adult)   Pulse 75   Ht 5' 2\" (1.575 m)   Wt 140 lb (63.5 kg)   BMI 25.61 kg/m²         ASSESSMENT and PLAN  Encounter Diagnoses   Name Primary?  Neoplasm of right breast, primary tumor staging category Tis: lobular carcinoma in situ (LCIS) Yes    History of breast cancer         Normal exam with no evidence of local recurrence. Continues on AI and has follow-up with med onc. BDmammogram 3D in 1/2022. RTC in 1 year or sooner PRN. She is comfortable with this plan. All questions answered and she stated understanding. Total time spent for this patient - 20 minutes.

## 2022-01-26 ENCOUNTER — HOSPITAL ENCOUNTER (OUTPATIENT)
Dept: MAMMOGRAPHY | Age: 77
Discharge: HOME | End: 2022-01-26
Attending: NURSE PRACTITIONER
Payer: MEDICARE

## 2022-01-26 DIAGNOSIS — Z85.3 HISTORY OF BREAST CANCER: ICD-10-CM

## 2022-01-26 PROCEDURE — 77062 BREAST TOMOSYNTHESIS BI: CPT

## 2022-03-16 NOTE — PROGRESS NOTES
Ava Sams is a 76 y.o. female here for one year follow up for LCIS breast cancer. She is on Letrozole. She is taking everyday. Pt states she is doing well. No concerns brought up. She had her mammogram in January and was good. BP is elevated today. Pt states it always is at the doctors office. 1. Have you been to the ER, urgent care clinic since your last visit? Hospitalized since your last visit? no    2. Have you seen or consulted any other health care providers outside of the [address] [place] [address] since your last visit? Include any pap smears or colon screening.  Dentist

## 2022-03-17 ENCOUNTER — OFFICE VISIT (OUTPATIENT)
Dept: ONCOLOGY | Age: 77
End: 2022-03-17
Payer: MEDICARE

## 2022-03-17 VITALS
HEART RATE: 65 BPM | OXYGEN SATURATION: 94 % | TEMPERATURE: 98 F | SYSTOLIC BLOOD PRESSURE: 186 MMHG | BODY MASS INDEX: 25.8 KG/M2 | DIASTOLIC BLOOD PRESSURE: 69 MMHG | HEIGHT: 62 IN | WEIGHT: 140.2 LBS

## 2022-03-17 DIAGNOSIS — D05.01 NEOPLASM OF RIGHT BREAST, PRIMARY TUMOR STAGING CATEGORY TIS: LOBULAR CARCINOMA IN SITU (LCIS): Primary | ICD-10-CM

## 2022-03-17 PROCEDURE — 99213 OFFICE O/P EST LOW 20 MIN: CPT | Performed by: INTERNAL MEDICINE

## 2022-03-17 PROCEDURE — G8399 PT W/DXA RESULTS DOCUMENT: HCPCS | Performed by: INTERNAL MEDICINE

## 2022-03-17 PROCEDURE — G8536 NO DOC ELDER MAL SCRN: HCPCS | Performed by: INTERNAL MEDICINE

## 2022-03-17 PROCEDURE — 1101F PT FALLS ASSESS-DOCD LE1/YR: CPT | Performed by: INTERNAL MEDICINE

## 2022-03-17 PROCEDURE — 1090F PRES/ABSN URINE INCON ASSESS: CPT | Performed by: INTERNAL MEDICINE

## 2022-03-17 PROCEDURE — G8427 DOCREV CUR MEDS BY ELIG CLIN: HCPCS | Performed by: INTERNAL MEDICINE

## 2022-03-17 PROCEDURE — G8432 DEP SCR NOT DOC, RNG: HCPCS | Performed by: INTERNAL MEDICINE

## 2022-03-17 PROCEDURE — G8419 CALC BMI OUT NRM PARAM NOF/U: HCPCS | Performed by: INTERNAL MEDICINE

## 2022-03-17 PROCEDURE — G0463 HOSPITAL OUTPT CLINIC VISIT: HCPCS | Performed by: INTERNAL MEDICINE

## 2022-03-17 RX ORDER — ASPIRIN 81 MG/1
TABLET ORAL DAILY
COMMUNITY

## 2022-03-17 NOTE — LETTER
3/17/2022    Patient: Qasim Stokes   YOB: 1945   Date of Visit: 3/17/2022     Naeem Erwin MD  [address] [facility] [address]  [address] [address]  Via Fax: [phone]    Dear Naeem Erwin MD,      Thank you for referring Ms. Yaya Cox to [address] [place] [address] for evaluation. My notes for this consultation are attached. If you have questions, please do not hesitate to call me. I look forward to following your patient along with you.       Sincerely,    Dallin Hernadez MD

## 2022-03-17 NOTE — PROGRESS NOTES
2001 [facility] [address], [address] [facility], [address] [place] [address]  [phone]      Oncology Progress Note        Patient: Toan Moncada MRN: [id number]  SSN: [id number]    YOB: 1945  Age: 76 y.o. Sex: female        Diagnosis:     1. Right breast LCIS - Dx: 2/2019    Nuclear grade 2  %    Treatment:     1. Letrozole 2.5 mg daily  2. S/p lumpectomy 3/28/2019 - Dr. Adarsh Hidalgo    Subjective:      Toan Moncada is a 76 y.o. female who I see for right breast LCIS. She underwent a screening mammogram and was noted to have suspicious calcification at 12 O'clock in the right breast. A biopsy of the area revealed DCIS. She then underwent a lumpectomy on 03/28/2019. The final pathology was read at [facility], ER +ve. She is taking Letrozole without side effects. Ms. Usman Ward says she is doing well and does not have any new complaints. She had a follow up with breast NP in October 2021. She continues to go to the [facility] 4 times a week. Review of Systems:     Constitutional: negative  Eyes: negative  Ears, Nose, Mouth, Throat, and Face: negative  Respiratory: negative  Cardiovascular: negative  Gastrointestinal: negative  Genitourinary:negative  Integument/Breast: negative  Hematologic/Lymphatic: negative  Musculoskeletal:negative  Neurological: negative    Review of systems was reviewed and updated as needed on 03/17/22.       Past Medical History:   Diagnosis Date    Breast cancer, right ([address] 2019/3    rt lumpectomy -no radiation or chemo, taking pill-estorgen blocker    Diet-controlled type 2 diabetes mellitus ([address]     GERD (gastroesophageal reflux disease)     Gum cancer ([address] 2013    surgery / radiation    Hypertension     Hypothyroid     Ill-defined condition     no venipuncture left arm--hx skin graft    Menopause     Overactive bladder     PUD (peptic ulcer disease)      Past Surgical History:   Procedure Laterality Date    HX BREAST BIOPSY Left     early [de-identified]    HX BREAST LUMPECTOMY Right 3/28/2019    RIGHT BREAST LUMPECTOMY WITH ULTRASOUND performed by Ron MD Tk at [facility] AMBULATORY OR    HX GI      gastric ulcer excision (1/3 stomach removed)    HX HEENT      gum surgery    HX OOPHORECTOMY      HX ORTHOPAEDIC      broken wrist    HX OTHER SURGICAL      left arm veins/tissue used for oral/facial reconstruction    HX PELON AND BSO      HX TRACHEOSTOMY      removed    HX TYMPANOSTOMY Bilateral 2015    tubes    [place] ABDOMEN SURGERY PROC UNLISTED  2013    Feeding tube placement ( has been removed)    [place] EGD INSERT GUIDE WIRE DILATOR PASSAGE ESOPHAGUS  2017         UPPER GI ENDOSCOPY,BALL DIL,30MM  10/26/2016         UPPER GI ENDOSCOPY,DILATN W GUIDE  2017         UPPER GI ENDOSCOPY,DILATN W GUIDE  3/27/2017         UPPER GI ENDOSCOPY,DILATN W GUIDE  2017         UPPER GI ENDOSCOPY,DILATN W GUIDE  2017         UPPER GI ENDOSCOPY,DILATN W GUIDE  10/2/2017         UPPER GI ENDOSCOPY,DILATN W GUIDE  11/10/2017           Family History   Problem Relation Age of Onset    Cancer Father         colon    Hypertension Father     Stroke Father     Kidney Disease Mother     Breast Cancer Other     Anesth Problems Neg Hx      Social History     Tobacco Use    Smoking status: Former Smoker     Quit date: 1980     Years since quittin.4    Smokeless tobacco: Never Used   Substance Use Topics    Alcohol use: No      Prior to Admission medications    Medication Sig Start Date End Date Taking? Authorizing Provider   aspirin delayed-release 81 mg tablet Take  by mouth daily. Yes Provider, Historical   letrozole [facility]) 2.5 mg tablet Take 1 tablet by mouth once daily 22  Yes Brandy Garrido MD   cyanocobalamin (VITAMIN B12) 1,000 mcg/mL injection 1,000 mcg by IntraMUSCular route once.    Yes Provider, Historical   vit a,c & e-lutein-minerals ([place]) tablet Take 1 Tab by mouth daily. Yes Provider, Historical   CANNABIDIOL, CBD, EXTRACT PO Take 8 Drops by mouth two (2) times a day. Yes Provider, Historical   peg 400-propylene glycol (SYSTANE, PROPYLENE GLYCOL,) 0.4-0.3 % drop Apply 1 Drop to eye as needed. Both eyes   Yes Provider, Historical   atorvastatin (LIPITOR) 20 mg tablet Take 20 mg by mouth nightly. Yes Provider, Historical   levothyroxine (SYNTHROID) 75 mcg tablet Take 75 mcg by mouth Daily (before breakfast). Yes Provider, Historical   acetaminophen (TYLENOL EXTRA STRENGTH) 500 mg tablet Take 1,000 mg by mouth every six (6) hours as needed for Pain. Yes Provider, Historical   MULTIVITAMIN WITH MINERALS (HAIR,SKIN AND NAILS PO) Take 1 Tab by mouth daily. Yes Provider, Historical   temazepam (RESTORIL) 15 mg capsule Take 15 mg by mouth nightly as needed for Sleep. Yes Provider, Historical   cranberry fruit extract (CRANBERRY PO) Take  by mouth. Patient not taking: Reported on 3/17/2022    Provider, Historical        No Known Allergies        Objective:     Visit Vitals  BP (!) 186/69 (BP 1 Location: Left upper arm, BP Patient Position: Sitting)   Pulse 65   Temp 98 °F (36.7 °C) (Temporal)   Ht 5' 2\" (1.575 m)   Wt 140 lb 3.2 oz (63.6 kg)   SpO2 94%   BMI 25.64 kg/m²     Pain Scale: 0 - No pain/10    Physical Exam:    GENERAL: alert, cooperative, no distress, appears stated age  EYE: conjunctivae/corneas clear  LYMPHATIC: Cervical, supraclavicular, and axillary nodes normal.    LUNG: clear to auscultation bilaterally  HEART: regular rate and rhythm  ABDOMEN: soft, non-tender  BREAST: deferred at this visit. Breast NP visit in October 2020  EXTREMITIES:  extremities normal, atraumatic, no cyanosis or edema  SKIN: Normal.  NEUROLOGIC: AOx3. Gait normal.     The above physical exam was reviewed and updated as needed on 03/17/22. Assessment:     1.  Right breast LCIS    Nuclear grade 2  %    ECOG PS 0  Intent of Treatment - curative  Prognosis - excellent     On letrozole 2.5 mg - started 4/2019  Tolerating treatment well  Asymptomatic  In remission    Mammogram (1/26/2022): no evidence of malignancy. 2. Osteoporosis    Hip and spine looks good. Observation      Plan:       > Continue Letrozole  > mammogram needed 1/2023  > Has a follow up with PATEL Vang NP 10/24/2022  > Follow-up 1 year      Signed by: Lynn Canas NP                     March 17, 2022       I personally saw and evaluated the patient and performed the key components of medical decision making. The history, physical exam, and documentation were performed by Juan Hercules NP. I reviewed and verified the above documentation and modified it as needed. Signed by: Fabricio Chakraborty MD                     March 17, 2022      CC. Yana Rubio MD  CC.  Cherie Ray MD

## 2022-03-20 PROBLEM — D05.01 NEOPLASM OF RIGHT BREAST, PRIMARY TUMOR STAGING CATEGORY TIS: LOBULAR CARCINOMA IN SITU (LCIS): Status: ACTIVE | Noted: 2019-04-18

## 2022-05-05 DIAGNOSIS — Z79.811 AROMATASE INHIBITOR USE: ICD-10-CM

## 2022-05-05 DIAGNOSIS — D05.01 NEOPLASM OF RIGHT BREAST, PRIMARY TUMOR STAGING CATEGORY TIS: LOBULAR CARCINOMA IN SITU (LCIS): ICD-10-CM

## 2022-05-05 RX ORDER — LETROZOLE 2.5 MG/1
TABLET, FILM COATED ORAL
Qty: 30 TABLET | Refills: 0 | Status: SHIPPED | OUTPATIENT
Start: 2022-05-05 | End: 2022-06-06

## 2022-06-06 DIAGNOSIS — Z79.811 AROMATASE INHIBITOR USE: ICD-10-CM

## 2022-06-06 DIAGNOSIS — D05.01 NEOPLASM OF RIGHT BREAST, PRIMARY TUMOR STAGING CATEGORY TIS: LOBULAR CARCINOMA IN SITU (LCIS): ICD-10-CM

## 2022-06-06 RX ORDER — LETROZOLE 2.5 MG/1
TABLET, FILM COATED ORAL
Qty: 30 TABLET | Refills: 0 | Status: SHIPPED | OUTPATIENT
Start: 2022-06-06 | End: 2022-07-01

## 2022-06-30 DIAGNOSIS — Z79.811 AROMATASE INHIBITOR USE: ICD-10-CM

## 2022-06-30 DIAGNOSIS — D05.01 NEOPLASM OF RIGHT BREAST, PRIMARY TUMOR STAGING CATEGORY TIS: LOBULAR CARCINOMA IN SITU (LCIS): ICD-10-CM

## 2022-07-01 RX ORDER — LETROZOLE 2.5 MG/1
TABLET, FILM COATED ORAL
Qty: 30 TABLET | Refills: 0 | Status: SHIPPED | OUTPATIENT
Start: 2022-07-01 | End: 2022-07-26

## 2022-10-31 ENCOUNTER — OFFICE VISIT (OUTPATIENT)
Dept: SURGERY | Age: 77
End: 2022-10-31
Payer: MEDICARE

## 2022-10-31 DIAGNOSIS — Z98.890 S/P LUMPECTOMY OF BREAST: ICD-10-CM

## 2022-10-31 DIAGNOSIS — D05.01 NEOPLASM OF RIGHT BREAST, PRIMARY TUMOR STAGING CATEGORY TIS: LOBULAR CARCINOMA IN SITU (LCIS): Primary | ICD-10-CM

## 2022-10-31 DIAGNOSIS — Z85.3 HISTORY OF BREAST CANCER: ICD-10-CM

## 2022-10-31 PROCEDURE — G8427 DOCREV CUR MEDS BY ELIG CLIN: HCPCS | Performed by: NURSE PRACTITIONER

## 2022-10-31 PROCEDURE — 99213 OFFICE O/P EST LOW 20 MIN: CPT | Performed by: NURSE PRACTITIONER

## 2022-10-31 PROCEDURE — 1123F ACP DISCUSS/DSCN MKR DOCD: CPT | Performed by: NURSE PRACTITIONER

## 2022-10-31 PROCEDURE — 1101F PT FALLS ASSESS-DOCD LE1/YR: CPT | Performed by: NURSE PRACTITIONER

## 2022-10-31 PROCEDURE — G8536 NO DOC ELDER MAL SCRN: HCPCS | Performed by: NURSE PRACTITIONER

## 2022-10-31 PROCEDURE — G8432 DEP SCR NOT DOC, RNG: HCPCS | Performed by: NURSE PRACTITIONER

## 2022-10-31 PROCEDURE — 1090F PRES/ABSN URINE INCON ASSESS: CPT | Performed by: NURSE PRACTITIONER

## 2022-10-31 PROCEDURE — G8417 CALC BMI ABV UP PARAM F/U: HCPCS | Performed by: NURSE PRACTITIONER

## 2022-10-31 PROCEDURE — G8399 PT W/DXA RESULTS DOCUMENT: HCPCS | Performed by: NURSE PRACTITIONER

## 2022-10-31 NOTE — PROGRESS NOTES
HISTORY OF PRESENT ILLNESS  Delma Merribeth Sacks is a 76 y.o. female. HPI ESTABLISHED patient here for follow up RIGHT breast cancer. No breast problems to report. Taking Letrozole. Breast history -  Referring - Dr. Arelia Holstein  2/11/19 - RIGHT breast biopsy - 67 yo female with right breast DCIS, grade 2 Stage 0 Er 100%  3/28/19 - RIGHT breast lumpectomy. LCIS. No DCIS. - Dr. Lakeisha Hou  4/2019- started Letrozole - Dr. Aliya Villalta      Family history -   Maternal cousin had breast cancer (unsure what age). Father had colon cancer. OB History    No obstetric history on file. Obstetric Comments   Menarche:  13. LMP: 1980's. # of Children:  2. Age at Delivery of First Child:  [address]   Hysterectomy/oophorectomy:  YES/YES. Breast Bx:  yes. Hx of Breast Feeding:  no. BCP:  yes. Hormone therapy:  yes.                       Past Surgical History:   Procedure Laterality Date    HX BREAST BIOPSY Left     early [de-identified]    HX BREAST LUMPECTOMY Right 3/28/2019    RIGHT BREAST LUMPECTOMY WITH ULTRASOUND performed by Jessica Fox MD at [facility] AMBUL[facility] OR    HX GI  1980's    gastric ulcer excision (1/3 stomach removed)    HX HEENT  2013    gum surgery    HX OOPHORECTOMY      HX ORTHOPAEDIC      broken wrist    HX OTHER SURGICAL  2013    left arm veins/tissue used for oral/facial reconstruction    HX PELON AND BSO      HX TRACHEOSTOMY  2013    removed    HX TYMPANOSTOMY Bilateral 2015    tubes    RI ABDOMEN SURGERY PROC UNLISTED  7/5/2013    Feeding tube placement ( has been removed)    RI EGD INSERT GUIDE WIRE DILATOR PASSAGE ESOPHAGUS  8/21/2017         UPPER GI ENDOSCOPY,WM RODRIGUES,30MM  10/26/2016         UPPER GI ENDOSCOPY,DILATN W GUIDE  2/27/2017         UPPER GI ENDOSCOPY,DILATN W GUIDE  3/27/2017         UPPER GI ENDOSCOPY,DILATN W GUIDE  4/12/2017         UPPER GI ENDOSCOPY,DILATN W GUIDE  5/1/2017         UPPER GI ENDOSCOPY,DILATN W GUIDE  10/2/2017         UPPER GI ENDOSCOPY,DILATN W GUIDE  11/10/2017 [facility] Results (most recent):  Results from [place] encounter on 01/26/22    [facility] 3D KAYA W MAMMO BI DX INCL CAD    Narrative  INDICATION: Right breast cancer 2019. COMPARISON: 2021 and prior. FINDINGS:  Bilateral digital diagnostic mammography, including 3-D tomosynthesis images,  interpreted in conjunction with CAD applied to the 2-D images, demonstrates no  suspicious calcifications or new masses. There is no significant change  including right breast treatment appearance. Breast Composition: Scattered fibroglandular densities. Impression  1. BIRADS: 2, BENIGN. 2. No mammographic evidence for malignancy. 3. Recommendation: Bilateral diagnostic mammogram in one year. 4. Patient informed. ROS    Physical Exam  Constitutional:       Appearance: Normal appearance. Chest:   Breasts:     Right: No mass, nipple discharge, skin change or tenderness. Left: No mass, nipple discharge, skin change or tenderness. Musculoskeletal:      Comments: FROM - UE x 2   Lymphadenopathy:      Upper Body:      Right upper body: No supraclavicular or axillary adenopathy. Left upper body: No supraclavicular or axillary adenopathy. Neurological:      Mental Status: She is alert. Psychiatric:         Attention and Perception: Attention normal.         Mood and Affect: Mood normal.         Speech: Speech normal.         Behavior: Behavior normal.       ASSESSMENT and PLAN    ICD-10-CM ICD-9-CM    1. Neoplasm of right breast, primary tumor staging category Tis: lobular carcinoma in situ (LCIS)  D05.01 233.0       2. S/P lumpectomy of breast  Z98.890 V45.89       3. History of breast cancer  Z85.3 V10.3 [facility] 3D KAYA W MAMMO BI DX INCL CAD          Normal exam with no evidence of local recurrence. Continues on AI and has follow-up with med onc. BDmammogram 3D in 1/2023. RTC in 1 year or sooner PRN. She is comfortable with this plan.   All questions answered and she stated understanding. Total time spent for this patient - 20 minutes.

## 2022-12-01 RX ORDER — AMLODIPINE BESYLATE 2.5 MG/1
2.5 TABLET ORAL DAILY
COMMUNITY

## 2022-12-01 NOTE — PERIOP NOTES
[facility]  Ambulatory Surgery Unit  Pre-operative Instructions    Surgery/Procedure Date  12/6            Tentative Arrival Time TBD      1. On the day of your surgery/procedure, please report to the Ambulatory Surgery Unit Registration Desk and sign in at your designated time. The Ambulatory Surgery Unit is located in [facility] on the [facility] side of the \[facility] [place] [facility]\"" across from the [address] [place] [address]. Please have all of your health insurance cards, co-payment, and a photo ID.    **TWO adults may accompany you the day of the procedure. We have limited seating available. If our waiting room is at capacity, your ride may be asked to remain in their vehicle. No one under 15 is allowed in the waiting room. 2. You must have someone with you to drive you home, as you should not drive a car for 24 hours following anesthesia. Please make arrangements for a responsible adult friend or family member to stay with you for at least the first 24 hours after your surgery. 3. Do not have anything to eat or drink (including water, gum, mints, coffee, juice) after 11:59 PM  12/5. This may not apply to medications prescribed by your physician. (Please note below the special instructions with medications to take the morning of surgery, if applicable.)    4. We recommend you do not drink any alcoholic beverages for 24 hours before and after your surgery. 5. Contact your surgeons office for instructions on the following medications: non-steroidal anti-inflammatory drugs (i.e. Advil, Aleve), vitamins, and supplements. (Some surgeons will want you to stop these medications prior to surgery and others may allow you to take them)   **If you are currently taking Plavix, Coumadin, Aspirin and/or other blood-thinning agents, contact your surgeon for instructions. ** Your surgeon will partner with the physician prescribing these medications to determine if it is safe to stop or if you need to continue taking. Please do not stop taking these medications without instructions from your surgeon. 6. In an effort to help prevent surgical site infection, we ask that you shower with an anti-bacterial soap (i.e. Dial/Safeguard, or the soap provided to you at your preadmission testing appointment) on the morning of surgery, using a fresh towel after each shower. (Please begin this process with fresh bed linens.) Do not apply any lotions, powders, or deodorants after the shower on the day of your procedure. If applicable, please do not shave the operative site for 48 hours prior to surgery. 7. Wear comfortable clothes. Wear glasses instead of contacts. Do not bring any jewelry or money (other than copays or fees as instructed). Do not wear make-up, particularly mascara, the morning of your surgery. Do not wear nail polish, particularly if you are having foot /hand surgery. Wear your hair loose or down, no ponytails, buns, jessica pins or clips. All body piercings must be removed. 8. You should understand that if you do not follow these instructions your surgery may be cancelled. If your physical condition changes (i.e. fever, cold or flu) please contact your surgeon as soon as possible. 9. It is important that you be on time. If a situation occurs where you may be late, or if you have any questions or problems, please call [phone].    10. Your surgery time may be subject to change. You will receive a phone call the day prior to surgery to confirm your arrival time. Special Instructions: Take all medications and inhalers, as prescribed, on the morning of surgery with a sip of water       I understand a pre-operative phone call will be made to verify my surgery time. In the event that I am not available, I give permission for a message to be left on my answering service and/or with another person?       Yes    Reviewed by phone with pt, verbalized understanding.       ___________________ ___________________      ________________  (Signature of Patient)          (Witness)                   (Date and Time)

## 2022-12-02 ENCOUNTER — ANESTHESIA EVENT (OUTPATIENT)
Dept: SURGERY | Age: 77
End: 2022-12-02
Payer: MEDICARE

## 2022-12-06 ENCOUNTER — ANESTHESIA (OUTPATIENT)
Dept: SURGERY | Age: 77
End: 2022-12-06
Payer: MEDICARE

## 2022-12-06 ENCOUNTER — HOSPITAL ENCOUNTER (OUTPATIENT)
Age: 77
Setting detail: OUTPATIENT SURGERY
Discharge: HOME | End: 2022-12-06
Attending: OPHTHALMOLOGY | Admitting: OPHTHALMOLOGY
Payer: MEDICARE

## 2022-12-06 VITALS
SYSTOLIC BLOOD PRESSURE: 140 MMHG | HEART RATE: 60 BPM | DIASTOLIC BLOOD PRESSURE: 65 MMHG | HEIGHT: 62 IN | TEMPERATURE: 97.2 F | WEIGHT: 134 LBS | BODY MASS INDEX: 24.66 KG/M2 | RESPIRATION RATE: 16 BRPM | OXYGEN SATURATION: 96 %

## 2022-12-06 PROCEDURE — 74011250636 HC RX REV CODE- 250/636

## 2022-12-06 PROCEDURE — 74011000250 HC RX REV CODE- 250: Performed by: OPHTHALMOLOGY

## 2022-12-06 PROCEDURE — 74011250636 HC RX REV CODE- 250/636: Performed by: NURSE ANESTHETIST, CERTIFIED REGISTERED

## 2022-12-06 PROCEDURE — 74011000250 HC RX REV CODE- 250

## 2022-12-06 PROCEDURE — V2632 POST CHMBR INTRAOCULAR LENS: HCPCS | Performed by: OPHTHALMOLOGY

## 2022-12-06 PROCEDURE — 76210000034 HC AMBSU PH I REC 0.5 TO 1 HR: Performed by: OPHTHALMOLOGY

## 2022-12-06 PROCEDURE — 76210000046 HC AMBSU PH II REC FIRST 0.5 HR: Performed by: OPHTHALMOLOGY

## 2022-12-06 PROCEDURE — 74011000250 HC RX REV CODE- 250: Performed by: NURSE ANESTHETIST, CERTIFIED REGISTERED

## 2022-12-06 PROCEDURE — 74011250636 HC RX REV CODE- 250/636: Performed by: ANESTHESIOLOGY

## 2022-12-06 PROCEDURE — 76030000002 HC AMB SURG OR TIME FIRST 0.: Performed by: OPHTHALMOLOGY

## 2022-12-06 PROCEDURE — 77030006709 HC BLD OPHTH SSPC -B: Performed by: OPHTHALMOLOGY

## 2022-12-06 PROCEDURE — 74011250636 HC RX REV CODE- 250/636: Performed by: OPHTHALMOLOGY

## 2022-12-06 PROCEDURE — 77030028792 HC AGNT OPHTH DUOVISC ALCN -B: Performed by: OPHTHALMOLOGY

## 2022-12-06 PROCEDURE — 76060000073 HC AMB SURG ANES FIRST 0.5 HR: Performed by: OPHTHALMOLOGY

## 2022-12-06 PROCEDURE — 2709999900 HC NON-CHARGEABLE SUPPLY: Performed by: OPHTHALMOLOGY

## 2022-12-06 DEVICE — LENS IOL SN60WF 23.5D: Type: IMPLANTABLE DEVICE | Site: EYE | Status: FUNCTIONAL

## 2022-12-06 RX ORDER — UREA 10 %
100 LOTION (ML) TOPICAL DAILY
COMMUNITY

## 2022-12-06 RX ORDER — DICLOFENAC SODIUM 1 MG/ML
1 SOLUTION/ DROPS OPHTHALMIC
Status: COMPLETED | OUTPATIENT
Start: 2022-12-06 | End: 2022-12-06

## 2022-12-06 RX ORDER — DIPHENHYDRAMINE HYDROCHLORIDE 50 MG/ML
12.5 INJECTION, SOLUTION INTRAMUSCULAR; INTRAVENOUS AS NEEDED
Status: DISCONTINUED | OUTPATIENT
Start: 2022-12-06 | End: 2022-12-06 | Stop reason: HOSPADM

## 2022-12-06 RX ORDER — LIDOCAINE HYDROCHLORIDE 20 MG/ML
INJECTION, SOLUTION EPIDURAL; INFILTRATION; INTRACAUDAL; PERINEURAL AS NEEDED
Status: DISCONTINUED | OUTPATIENT
Start: 2022-12-06 | End: 2022-12-06 | Stop reason: HOSPADM

## 2022-12-06 RX ORDER — CYCLOPENTOLATE HYDROCHLORIDE 20 MG/ML
1 SOLUTION/ DROPS OPHTHALMIC
Status: DISCONTINUED | OUTPATIENT
Start: 2022-12-06 | End: 2022-12-06 | Stop reason: HOSPADM

## 2022-12-06 RX ORDER — PROPARACAINE HYDROCHLORIDE 5 MG/ML
1 SOLUTION/ DROPS OPHTHALMIC
Status: COMPLETED | OUTPATIENT
Start: 2022-12-06 | End: 2022-12-06

## 2022-12-06 RX ORDER — SODIUM CHLORIDE 9 MG/ML
25 INJECTION, SOLUTION INTRAVENOUS ONCE
Status: DISCONTINUED | OUTPATIENT
Start: 2022-12-06 | End: 2022-12-06 | Stop reason: HOSPADM

## 2022-12-06 RX ORDER — SODIUM CHLORIDE 0.9 % (FLUSH) 0.9 %
5-40 SYRINGE (ML) INJECTION AS NEEDED
Status: DISCONTINUED | OUTPATIENT
Start: 2022-12-06 | End: 2022-12-06 | Stop reason: HOSPADM

## 2022-12-06 RX ORDER — SODIUM CHLORIDE, SODIUM LACTATE, POTASSIUM CHLORIDE, CALCIUM CHLORIDE 600; 310; 30; 20 MG/100ML; MG/100ML; MG/100ML; MG/100ML
25 INJECTION, SOLUTION INTRAVENOUS CONTINUOUS
Status: DISCONTINUED | OUTPATIENT
Start: 2022-12-06 | End: 2022-12-06 | Stop reason: HOSPADM

## 2022-12-06 RX ORDER — MECLIZINE HYDROCHLORIDE 25 MG/1
25 TABLET ORAL ONCE
Status: COMPLETED | OUTPATIENT
Start: 2022-12-06 | End: 2022-12-06

## 2022-12-06 RX ORDER — OXYCODONE AND ACETAMINOPHEN 5; 325 MG/1; MG/1
1 TABLET ORAL
Status: DISCONTINUED | OUTPATIENT
Start: 2022-12-06 | End: 2022-12-06 | Stop reason: HOSPADM

## 2022-12-06 RX ORDER — PHENYLEPHRINE HYDROCHLORIDE 25 MG/ML
1 SOLUTION/ DROPS OPHTHALMIC
Status: COMPLETED | OUTPATIENT
Start: 2022-12-06 | End: 2022-12-06

## 2022-12-06 RX ORDER — MECLIZINE HYDROCHLORIDE 25 MG/1
TABLET ORAL
Status: COMPLETED
Start: 2022-12-06 | End: 2022-12-06

## 2022-12-06 RX ORDER — SODIUM CHLORIDE 0.9 % (FLUSH) 0.9 %
5-40 SYRINGE (ML) INJECTION EVERY 8 HOURS
Status: DISCONTINUED | OUTPATIENT
Start: 2022-12-06 | End: 2022-12-06 | Stop reason: HOSPADM

## 2022-12-06 RX ORDER — TROPICAMIDE 10 MG/ML
1 SOLUTION/ DROPS OPHTHALMIC
Status: COMPLETED | OUTPATIENT
Start: 2022-12-06 | End: 2022-12-06

## 2022-12-06 RX ORDER — NEOMYCIN SULFATE, POLYMYXIN B SULFATE, AND DEXAMETHASONE 3.5; 10000; 1 MG/G; [USP'U]/G; MG/G
OINTMENT OPHTHALMIC 4 TIMES DAILY
Status: DISCONTINUED | OUTPATIENT
Start: 2022-12-06 | End: 2022-12-06 | Stop reason: HOSPADM

## 2022-12-06 RX ORDER — HYDROMORPHONE HYDROCHLORIDE 1 MG/ML
.2-.5 INJECTION, SOLUTION INTRAMUSCULAR; INTRAVENOUS; SUBCUTANEOUS ONCE
Status: DISCONTINUED | OUTPATIENT
Start: 2022-12-06 | End: 2022-12-06 | Stop reason: HOSPADM

## 2022-12-06 RX ORDER — FENTANYL CITRATE 50 UG/ML
25 INJECTION, SOLUTION INTRAMUSCULAR; INTRAVENOUS
Status: DISCONTINUED | OUTPATIENT
Start: 2022-12-06 | End: 2022-12-06 | Stop reason: HOSPADM

## 2022-12-06 RX ORDER — TROPICAMIDE 10 MG/ML
SOLUTION/ DROPS OPHTHALMIC
Status: COMPLETED
Start: 2022-12-06 | End: 2022-12-06

## 2022-12-06 RX ORDER — PROPOFOL 10 MG/ML
INJECTION, EMULSION INTRAVENOUS AS NEEDED
Status: DISCONTINUED | OUTPATIENT
Start: 2022-12-06 | End: 2022-12-06 | Stop reason: HOSPADM

## 2022-12-06 RX ORDER — TETRACAINE HYDROCHLORIDE 5 MG/ML
1 SOLUTION OPHTHALMIC
Status: DISCONTINUED | OUTPATIENT
Start: 2022-12-06 | End: 2022-12-06 | Stop reason: HOSPADM

## 2022-12-06 RX ORDER — LIDOCAINE HYDROCHLORIDE 10 MG/ML
0.1 INJECTION, SOLUTION EPIDURAL; INFILTRATION; INTRACAUDAL; PERINEURAL AS NEEDED
Status: DISCONTINUED | OUTPATIENT
Start: 2022-12-06 | End: 2022-12-06 | Stop reason: HOSPADM

## 2022-12-06 RX ADMIN — PHENYLEPHRINE HYDROCHLORIDE 1 DROP: 25 SOLUTION/ DROPS OPHTHALMIC at 09:55

## 2022-12-06 RX ADMIN — CYCLOPENTOLATE HYDROCHLORIDE 1 DROP: 20 SOLUTION/ DROPS OPHTHALMIC at 09:47

## 2022-12-06 RX ADMIN — PHENYLEPHRINE HYDROCHLORIDE 1 DROP: 25 SOLUTION/ DROPS OPHTHALMIC at 09:46

## 2022-12-06 RX ADMIN — PROPARACAINE HYDROCHLORIDE 1 DROP: 5 SOLUTION/ DROPS OPHTHALMIC at 09:46

## 2022-12-06 RX ADMIN — DICLOFENAC SODIUM 1 DROP: 1 SOLUTION/ DROPS OPHTHALMIC at 09:55

## 2022-12-06 RX ADMIN — CYCLOPENTOLATE HYDROCHLORIDE 1 DROP: 20 SOLUTION/ DROPS OPHTHALMIC at 09:55

## 2022-12-06 RX ADMIN — PROPARACAINE HYDROCHLORIDE 1 DROP: 5 SOLUTION/ DROPS OPHTHALMIC at 10:00

## 2022-12-06 RX ADMIN — PHENYLEPHRINE HYDROCHLORIDE 1 DROP: 25 SOLUTION/ DROPS OPHTHALMIC at 10:00

## 2022-12-06 RX ADMIN — DICLOFENAC SODIUM 1 DROP: 1 SOLUTION/ DROPS OPHTHALMIC at 10:00

## 2022-12-06 RX ADMIN — TROPICAMIDE 1 DROP: 10 SOLUTION/ DROPS OPHTHALMIC at 09:47

## 2022-12-06 RX ADMIN — TROPICAMIDE 1 DROP: 10 SOLUTION/ DROPS OPHTHALMIC at 09:55

## 2022-12-06 RX ADMIN — MECLIZINE HYDROCHLORIDE 25 MG: 25 TABLET ORAL at 09:59

## 2022-12-06 RX ADMIN — PROPOFOL 30 MG: 10 INJECTION, EMULSION INTRAVENOUS at 10:29

## 2022-12-06 RX ADMIN — DICLOFENAC SODIUM 1 DROP: 1 SOLUTION/ DROPS OPHTHALMIC at 09:46

## 2022-12-06 RX ADMIN — CYCLOPENTOLATE HYDROCHLORIDE 1 DROP: 20 SOLUTION/ DROPS OPHTHALMIC at 10:00

## 2022-12-06 RX ADMIN — TROPICAMIDE 1 DROP: 10 SOLUTION/ DROPS OPHTHALMIC at 10:00

## 2022-12-06 RX ADMIN — SODIUM CHLORIDE, POTASSIUM CHLORIDE, SODIUM LACTATE AND CALCIUM CHLORIDE: 600; 310; 30; 20 INJECTION, SOLUTION INTRAVENOUS at 10:25

## 2022-12-06 RX ADMIN — LIDOCAINE HYDROCHLORIDE 50 MG: 20 INJECTION, SOLUTION EPIDURAL; INFILTRATION; INTRACAUDAL; PERINEURAL at 10:29

## 2022-12-06 NOTE — ANESTHESIA POSTPROCEDURE EVALUATION
Procedure(s):  LEFT EYE PHACOEMULSIFICATION WITH INTRA OCULAR LENS IMPLANT (MAC/RETROBULBAR). MAC    Anesthesia Post Evaluation      Multimodal analgesia: multimodal analgesia not used between 6 hours prior to anesthesia start to PACU discharge  Patient location during evaluation: PACU  Patient participation: complete - patient participated  Level of consciousness: awake and alert  Pain score: 0  Airway patency: patent  Anesthetic complications: no  Cardiovascular status: acceptable  Respiratory status: acceptable  Hydration status: acceptable  Post anesthesia nausea and vomiting:  none  Final Post Anesthesia Temperature Assessment:  Normothermia (36.0-37.5 degrees C)      INITIAL Post-op Vital signs:   Vitals Value Taken Time   /65 12/06/22 1136   Temp 36.2 °C (97.2 °F) 12/06/22 1136   Pulse 63 12/06/22 1136   Resp 15 12/06/22 1136   SpO2 87 % 12/06/22 1136   Vitals shown include unvalidated device data.

## 2022-12-06 NOTE — BRIEF OP NOTE
Brief Postoperative Note    Patient: Froilan Flores  YOB: 1945  MRN: [id number]    Date of Procedure: 12/6/2022     Pre-Op Diagnosis: CATARACT    Post-Op Diagnosis: Same as preoperative diagnosis. Procedure(s):  LEFT EYE PHACOEMULSIFICATION WITH INTRA OCULAR LENS IMPLANT (MAC/RETROBULBAR)    Surgeon(s):  Carmelita Wood MD    Surgical Assistant: None    Anesthesia: MAC     Estimated Blood Loss (mL): Minimal    Complications: None    Specimens: * No specimens in log *     Implants:   Implant Name Type Inv.  Item Serial No.  Lot No. LRB No. Used Action   LENS IOL SN60WF 23.5D - [id number] Other LENS IOL SN60WF 23.5D [id number] N/A Left 1 Implanted       Drains: * No LDAs found *    Findings: none    Electronically Signed by Niharika Talbert MD on 12/6/2022 at 1:47 PM

## 2022-12-06 NOTE — PERIOP NOTES
1054- upon arrival to pacu, crna requested help ambulating pt to bathroom, as pt having pain 10/10 needing to void. Assisted to bathroom, pt needed to sit appx 5-7 min to be able to void. Pt reports this is her normal.  Pt able to void small amount of urine, again states this is her normal.  States she has     1100 assisted back to stretcher with crna and moved to PACU bay. VS obtained. Pt began to complain of nausea, emesis bag provided, dry heaves. /52 HR 52.    1102 pt states nausea subsided. Report received. 1105 VSS. Denies complaints. 1120 declined po fluids. D/c instructions reviewed with . Verbalized understanding. 1132 pt c/o bladder pain needing to void. Ambulated to bathroom with minimal assist to void. Pt expresses desire for discharge. [address] [facility] [address] Aleman  speaking with pt.    [phone] discharged to car via wheelchair. Discharged home with . Discharge instructions with pt.   Pt remains A+OX4

## 2022-12-06 NOTE — PERIOP NOTES
Dexter Senthil  1945  [id number]    Situation:  Verbal report given from: Aliza Conrad RN  Procedure: Procedure(s):  LEFT EYE PHACOEMULSIFICATION WITH INTRA OCULAR LENS IMPLANT (MAC/RETROBULBAR)    Background:    Preoperative diagnosis: CATARACT    Postoperative diagnosis: CATARACT    :  Dr. Shavon Santa    Assistant(s): Circ-1: Kp Russ RN  Circ-2: Bradley Peters RN  Scrub Tech-1: Johnnie Paz [place]  Flo Staff: Angie Gray RN    Specimens: * No specimens in log *    Assessment:  Intra-procedure medications         Anesthesia gave intra-procedure sedation and medications, see anesthesia flow sheet     Intravenous fluids: NS@ KVO     Vital signs stable       Recommendation:    Permission to share finding with  : yes

## 2022-12-06 NOTE — DISCHARGE INSTRUCTIONS
Dr. Sanchez [address].  [place], [facility]  [phone]    Cataract Post Operative Instructions    Diet - you may eat a normal diet but avoid spicy or greasy tonight. Activity - Limit heavy lifting - do not lift more than 20 pounds for the next 7 days. Leave your eye patch on tonight. Your eye should remain closed under the patch. Your patch will be removed in Dr. Tanya Cid office tomorrow. Most people do not have significant pain after cataract surgery. You may take any over-the-counter pain medication that you normally take as needed. You may resume all of your pre-operative medications. You should have your postoperative drops. If you do not[facility] You will start eyedrops TOMORROW. You have an appointment with Dr. Liz Young tomorrow in her office. Date: __12/7______________ Time: ___9:00am______________    If you need to speak to Dr. Liz Young after business hours, please call [phone]. DO NOT TAKE SLEEPING MEDICATIONS OR ANTIANXIETY MEDICATIONS WHILE TAKING NARCOTIC PAIN MEDICATIONS,  ESPECIALLY THE NIGHT OF ANESTHESIA. CPAP PATIENTS BE SURE TO WEAR MACHINE WHENEVER NAPPING OR SLEEPING. DISCHARGE SUMMARY from Nurse    The following personal items collected during your admission are returned to you:   Dental Appliance: Dental Appliances: Uppers  Vision: Visual Aid: Glasses  Hearing Aid:    Jewelry:    Clothing:    Other Valuables:    Valuables sent to safe:        PATIENT INSTRUCTIONS:    Anesthesia Discharge Instructions for Procedural Area requiring Sedation (MAC Anesthesia, Cath Lab, Endo and Radiology): You have been given medications during your procedure that may affect your memory and mental judgement for the next 24 hours.  During this time frame for your safety, please follow the instructions listed below :   Have a responsible adult to drive you home and be with you for at least 24 hours. Rest today and resume normal activities tomorrow. Start with a soft bland diet and advance as tolerated to your recommended diet. Do not drive any motor vehicle or operate mechanical or electrical equipment prior to [employer]. Avoid making critical decisions or signing legal documents prior to 6am tomorrow. Do not drink alcohol prior to 6am tomorrow. If you have sleep apnea and you plan to go home and take a nap, please use your CPAP machine not only at bedtime, but also while napping for 24 hours. If you notice any redness or swelling on parts of your body where IV medications were given, place a warm wet washcloth over the area for 20 minutes at a time until the redness or swelling goes away. If you still have redness or swelling after 2-3 days, please call us. You will receive a Post Operative Call from one of the Recovery Room Nurses on the day after your surgery to check on you. It is very important for us to know how you are recovering after your surgery. If you have an issue or need to speak with someone, please call your surgeon, do not wait for the post operative call. You may receive an e-mail or letter in the mail from [employer] regarding your experience with us in the Ambulatory Surgery Unit. Your feedback is valuable to us and we appreciate your participation in the survey. We wish you a speedy recovery ? What to do at Home:      *  Please give a list of your current medications to your Primary Care Provider. *  Please update this list whenever your medications are discontinued, doses are      changed, or new medications (including over-the-counter products) are added. *  Please carry medication information at all times in case of emergency situations. If you have not received your influenza and/or pneumococcal vaccine, please follow up with your primary care physician.       The discharge information has been reviewed with the patient and caregiver. The patient and caregiver verbalized understanding.

## 2022-12-06 NOTE — OP NOTES
Operative Note  . Operative Report    Patient: Eli Glover MRN: [id number]  SSN: [id number]    YOB: 1945  Age: 76 y.o. Sex: female      Indications: The patient complains of painless progressive visual loss and reports difficulty with activities of daily living. Cataract surgery has been recommended to improve vision for activities of daily living, and the patient has elected to proceed. Date of Surgery: 12/6/2022     Preoperative Diagnosis: CATARACT OS H25.812    Postoperative Diagnosis: CATARACT    Surgeon(s) and Role:     * Yumiko Gonzales MD - Primary     Anesthesia:  MAC     Procedure: Procedure(s):  LEFT EYE PHACOEMULSIFICATION WITH INTRA OCULAR LENS IMPLANT (MAC/RETROBULBAR)     Procedure in Detail:  After informed consent was obtained, the patient was brought into the operating suite. MAC with sedation and a retrobulbar block using a 50/50 mixture of lidocaine 2% and Marcaine 0.75% with added Amphatase was administered without complication. The patient was prepped and draped in the usual sterile ophthalmic fashion. A wire lid speculum was placed. A paracentesis was made using a 75 blade. The eye was filled with Provisc. A 2.8mm keratome was used to make a temporal clear corneal incision. A cystotome and Utrada forcep was used to make a continuous curvilinear capsulorrhexis without complication. Hydrodisection was performed until the nuclear rotated freely in the capsular bag. The cataract was removed using a two handed divide and conquer technique using a Dario as a second instrument. Irrigation/aspiration was used to remove the remaining cortex. Capsular polish was used as needed. The bag was inflated using Provisc. An intraocular lens was placed into the bag without complication. Irrigation/aspiration was used to remove the viscoelastic. The chamber was filled with BSS and the wound hydrated. The wound was found to be watertight.   Subconjunctival injections of ancef and decadron were given. The lid speculum and drape was removed. The eye was dressed with ointment, eye pads, and eye shield. The patient was taken to the recover room having tolerated the procedure well. Findings:  None         Estimated Blood Loss:  Minimal    Specimens: * No specimens in log *                Implants:   Implant Name Type Inv.  Item Serial No.  Lot No. LRB No. Used Action   LENS IOL SN60WF 23.5D - [id number] Other LENS IOL SN60WF 23.5D [id number] [employer] N/A Left 1 Implanted              Complications:  None

## 2022-12-06 NOTE — PERIOP NOTES
Permission received to review discharge instructions and discuss private health information with  Daniella Zaragoza  and will have someone with them after discharge

## 2022-12-06 NOTE — PERIOP NOTES
Patient: Saeid Stevens MRN: [id number]  SSN: [id number]   YOB: 1945  Age: 76 y.o. Sex: female     Patient is status post Procedure(s):  LEFT EYE PHACOEMULSIFICATION WITH INTRA OCULAR LENS IMPLANT (MAC/RETROBULBAR). Surgeon(s) and Role:     Curtis Fontana MD - Primary    Local/Dose/Irrigation:  SEE MAR                  Peripheral IV 12/06/22 Right Antecubital (Active)   Site Assessment Clean, dry, & intact 12/06/22 0957   Phlebitis Assessment 0 12/06/22 0957   Infiltration Assessment 0 12/06/22 0957   Dressing Status Clean, dry, & intact 12/06/22 0957   Dressing Type Transparent;Tape 12/06/22 0957   Hub Color/Line Status Infusing;Blue 12/06/22 0957                           Dressing/Packing:  Incision 12/06/22 Eye Left-Dressing/Treatment: Tape/Soft cloth adhesive tape; Eye pad;Eye shield (SECURED WITH TAPE BY MD.) (12/06/22 0900)

## 2022-12-06 NOTE — ANESTHESIA PREPROCEDURE EVALUATION
Anesthetic History   No history of anesthetic complications            Review of Systems / Medical History  Patient summary reviewed, nursing notes reviewed and pertinent labs reviewed    Pulmonary                Comments: Former Smoker  H/o tracheotomy 2013   Neuro/Psych   Within defined limits           Cardiovascular    Hypertension: well controlled          Hyperlipidemia    Exercise tolerance: >4 METS     GI/Hepatic/Renal     GERD: well controlled      PUD    Comments: ESOPHAGEAL STRICTURE  DYSPHAGIA Endo/Other      Hypothyroidism: well controlled  Cancer  Pertinent negatives: No diabetes   Other Findings   Comments: Cataracts    Hx gum cancer 2013  Gastric cancer  Right Breast cancer 2019           Physical Exam    Airway            Comments: Trachea appears midline  Dysphagia resolved Cardiovascular               Dental    Dentition: Full upper dentures and Implants  Comments: Lower implants   Pulmonary                 Abdominal  GI exam deferred       Other Findings            Anesthetic Plan    ASA: 2  Anesthesia type: MAC          Induction: Intravenous  Anesthetic plan and risks discussed with: Patient      H/o vertigo when laying flat: po meclizine preop

## 2022-12-19 ENCOUNTER — ANESTHESIA EVENT (OUTPATIENT)
Dept: SURGERY | Age: 77
End: 2022-12-19
Payer: MEDICARE

## 2022-12-20 ENCOUNTER — ANESTHESIA (OUTPATIENT)
Dept: SURGERY | Age: 77
End: 2022-12-20
Payer: MEDICARE

## 2022-12-20 ENCOUNTER — HOSPITAL ENCOUNTER (OUTPATIENT)
Age: 77
Setting detail: OUTPATIENT SURGERY
Discharge: HOME | End: 2022-12-20
Attending: OPHTHALMOLOGY | Admitting: OPHTHALMOLOGY
Payer: MEDICARE

## 2022-12-20 VITALS
HEIGHT: 62 IN | OXYGEN SATURATION: 96 % | HEART RATE: 64 BPM | DIASTOLIC BLOOD PRESSURE: 57 MMHG | BODY MASS INDEX: 24.66 KG/M2 | WEIGHT: 134 LBS | TEMPERATURE: 97.3 F | RESPIRATION RATE: 15 BRPM | SYSTOLIC BLOOD PRESSURE: 162 MMHG

## 2022-12-20 PROCEDURE — 74011250636 HC RX REV CODE- 250/636: Performed by: NURSE ANESTHETIST, CERTIFIED REGISTERED

## 2022-12-20 PROCEDURE — 76210000046 HC AMBSU PH II REC FIRST 0.5 HR: Performed by: OPHTHALMOLOGY

## 2022-12-20 PROCEDURE — 76210000040 HC AMBSU PH I REC FIRST 0.5 HR: Performed by: OPHTHALMOLOGY

## 2022-12-20 PROCEDURE — 74011250636 HC RX REV CODE- 250/636: Performed by: STUDENT IN AN ORGANIZED HEALTH CARE EDUCATION/TRAINING PROGRAM

## 2022-12-20 PROCEDURE — V2632 POST CHMBR INTRAOCULAR LENS: HCPCS | Performed by: OPHTHALMOLOGY

## 2022-12-20 PROCEDURE — 2709999900 HC NON-CHARGEABLE SUPPLY: Performed by: OPHTHALMOLOGY

## 2022-12-20 PROCEDURE — 74011000250 HC RX REV CODE- 250

## 2022-12-20 PROCEDURE — 77030006709 HC BLD OPHTH SSPC -B: Performed by: OPHTHALMOLOGY

## 2022-12-20 PROCEDURE — 74011000250 HC RX REV CODE- 250: Performed by: OPHTHALMOLOGY

## 2022-12-20 PROCEDURE — 77030010802: Performed by: OPHTHALMOLOGY

## 2022-12-20 PROCEDURE — 74011000250 HC RX REV CODE- 250: Performed by: NURSE ANESTHETIST, CERTIFIED REGISTERED

## 2022-12-20 PROCEDURE — 76030000002 HC AMB SURG OR TIME FIRST 0.: Performed by: OPHTHALMOLOGY

## 2022-12-20 PROCEDURE — 76060000073 HC AMB SURG ANES FIRST 0.5 HR: Performed by: OPHTHALMOLOGY

## 2022-12-20 PROCEDURE — 74011250636 HC RX REV CODE- 250/636: Performed by: OPHTHALMOLOGY

## 2022-12-20 DEVICE — LENS IOL POST 1-PC 6X13 21.5 -- ACRYSOF: Type: IMPLANTABLE DEVICE | Site: EYE | Status: FUNCTIONAL

## 2022-12-20 RX ORDER — MIDAZOLAM HYDROCHLORIDE 1 MG/ML
1 INJECTION, SOLUTION INTRAMUSCULAR; INTRAVENOUS AS NEEDED
Status: DISCONTINUED | OUTPATIENT
Start: 2022-12-20 | End: 2022-12-20 | Stop reason: HOSPADM

## 2022-12-20 RX ORDER — TROPICAMIDE 10 MG/ML
1 SOLUTION/ DROPS OPHTHALMIC
Status: COMPLETED | OUTPATIENT
Start: 2022-12-20 | End: 2022-12-20

## 2022-12-20 RX ORDER — PROPARACAINE HYDROCHLORIDE 5 MG/ML
1 SOLUTION/ DROPS OPHTHALMIC
Status: COMPLETED | OUTPATIENT
Start: 2022-12-20 | End: 2022-12-20

## 2022-12-20 RX ORDER — LIDOCAINE HYDROCHLORIDE 10 MG/ML
0.1 INJECTION, SOLUTION EPIDURAL; INFILTRATION; INTRACAUDAL; PERINEURAL AS NEEDED
Status: DISCONTINUED | OUTPATIENT
Start: 2022-12-20 | End: 2022-12-20 | Stop reason: HOSPADM

## 2022-12-20 RX ORDER — CYCLOPENTOLATE HYDROCHLORIDE 20 MG/ML
1 SOLUTION/ DROPS OPHTHALMIC
Status: COMPLETED | OUTPATIENT
Start: 2022-12-20 | End: 2022-12-20

## 2022-12-20 RX ORDER — ONDANSETRON 2 MG/ML
4 INJECTION INTRAMUSCULAR; INTRAVENOUS AS NEEDED
Status: DISCONTINUED | OUTPATIENT
Start: 2022-12-20 | End: 2022-12-20 | Stop reason: HOSPADM

## 2022-12-20 RX ORDER — NEOMYCIN SULFATE, POLYMYXIN B SULFATE, AND DEXAMETHASONE 3.5; 10000; 1 MG/G; [USP'U]/G; MG/G
OINTMENT OPHTHALMIC 4 TIMES DAILY
Status: DISCONTINUED | OUTPATIENT
Start: 2022-12-20 | End: 2022-12-20 | Stop reason: HOSPADM

## 2022-12-20 RX ORDER — LIDOCAINE HYDROCHLORIDE 20 MG/ML
INJECTION, SOLUTION EPIDURAL; INFILTRATION; INTRACAUDAL; PERINEURAL AS NEEDED
Status: DISCONTINUED | OUTPATIENT
Start: 2022-12-20 | End: 2022-12-20 | Stop reason: HOSPADM

## 2022-12-20 RX ORDER — SODIUM CHLORIDE, SODIUM LACTATE, POTASSIUM CHLORIDE, CALCIUM CHLORIDE 600; 310; 30; 20 MG/100ML; MG/100ML; MG/100ML; MG/100ML
25 INJECTION, SOLUTION INTRAVENOUS CONTINUOUS
Status: DISCONTINUED | OUTPATIENT
Start: 2022-12-20 | End: 2022-12-20 | Stop reason: HOSPADM

## 2022-12-20 RX ORDER — MECLIZINE HYDROCHLORIDE 25 MG/1
25 TABLET ORAL ONCE
Status: COMPLETED | OUTPATIENT
Start: 2022-12-20 | End: 2022-12-20

## 2022-12-20 RX ORDER — FENTANYL CITRATE 50 UG/ML
50 INJECTION, SOLUTION INTRAMUSCULAR; INTRAVENOUS AS NEEDED
Status: DISCONTINUED | OUTPATIENT
Start: 2022-12-20 | End: 2022-12-20 | Stop reason: HOSPADM

## 2022-12-20 RX ORDER — SODIUM CHLORIDE 9 MG/ML
INJECTION, SOLUTION INTRAVENOUS
Status: DISCONTINUED | OUTPATIENT
Start: 2022-12-20 | End: 2022-12-20 | Stop reason: HOSPADM

## 2022-12-20 RX ORDER — DICLOFENAC SODIUM 1 MG/ML
1 SOLUTION/ DROPS OPHTHALMIC
Status: COMPLETED | OUTPATIENT
Start: 2022-12-20 | End: 2022-12-20

## 2022-12-20 RX ORDER — PROPOFOL 10 MG/ML
INJECTION, EMULSION INTRAVENOUS AS NEEDED
Status: DISCONTINUED | OUTPATIENT
Start: 2022-12-20 | End: 2022-12-20 | Stop reason: HOSPADM

## 2022-12-20 RX ORDER — PHENYLEPHRINE HYDROCHLORIDE 25 MG/ML
1 SOLUTION/ DROPS OPHTHALMIC
Status: COMPLETED | OUTPATIENT
Start: 2022-12-20 | End: 2022-12-20

## 2022-12-20 RX ORDER — SODIUM CHLORIDE 9 MG/ML
25 INJECTION, SOLUTION INTRAVENOUS ONCE
Status: COMPLETED | OUTPATIENT
Start: 2022-12-20 | End: 2022-12-20

## 2022-12-20 RX ORDER — NEOMYCIN SULFATE, POLYMYXIN B SULFATE, AND DEXAMETHASONE 3.5; 10000; 1 MG/G; [USP'U]/G; MG/G
OINTMENT OPHTHALMIC AS NEEDED
Status: DISCONTINUED | OUTPATIENT
Start: 2022-12-20 | End: 2022-12-20 | Stop reason: HOSPADM

## 2022-12-20 RX ORDER — TROPICAMIDE 10 MG/ML
SOLUTION/ DROPS OPHTHALMIC
Status: COMPLETED
Start: 2022-12-20 | End: 2022-12-20

## 2022-12-20 RX ORDER — HYDROMORPHONE HYDROCHLORIDE 1 MG/ML
.2-.5 INJECTION, SOLUTION INTRAMUSCULAR; INTRAVENOUS; SUBCUTANEOUS
Status: DISCONTINUED | OUTPATIENT
Start: 2022-12-20 | End: 2022-12-20 | Stop reason: HOSPADM

## 2022-12-20 RX ORDER — FENTANYL CITRATE 50 UG/ML
25 INJECTION, SOLUTION INTRAMUSCULAR; INTRAVENOUS
Status: DISCONTINUED | OUTPATIENT
Start: 2022-12-20 | End: 2022-12-20 | Stop reason: HOSPADM

## 2022-12-20 RX ADMIN — PHENYLEPHRINE HYDROCHLORIDE 1 DROP: 25 SOLUTION/ DROPS OPHTHALMIC at 09:07

## 2022-12-20 RX ADMIN — LIDOCAINE HYDROCHLORIDE 40 MG: 20 INJECTION, SOLUTION EPIDURAL; INFILTRATION; INTRACAUDAL; PERINEURAL at 10:02

## 2022-12-20 RX ADMIN — SODIUM CHLORIDE 25 ML/HR: 9 INJECTION, SOLUTION INTRAVENOUS at 08:58

## 2022-12-20 RX ADMIN — TROPICAMIDE 1 DROP: 10 SOLUTION/ DROPS OPHTHALMIC at 09:19

## 2022-12-20 RX ADMIN — PHENYLEPHRINE HYDROCHLORIDE 1 DROP: 25 SOLUTION/ DROPS OPHTHALMIC at 09:19

## 2022-12-20 RX ADMIN — DICLOFENAC SODIUM 1 DROP: 1 SOLUTION/ DROPS OPHTHALMIC at 09:07

## 2022-12-20 RX ADMIN — CYCLOPENTOLATE HYDROCHLORIDE 1 DROP: 20 SOLUTION/ DROPS OPHTHALMIC at 09:03

## 2022-12-20 RX ADMIN — MECLIZINE HYDROCHLORIDE 25 MG: 25 TABLET ORAL at 08:59

## 2022-12-20 RX ADMIN — CYCLOPENTOLATE HYDROCHLORIDE 1 DROP: 20 SOLUTION/ DROPS OPHTHALMIC at 09:19

## 2022-12-20 RX ADMIN — SODIUM CHLORIDE: 0.9 INJECTION, SOLUTION INTRAVENOUS at 09:58

## 2022-12-20 RX ADMIN — TROPICAMIDE 1 DROP: 10 SOLUTION/ DROPS OPHTHALMIC at 09:07

## 2022-12-20 RX ADMIN — TROPICAMIDE 1 DROP: 10 SOLUTION/ DROPS OPHTHALMIC at 09:03

## 2022-12-20 RX ADMIN — PROPOFOL 20 MG: 10 INJECTION, EMULSION INTRAVENOUS at 10:04

## 2022-12-20 RX ADMIN — PROPARACAINE HYDROCHLORIDE 1 DROP: 5 SOLUTION/ DROPS OPHTHALMIC at 09:03

## 2022-12-20 RX ADMIN — PROPARACAINE HYDROCHLORIDE 1 DROP: 5 SOLUTION/ DROPS OPHTHALMIC at 09:19

## 2022-12-20 RX ADMIN — DICLOFENAC SODIUM 1 DROP: 1 SOLUTION/ DROPS OPHTHALMIC at 09:19

## 2022-12-20 RX ADMIN — CYCLOPENTOLATE HYDROCHLORIDE 1 DROP: 20 SOLUTION/ DROPS OPHTHALMIC at 09:07

## 2022-12-20 RX ADMIN — PHENYLEPHRINE HYDROCHLORIDE 1 DROP: 25 SOLUTION/ DROPS OPHTHALMIC at 09:03

## 2022-12-20 RX ADMIN — DICLOFENAC SODIUM 1 DROP: 1 SOLUTION/ DROPS OPHTHALMIC at 09:03

## 2022-12-20 RX ADMIN — PROPOFOL 30 MG: 10 INJECTION, EMULSION INTRAVENOUS at 10:02

## 2022-12-20 NOTE — PERIOP NOTES
Pt. States ready for discharged. Vss. Denies pain. Eye patch to right eye intact. Discharge instructions reviewed w/pt. And . He verbalized understanding. Pt discharged via wheelchair to car, accompanied by RN. Pt discharged awake and alert, respirations equal and unlabored, skin warm, dry, and intact. Pt and family members' questions and concerns addressed prior to discharge.

## 2022-12-20 NOTE — PERIOP NOTES
Jonas January 1945  [id number]    Situation:  Verbal report given from:  Taylor Valles CRNA, Ezio Cooper RN  Procedure: Procedure(s):  PHACOEMULSIFICATION WITH INTRA OCULAR LENS IMPLANT - RIGHT EYE (ANES RETROBULBAR)    Background:    Preoperative diagnosis: CATARACT OD    Postoperative diagnosis: CATARACT OD    :  Dr. Jovana Sutton    Assistant(s): Circ-1: Johnnie Marshall RN  Circ-2: Quita Rodrigez RN  Scrub Tech-1: Norbert Rueda    Specimens: * No specimens in log *    Assessment:  Intra-procedure medications         Anesthesia gave intra-procedure sedation and medications, see anesthesia flow sheet     Intravenous fluids: LR@ KVO     Vital signs stable       Recommendation:    Permission to share finding with  : yes

## 2022-12-20 NOTE — ANESTHESIA POSTPROCEDURE EVALUATION
Procedure(s):  PHACOEMULSIFICATION WITH INTRA OCULAR LENS IMPLANT - RIGHT EYE (ANES RETROBULBAR).     MAC    Anesthesia Post Evaluation      Multimodal analgesia: multimodal analgesia used between 6 hours prior to anesthesia start to PACU discharge  Patient location during evaluation: PACU  Patient participation: complete - patient participated  Level of consciousness: sleepy but conscious  Pain management: adequate  Airway patency: patent  Anesthetic complications: no  Cardiovascular status: acceptable, blood pressure returned to baseline and hemodynamically stable  Respiratory status: acceptable and nasal cannula  Hydration status: acceptable  Post anesthesia nausea and vomiting:  none  Final Post Anesthesia Temperature Assessment:  Normothermia (36.0-37.5 degrees C)      INITIAL Post-op Vital signs:   Vitals Value Taken Time   /54 12/20/22 1035   Temp 36.3 °C (97.3 °F) 12/20/22 1035   Pulse 64 12/20/22 1035   Resp 15 12/20/22 1035   SpO2 96 % 12/20/22 1035

## 2022-12-20 NOTE — OP NOTES
Operative Note  . Operative Report    Patient: Parvin Tomlin MRN: [id number]  SSN: [id number]    YOB: 1945  Age: 76 y.o. Sex: female      Indications: The patient complains of painless progressive visual loss and reports difficulty with activities of daily living. Cataract surgery has been recommended to improve vision for activities of daily living, and the patient has elected to proceed. Date of Surgery: 12/20/2022     Preoperative Diagnosis: CATARACT OD H25.811    Postoperative Diagnosis: CATARACT OD    Surgeon(s) and Role:     Ray Preston MD - Primary     Anesthesia:  Other     Procedure: Procedure(s):  PHACOEMULSIFICATION WITH INTRA OCULAR LENS IMPLANT - RIGHT EYE (ANES RETROBULBAR)     Procedure in Detail:  After informed consent was obtained, the patient was brought into the operating suite. MAC with sedation and a retrobulbar block using a 50/50 mixture of lidocaine 2% and Marcaine 0.75% with added Amphatase was administered without complication. The patient was prepped and draped in the usual sterile ophthalmic fashion. A wire lid speculum was placed. A paracentesis was made using a 75 blade. The eye was filled with Provisc. A 2.8mm keratome was used to make a temporal clear corneal incision. A cystotome and Utrada forcep was used to make a continuous curvilinear capsulorrhexis without complication. Hydrodisection was performed until the nuclear rotated freely in the capsular bag. The cataract was removed using a two handed divide and conquer technique using a Dario as a second instrument. Irrigation/aspiration was used to remove the remaining cortex. Capsular polish was used as needed. The bag was inflated using Provisc. An intraocular lens was placed into the bag without complication. Irrigation/aspiration was used to remove the viscoelastic. The chamber was filled with BSS and the wound hydrated. The wound was found to be watertight.   Subconjunctival injections of ancef and decadron were given. The lid speculum and drape was removed. The eye was dressed with ointment, eye pads, and eye shield. The patient was taken to the recover room having tolerated the procedure well. Findings:  None         Estimated Blood Loss:  Minimal    Specimens: * No specimens in log *                Implants:   Implant Name Type Inv.  Item Serial No.  Lot No. LRB No. Used Action   LENS IOL POST 1-PC 6X13 21.5 -- ACRYSOF - [id number]  LENS IOL POST 1-PC 6X13 21.5 -- ACRYSOF [id number] [employer] N/A Right 1 Implanted              Complications:  None

## 2022-12-20 NOTE — BRIEF OP NOTE
Brief Postoperative Note    Patient: Dexter Ndiaye  YOB: 1945  MRN: [id number]    Date of Procedure: 12/20/2022     Pre-Op Diagnosis: CATARACT OD    Post-Op Diagnosis: Same as preoperative diagnosis. Procedure(s):  PHACOEMULSIFICATION WITH INTRA OCULAR LENS IMPLANT - RIGHT EYE (ANES RETROBULBAR)    Surgeon(s):  Rachel Mena MD    Surgical Assistant: None    Anesthesia: Other     Estimated Blood Loss (mL): Minimal    Complications: None    Specimens: * No specimens in log *     Implants:   Implant Name Type Inv.  Item Serial No.  Lot No. LRB No. Used Action   LENS IOL POST 1-PC 6X13 21.5 -- ACRYSOF - [id number]  LENS IOL POST 1-PC 6X13 21.5 -- ACRYSOF [id number] [employer] N/A Right 1 Implanted       Drains: * No LDAs found *    Findings: none    Electronically Signed by Bg Simmons MD on 12/20/2022 at 1:39 PM

## 2022-12-20 NOTE — DISCHARGE INSTRUCTIONS
Dr. Brendon Miguel and [address].  [address]  [phone]    Cataract Post Operative Instructions    Diet - you may eat a normal diet but avoid spicy or greasy tonight. Activity - Limit heavy lifting - do not lift more than 20 pounds for the next 7 days. Leave your eye patch on tonight. Your eye should remain closed under the patch. Your patch will be removed in Dr. Tracy Godinez office tomorrow. Most people do not have significant pain after cataract surgery. You may take any over-the-counter pain medication that you normally take as needed. You may resume all of your pre-operative medications. You should have your postoperative drops. If you do not[facility] You will start eyedrops TOMORROW. You have an appointment with Dr. Nidia Juarez tomorrow in her office. Date: ___12/21/2022_____________ Time: ______9:15am___________    If you need to speak to Dr. Nidia Juarez after business hours, please call [phone]. DO NOT TAKE SLEEPING MEDICATIONS OR ANTIANXIETY MEDICATIONS WHILE TAKING NARCOTIC PAIN MEDICATIONS,  ESPECIALLY THE NIGHT OF ANESTHESIA. CPAP PATIENTS BE SURE TO WEAR MACHINE WHENEVER NAPPING OR SLEEPING. DISCHARGE SUMMARY from Nurse    The following personal items collected during your admission are returned to you:   Dental Appliance: Dental Appliances: Uppers, With patient  Vision: Visual Aid: Glasses, At home  Hearing Aid:    Jewelry:    Clothing:    Other Valuables:    Valuables sent to safe:        PATIENT INSTRUCTIONS:    Anesthesia Discharge Instructions for Procedural Area requiring Sedation (MAC Anesthesia, Cath Lab, Endo and Radiology): You have been given medications during your procedure that may affect your memory and mental judgement for the next 24 hours.  During this time frame for your safety, please follow the instructions listed below :   Have a responsible adult to drive you home and be with you for at least 24 hours. Rest today and resume normal activities tomorrow. Start with a soft bland diet and advance as tolerated to your recommended diet. Do not drive any motor vehicle or operate mechanical or electrical equipment prior to [employer]. Avoid making critical decisions or signing legal documents prior to 6am tomorrow. Do not drink alcohol prior to 6am tomorrow. If you have sleep apnea and you plan to go home and take a nap, please use your CPAP machine not only at bedtime, but also while napping for 24 hours. If you notice any redness or swelling on parts of your body where IV medications were given, place a warm wet washcloth over the area for 20 minutes at a time until the redness or swelling goes away. If you still have redness or swelling after 2-3 days, please call us. You will receive a Post Operative Call from one of the Recovery Room Nurses on the day after your surgery to check on you. It is very important for us to know how you are recovering after your surgery. If you have an issue or need to speak with someone, please call your surgeon, do not wait for the post operative call. You may receive an e-mail or letter in the mail from [employer] regarding your experience with us in the Ambulatory Surgery Unit. Your feedback is valuable to us and we appreciate your participation in the survey. We wish you a speedy recovery ? What to do at Home:      *  Please give a list of your current medications to your Primary Care Provider. *  Please update this list whenever your medications are discontinued, doses are      changed, or new medications (including over-the-counter products) are added. *  Please carry medication information at all times in case of emergency situations. If you have not received your influenza and/or pneumococcal vaccine, please follow up with your primary care physician.       The discharge information has been reviewed with the patient and caregiver. The patient and caregiver verbalized understanding.

## 2023-01-03 ENCOUNTER — HOSPITAL ENCOUNTER (OUTPATIENT)
Dept: MAMMOGRAPHY | Age: 78
Discharge: HOME | End: 2023-01-03
Attending: NURSE PRACTITIONER
Payer: MEDICARE

## 2023-01-03 DIAGNOSIS — Z85.3 HISTORY OF BREAST CANCER: ICD-10-CM

## 2023-01-03 PROCEDURE — 77062 BREAST TOMOSYNTHESIS BI: CPT

## 2023-03-03 DIAGNOSIS — D05.01 NEOPLASM OF RIGHT BREAST, PRIMARY TUMOR STAGING CATEGORY TIS: LOBULAR CARCINOMA IN SITU (LCIS): ICD-10-CM

## 2023-03-03 DIAGNOSIS — Z79.811 AROMATASE INHIBITOR USE: ICD-10-CM

## 2023-03-03 NOTE — PROGRESS NOTES
Christiano Nguyen is a [de-identified] y.o. female here for one year follow up for right breast cancer. She is taking Letrozole. She is taking everyday. Pt doing well. No concerns brought up. 1. Have you been to the ER, urgent care clinic since your last visit? Hospitalized since your last visit? 12/6/22 left cataract surgery    2. Have you seen or consulted any other health care providers outside of the [address] [place] [address] since your last visit? Include any pap smears or colon screening.   no

## 2023-03-06 ENCOUNTER — OFFICE VISIT (OUTPATIENT)
Dept: ONCOLOGY | Age: 78
End: 2023-03-06
Payer: MEDICARE

## 2023-03-06 VITALS
HEART RATE: 70 BPM | OXYGEN SATURATION: 94 % | BODY MASS INDEX: 24.55 KG/M2 | DIASTOLIC BLOOD PRESSURE: 77 MMHG | HEIGHT: 62 IN | SYSTOLIC BLOOD PRESSURE: 171 MMHG | TEMPERATURE: 98.2 F | WEIGHT: 133.4 LBS

## 2023-03-06 DIAGNOSIS — Z79.811 AROMATASE INHIBITOR USE: ICD-10-CM

## 2023-03-06 DIAGNOSIS — D05.01 NEOPLASM OF RIGHT BREAST, PRIMARY TUMOR STAGING CATEGORY TIS: LOBULAR CARCINOMA IN SITU (LCIS): Primary | ICD-10-CM

## 2023-03-06 PROCEDURE — G0463 HOSPITAL OUTPT CLINIC VISIT: HCPCS | Performed by: INTERNAL MEDICINE

## 2023-03-06 PROCEDURE — G8536 NO DOC ELDER MAL SCRN: HCPCS | Performed by: INTERNAL MEDICINE

## 2023-03-06 PROCEDURE — 1123F ACP DISCUSS/DSCN MKR DOCD: CPT | Performed by: INTERNAL MEDICINE

## 2023-03-06 PROCEDURE — 1101F PT FALLS ASSESS-DOCD LE1/YR: CPT | Performed by: INTERNAL MEDICINE

## 2023-03-06 PROCEDURE — 1090F PRES/ABSN URINE INCON ASSESS: CPT | Performed by: INTERNAL MEDICINE

## 2023-03-06 PROCEDURE — G8399 PT W/DXA RESULTS DOCUMENT: HCPCS | Performed by: INTERNAL MEDICINE

## 2023-03-06 PROCEDURE — 99213 OFFICE O/P EST LOW 20 MIN: CPT | Performed by: INTERNAL MEDICINE

## 2023-03-06 PROCEDURE — G8420 CALC BMI NORM PARAMETERS: HCPCS | Performed by: INTERNAL MEDICINE

## 2023-03-06 PROCEDURE — G8427 DOCREV CUR MEDS BY ELIG CLIN: HCPCS | Performed by: INTERNAL MEDICINE

## 2023-03-06 PROCEDURE — G8432 DEP SCR NOT DOC, RNG: HCPCS | Performed by: INTERNAL MEDICINE

## 2023-03-06 RX ORDER — LETROZOLE 2.5 MG/1
TABLET, FILM COATED ORAL
Qty: 90 TABLET | Refills: 0 | Status: SHIPPED | OUTPATIENT
Start: 2023-03-06

## 2023-03-06 NOTE — PROGRESS NOTES
[address] [facility] [address], [address] [facility], [address] [place] [address]  [phone]      Oncology Progress Note        Patient: Rosana Escalera MRN: [id number]  SSN: [id number]    YOB: 1945  Age: [de-identified] y.o. Sex: female        Diagnosis:     1. Right breast LCIS - Dx: 2/2019    Nuclear grade 2  %    Treatment:     1. Letrozole 2.5 mg daily  2. S/p lumpectomy 3/28/2019 - Dr. Lincoln Maguire    Subjective:      Rsoana Escalera is a [de-identified] y.o. female who I see for right breast LCIS. She underwent a screening mammogram and was noted to have suspicious calcification at 12 O'clock in the right breast. A biopsy of the area revealed DCIS. She then underwent a lumpectomy on 03/28/2019. The final pathology was read at [facility], ER +ve. She is taking Letrozole without side effects. Ms. Jamari Jimenez says she is doing well and does not have any new complaints. She continues to go to the [facility] 4 times a week. She underwent cataract surgery the end of last year as well as bilateral upper lid blepharoplasty. Review of Systems:     Constitutional: negative  Eyes: negative  Ears, Nose, Mouth, Throat, and Face: negative  Respiratory: negative  Cardiovascular: negative  Gastrointestinal: negative  Genitourinary:negative  Integument/Breast: negative  Hematologic/Lymphatic: negative  Musculoskeletal:negative  Neurological: negative    Review of systems was reviewed and updated as needed on 03/06/23.       Past Medical History:   Diagnosis Date    Breast cancer, right ([facility] [address] [facility] 2019/3    rt lumpectomy -no radiation or chemo, taking pill-estorgen blocker    Diet-controlled type 2 diabetes mellitus (HCC)     GERD (gastroesophageal reflux disease)     Gum cancer ([facility] [address] [facility] 2013    surgery / radiation    Hypertension     Hypothyroid     Ill-defined condition     no venipuncture left arm--hx skin graft    Menopause     Overactive bladder     PUD (peptic ulcer disease)      Past Surgical History:   Procedure Laterality Date    HX BREAST BIOPSY Left     early     HX BREAST LUMPECTOMY Right 2019    RIGHT BREAST LUMPECTOMY WITH ULTRASOUND performed by Marcelle Erwin MD at [facility] AMBUL[facility]ORY OR    HX CATARACT REMOVAL Left 2022    HX FREE SKIN GRAFT Left     arm    HX GI      gastric ulcer excision (1/3 stomach removed)    HX HEENT      gum surgery    HX OOPHORECTOMY      HX ORTHOPAEDIC      broken wrist    HX OTHER SURGICAL      left arm veins/tissue used for oral/facial reconstruction    HX PELON AND BSO      HX TRACHEOSTOMY  2013    removed    HX TYMPANOSTOMY Bilateral 2015    tubes    RI EGD INSERT GUIDE WIRE DILATOR PASSAGE ESOPHAGUS  2017         [place] UNLISTED PROCEDURE ABDOMEN PERITONEUM & OMENTUM  2013    Feeding tube placement ( has been removed)    UPPER GI ENDOSCOPY,BALL DIL,30MM  10/26/2016         UPPER GI ENDOSCOPY,DILATN W GUIDE  2017         UPPER GI ENDOSCOPY,DILATN W GUIDE  2017         UPPER GI ENDOSCOPY,DILATN W GUIDE  2017         UPPER GI ENDOSCOPY,DILATN W GUIDE  2017         UPPER GI ENDOSCOPY,DILATN W GUIDE  10/02/2017         UPPER GI ENDOSCOPY,DILATN W GUIDE  11/10/2017           Family History   Problem Relation Age of Onset    Cancer Father         colon    Hypertension Father     Stroke Father     Kidney Disease Mother     Breast Cancer Other     Anesth Problems Neg Hx      Social History     Tobacco Use    Smoking status: Former     Types: Cigarettes     Quit date: 1980     Years since quittin.4    Smokeless tobacco: Never   Substance Use Topics    Alcohol use: No      Prior to Admission medications    Medication Sig Start Date End Date Taking? Authorizing Provider   letrozole [facility]) 2.5 mg tablet Take 1 tablet by mouth once daily 3/6/23  Yes Arpit Garcia MD   OTHER SeroVital   Yes Provider, Historical   cyanocobalamin (VITAMIN B12) 100 mcg tablet Take 100 mcg by mouth daily.    Yes Provider, Historical   amLODIPine (NORVASC) 2.5 mg tablet Take 2.5 mg by mouth daily. Indications: high blood pressure   Yes Provider, Historical   vit a,c & e-lutein-minerals (OCUVITE) tablet Take 1 Tab by mouth daily. Yes Provider, Historical   CANNABIDIOL, CBD, EXTRACT PO Take 8 Drops by mouth two (2) times a day. Yes Provider, Historical   peg 400-propylene glycol (SYSTANE) 0.4-0.3 % drop Apply 1 Drop to eye as needed. Both eyes   Yes Provider, Historical   atorvastatin (LIPITOR) 20 mg tablet Take 20 mg by mouth nightly. Yes Provider, Historical   levothyroxine (SYNTHROID) 75 mcg tablet Take 75 mcg by mouth Daily (before breakfast). Yes Provider, Historical   acetaminophen (TYLENOL) 500 mg tablet Take 1,000 mg by mouth every six (6) hours as needed for Pain. Yes Provider, Historical   MULTIVITAMIN WITH MINERALS (HAIR,SKIN AND NAILS PO) Take 1 Tab by mouth daily. Yes Provider, Historical   temazepam (RESTORIL) 15 mg capsule Take 15 mg by mouth nightly as needed for Sleep. Yes Provider, Historical   letrozole [facility]) 2.5 mg tablet Take 1 tablet by mouth once daily 7/26/22 3/6/23  Marisa Llamas MD        No Known Allergies        Objective:     Visit Vitals  BP (!) 171/77 (BP 1 Location: Left upper arm, BP Patient Position: Sitting)   Pulse 70   Temp 98.2 °F (36.8 °C) (Temporal)   Ht 5' 2\" (1.575 m)   Wt 133 lb 6.4 oz (60.5 kg)   SpO2 94%   BMI 24.40 kg/m²     Pain Scale: 0 - No pain/10    Physical Exam:    GENERAL: alert, cooperative, no distress, appears stated age  EYE: conjunctivae/corneas clear  LYMPHATIC: Cervical, supraclavicular, and axillary nodes normal.    LUNG: clear to auscultation bilaterally  HEART: regular rate and rhythm  ABDOMEN: soft, non-tender  BREAST: deferred at this visit. Breast NP visit in October 2020  EXTREMITIES:  extremities normal, atraumatic, no cyanosis or edema  SKIN: Normal.  NEUROLOGIC: AOx3.  Gait normal.     The above physical exam was reviewed and updated as needed on 03/06/23. Assessment:     1. Right breast LCIS Dx:     Nuclear grade 2  %    ECOG PS 0  Intent of Treatment - curative  Prognosis - excellent     On letrozole 2.5 mg - started 4/2019  Tolerating treatment well  Asymptomatic  In remission    Mammogram (1/3/2023): no evidence of malignancy. 2. Osteoporosis    Hip and spine looks good. Observation      Plan:     > Continue Letrozole  > will complete 5 years in April 2023  > Follow-up 1 year      Signed by: Noelle Parra NP                     March 6, 2023       Attestation of attending physician. I personally saw and evaluated the patient and performed the key components of medical decision making with Swapna Dillon NP. I reviewed and verified the above documentation and modified it as needed. Ms. Jamari Jimenez is a women with a diagnosis of right breast DCIS. She is receiving taking hormonal therapy. Vitals is normal  Exam reveals a pleasant women  Chest is clear, heart sounds regular  Abdomen is soft  Alert and oriented     Due to continued remission, she will continue hormonal therapy. I obtained history, performed physical exam, reviewed labs and imaging and communicated the treatment plan to the patient. Signed by: Mila Baez MD                     March 6, 2023        CC. Janeth Cruz MD  CC.  Carlos Boo MD

## 2023-03-06 NOTE — LETTER
3/6/2023    Patient: Charly Robles   YOB: 1945   Date of Visit: 3/6/2023     Troy Pearson MD  [address] [facility] [address]  [address] [address]  Via Fax: [phone]    Dear Troy Pearson MD,      Thank you for referring Ms. Dario Reed to [address] [place] [address] for evaluation. My notes for this consultation are attached. If you have questions, please do not hesitate to call me. I look forward to following your patient along with you.       Sincerely,    Thalia Smith MD

## 2023-05-08 RX ORDER — LETROZOLE 2.5 MG/1
TABLET, FILM COATED ORAL
Qty: 90 TABLET | Refills: 0 | OUTPATIENT
Start: 2023-05-08

## 2023-10-16 ENCOUNTER — OFFICE VISIT (OUTPATIENT)
Age: 78
End: 2023-10-16
Payer: MEDICARE

## 2023-10-16 VITALS — BODY MASS INDEX: 24.48 KG/M2 | WEIGHT: 133 LBS | HEIGHT: 62 IN

## 2023-10-16 DIAGNOSIS — D05.01 LOBULAR CARCINOMA IN SITU OF RIGHT BREAST: Primary | ICD-10-CM

## 2023-10-16 DIAGNOSIS — Z85.3 PERSONAL HISTORY OF MALIGNANT NEOPLASM OF BREAST: ICD-10-CM

## 2023-10-16 DIAGNOSIS — Z98.890 S/P LUMPECTOMY OF BREAST: ICD-10-CM

## 2023-10-16 PROCEDURE — G8427 DOCREV CUR MEDS BY ELIG CLIN: HCPCS | Performed by: NURSE PRACTITIONER

## 2023-10-16 PROCEDURE — 1090F PRES/ABSN URINE INCON ASSESS: CPT | Performed by: NURSE PRACTITIONER

## 2023-10-16 PROCEDURE — 1123F ACP DISCUSS/DSCN MKR DOCD: CPT | Performed by: NURSE PRACTITIONER

## 2023-10-16 PROCEDURE — G8484 FLU IMMUNIZE NO ADMIN: HCPCS | Performed by: NURSE PRACTITIONER

## 2023-10-16 PROCEDURE — 99213 OFFICE O/P EST LOW 20 MIN: CPT | Performed by: NURSE PRACTITIONER

## 2023-10-16 PROCEDURE — G8399 PT W/DXA RESULTS DOCUMENT: HCPCS | Performed by: NURSE PRACTITIONER

## 2023-10-16 PROCEDURE — 1036F TOBACCO NON-USER: CPT | Performed by: NURSE PRACTITIONER

## 2023-10-16 PROCEDURE — G8420 CALC BMI NORM PARAMETERS: HCPCS | Performed by: NURSE PRACTITIONER

## 2023-10-16 NOTE — PROGRESS NOTES
HISTORY OF PRESENT ILLNESS  Rosa Strong is a 78 y.o. female     HPI  ESTABLISHED patient here for follow up RIGHT breast cancer. No breast problems to report. Taking Letrozole. Breast history -  Referring - Dr. Bushra Grover  2/11/19 - RIGHT breast biopsy - 65 yo female with right breast DCIS, grade 2 Stage 0 Er 100%  3/28/19 - RIGHT breast lumpectomy. LCIS. No DCIS. - Dr. Roxana Patricia  4/2019- started Letrozole - Dr. Annie Augustin        Family history -   Maternal cousin had breast cancer (unsure what age). Father had colon cancer. OB History    No obstetric history on file. Obstetric Comments   Menarche:  13. LMP: 1980's. # of Children:  2. Age at Delivery of First Child:  19.   Hysterectomy/oophorectomy:  YES/YES. Breast Bx:  yes. Hx of Breast Feeding:  no. BCP:  yes. Hormone therapy:  yes.                        Past Surgical History:   Procedure Laterality Date    BREAST BIOPSY Left     early 80's    BREAST LUMPECTOMY Right 03/28/2019    RIGHT BREAST LUMPECTOMY WITH ULTRASOUND performed by Mindi Womack MD at [address] [place] [address] Left 12/06/2022    EGD INSERT GUIDE WIRE DILATOR PASSAGE ESOPHAGUS  08/21/2017         GI  1980's    gastric ulcer excision (1/3 stomach removed)    HEENT  2013    gum surgery    [facility]ILPA STEROTACTIC LOC BREAST BIOPSY RIGHT Right 2/11/2019    SHILPA STEROTACTIC LOC BREAST BIOPSY RIGHT 2/11/2019 [facility] RAD MAMMO    ORTHOPEDIC SURGERY      broken wrist    OTHER SURGICAL HISTORY  2013    left arm veins/tissue used for oral/facial reconstruction    OVARY REMOVAL      [place] UNLISTED PROCEDURE ABDOMEN PERITONEUM & OMENTUM  07/05/2013    Feeding tube placement ( has been removed)    SKIN GRAFT Left     arm    NAVEEN AND BSO (CERVIX REMOVED)      TRACHEOSTOMY  2013    removed    TYMPANOSTOMY TUBE PLACEMENT Bilateral 2015    tubes    UPPER GI ENDOSCOPY,BALL DIL,30MM  10/26/2016         UPPER GI ENDOSCOPY,DILATN W GUIDE  05/01/2017         UPPER GI ENDOSCOPY,DILATN W GUIDE

## 2024-01-04 ENCOUNTER — HOSPITAL ENCOUNTER (OUTPATIENT)
Facility: HOSPITAL | Age: 79
Discharge: HOME | End: 2024-01-04
Payer: MEDICARE

## 2024-01-04 VITALS — HEIGHT: 62 IN | BODY MASS INDEX: 24.46 KG/M2 | WEIGHT: 132.94 LBS

## 2024-01-04 DIAGNOSIS — Z85.3 PERSONAL HISTORY OF MALIGNANT NEOPLASM OF BREAST: ICD-10-CM

## 2024-01-04 PROCEDURE — G0279 TOMOSYNTHESIS, MAMMO: HCPCS

## 2024-03-26 NOTE — PROGRESS NOTES
Rosa Flores is a 78 y.o. female here for one year follow up for right breast cancer.  She is taking Letrozole.     1. Have you been to the ER, urgent care clinic since your last visit?  Hospitalized since your last visit?    2. Have you seen or consulted any other health care providers outside of the [facility] since your last visit?  Include any pap smears or colon screening.

## 2024-03-28 ENCOUNTER — OFFICE VISIT (OUTPATIENT)
Age: 79
End: 2024-03-28
Payer: MEDICARE

## 2024-03-28 VITALS
WEIGHT: 134.2 LBS | RESPIRATION RATE: 16 BRPM | TEMPERATURE: 97.8 F | BODY MASS INDEX: 24.69 KG/M2 | DIASTOLIC BLOOD PRESSURE: 82 MMHG | HEIGHT: 62 IN | OXYGEN SATURATION: 97 % | HEART RATE: 71 BPM | SYSTOLIC BLOOD PRESSURE: 153 MMHG

## 2024-03-28 DIAGNOSIS — D05.01 LOBULAR CARCINOMA IN SITU OF RIGHT BREAST: Primary | ICD-10-CM

## 2024-03-28 PROCEDURE — 99213 OFFICE O/P EST LOW 20 MIN: CPT | Performed by: INTERNAL MEDICINE

## 2024-03-28 RX ORDER — COVID-19 ANTIGEN TEST
220 KIT MISCELLANEOUS AS NEEDED
COMMUNITY

## 2024-03-28 NOTE — PROGRESS NOTES
[facility] [place]   at [facility]   [address], [address]   [place] [address]   [phone]        Oncology Progress Note              Patient: Rosa Flores  MRN: [id number]   SSN: [id number]          YOB: 1945                Diagnosis:        1. Right breast LCIS - Dx: 2/2019      Nuclear grade 2   %          Treatment:        1. Letrozole 2.5 mg daily   2. S/p lumpectomy 3/28/2019 - Dr. Moncada          Subjective:         Rosa Flores is a 78 y.o. female who I see for right breast LCIS. She underwent a screening mammogram and was noted to have suspicious calcification at 12 O'clock in the right breast. A biopsy of  the area revealed DCIS. She then underwent a lumpectomy on 03/28/2019. The final pathology was read at [facility], ER +ve. She is taking Letrozole without side effects. Ms. Flores says she is doing well and does not have any new complaints. She continues to  go to the [facility] 4 times a week. She underwent cataract surgery the end of last year as well as bilateral upper lid blepharoplasty.          Review of Systems:       Constitutional: negative   Eyes: negative   Ears, Nose, Mouth, Throat, and Face: negative   Respiratory: negative   Cardiovascular: negative   Gastrointestinal: negative   Genitourinary:negative   Integument/Breast: negative   Hematologic/Lymphatic: negative   Musculoskeletal:negative   Neurological: negative     Past Medical History:   Diagnosis Date    Breast cancer, right (HCC) 2019/3    rt lumpectomy -no radiation or chemo, taking pill-estorgen blocker    Diet-controlled type 2 diabetes mellitus (HCC)     GERD (gastroesophageal reflux disease)     Gum cancer (HCC) 2013    surgery / radiation    Hypertension     Hypothyroid     Ill-defined condition     no venipuncture left arm--hx skin graft    Menopause     Overactive bladder     PUD (peptic ulcer disease)        Past Surgical History:

## 2024-03-28 NOTE — PROGRESS NOTES
78 y/o cauc female here for follow up appt for (R) breast cancer LCIS  Will be completed 5 years of letrozole in April 2023.  Pt says she has been doing good.  Does think hurt her right lower to mid back at the gym 2 days ago.  Mammo was done in January.      1. Have you been to the ER, urgent care clinic since your last visit?  Hospitalized since your last visit?No    2. Have you seen or consulted any other health care providers outside of the [facility] since your last visit?  Include any pap smears or colon screening. Yes PCP ROUTINE FOLLOW UP IN DECEMBER 2023.

## 2024-06-06 ENCOUNTER — HOSPITAL ENCOUNTER (OUTPATIENT)
Facility: HOSPITAL | Age: 79
Discharge: HOME | End: 2024-06-06
Attending: OTOLARYNGOLOGY
Payer: MEDICARE

## 2024-06-06 DIAGNOSIS — C04.9 MALIGNANT NEOPLASM OF FLOOR OF MOUTH (HCC): ICD-10-CM

## 2024-06-06 DIAGNOSIS — R93.89 ABNORMAL CXR: ICD-10-CM

## 2024-06-06 LAB — CREAT BLD-MCNC: 0.8 MG/DL (ref 0.6–1.3)

## 2024-06-06 PROCEDURE — 70491 CT SOFT TISSUE NECK W/DYE: CPT

## 2024-06-06 PROCEDURE — 6360000004 HC RX CONTRAST MEDICATION: Performed by: OTOLARYNGOLOGY

## 2024-06-06 PROCEDURE — 71260 CT THORAX DX C+: CPT

## 2024-06-06 PROCEDURE — 82565 ASSAY OF CREATININE: CPT

## 2024-06-06 RX ADMIN — IOPAMIDOL 100 ML: 755 INJECTION, SOLUTION INTRAVENOUS at 14:06

## (undated) DEVICE — BLOCK BITE ENDOSCP AD 21 MM W/ DIL BLU LF DISP

## (undated) DEVICE — KENDALL RADIOLUCENT FOAM MONITORING ELECTRODE RECTANGULAR SHAPE: Brand: KENDALL

## (undated) DEVICE — CHEST/BREAST-LF: Brand: MEDLINE INDUSTRIES, INC.

## (undated) DEVICE — BASIN EMESIS 500CC ROSE 250/CS 60/PLT: Brand: MEDEGEN MEDICAL PRODUCTS, LLC

## (undated) DEVICE — (D)SYR 10ML 1/5ML GRAD NSAF -- PKGING CHANGE USE ITEM 338027

## (undated) DEVICE — NEEDLE HYPO 18GA L1.5IN PNK S STL HUB POLYPR SHLD REG BVL

## (undated) DEVICE — NEEDLE HYPO 25GA L1.5IN BVL ORIENTED ECLIPSE

## (undated) DEVICE — NEEDLE HYPO 30GA L0.5IN BGE POLYPR HUB S STL REG BVL STR

## (undated) DEVICE — SYR 3ML LL TIP 1/10ML GRAD --

## (undated) DEVICE — NEONATAL-ADULT SPO2 SENSOR: Brand: NELLCOR

## (undated) DEVICE — SOLUTION IV 1000ML 0.9% SOD CHL

## (undated) DEVICE — Z DISCONTINUED PER MEDLINE LINE GAS SAMPLING O2/CO2 LNG AD 13 FT NSL W/ TBNG FILTERLINE

## (undated) DEVICE — Device

## (undated) DEVICE — DRAPE,REIN 53X77,STERILE: Brand: MEDLINE

## (undated) DEVICE — CATH IV AUTOGRD BC BLU 22GA 25 -- INSYTE

## (undated) DEVICE — SOLIDIFIER MEDC 1200ML -- CONVERT TO 356117

## (undated) DEVICE — TOWEL 4 PLY TISS 19X30 SUE WHT

## (undated) DEVICE — GLOVE SURG SZ 65 THK91MIL LTX FREE SYN POLYISOPRENE

## (undated) DEVICE — GAUZE SPONGES,12 PLY: Brand: CURITY

## (undated) DEVICE — BASIN EMSIS 16OZ GRAPHITE PLAS KID SHP MOLD GRAD FOR ORAL

## (undated) DEVICE — SET ADMIN 16ML TBNG L100IN 2 Y INJ SITE IV PIGGY BK DISP

## (undated) DEVICE — Device: Brand: MEDEX

## (undated) DEVICE — CUFF ADULT 1 PC 1 VINYL DISP --

## (undated) DEVICE — SYR LR LCK 1ML GRAD NSAF 30ML --

## (undated) DEVICE — STERILE POLYISOPRENE POWDER-FREE SURGICAL GLOVES WITH EMOLLIENT COATING: Brand: PROTEXIS

## (undated) DEVICE — MEDI-VAC YANK SUCT HNDL W/TPRD BULBOUS TIP: Brand: CARDINAL HEALTH

## (undated) DEVICE — 1200 GUARD II KIT W/5MM TUBE W/O VAC TUBE: Brand: GUARDIAN

## (undated) DEVICE — INTENDED FOR TISSUE SEPARATION, AND OTHER PROCEDURES THAT REQUIRE A SHARP SURGICAL BLADE TO PUNCTURE OR CUT.: Brand: BARD-PARKER ® CARBON RIB-BACK BLADES

## (undated) DEVICE — PACK CATRCT CUST AS835752] ALCON LABORATORIES INC]

## (undated) DEVICE — SOLUTION IRRIG 500ML STRL H2O NONPYROGENIC

## (undated) DEVICE — INFECTION CONTROL KIT SYS

## (undated) DEVICE — SYR ASSEMB INFL BLLN 60ML --

## (undated) DEVICE — 1010 S-DRAPE TOWEL DRAPE 10/BX: Brand: STERI-DRAPE™

## (undated) DEVICE — REM POLYHESIVE ADULT PATIENT RETURN ELECTRODE: Brand: VALLEYLAB

## (undated) DEVICE — CONTAINER,SPECIMEN,OR STERILE,4OZ: Brand: MEDLINE

## (undated) DEVICE — SUTURE VCRL SZ 2-0 L27IN ABSRB UD L26MM SH 1/2 CIR J417H

## (undated) DEVICE — SLIT FULL HDL-2.8MM ANG C-CUT: Brand: SHARPOINT

## (undated) DEVICE — SUPPORT MAMM SURG 48-50 IN 2XL BRA

## (undated) DEVICE — CATH IV AUTOGRD BC PNK 20GA 25 -- INSYTE

## (undated) DEVICE — CONTINU-FLO SOLUTION SET, 2 INJECTION SITES, MALE LUER LOCK ADAPTER WITH RETRACTABLE COLLAR, LARGE BORE STOPCOCK WITH ROTATING MALE LUER LOCK EXTENSION SET, 2 INJECTION SITES, MALE LUER LOCK ADAPTER WITH RETRACTABLE COLLAR: Brand: INTERLINK/CONTINU-FLO

## (undated) DEVICE — HYPODERMIC SAFETY NEEDLE: Brand: MONOJECT

## (undated) DEVICE — TOWEL SURG W17XL27IN STD BLU COT NONFENESTRATED PREWASHED

## (undated) DEVICE — SUTURE VCRL SZ 3-0 L27IN ABSRB UD L26MM SH 1/2 CIR J416H

## (undated) DEVICE — KENDALL DL ECG CABLE AND LEAD WIRE SYSTEM, 3-LEAD, SINGLE PATIENT USE: Brand: KENDALL

## (undated) DEVICE — SYR 10ML LUER LOK 1/5ML GRAD --

## (undated) DEVICE — (D)PREP SKN CHLRAPRP APPL 26ML -- CONVERT TO ITEM 371833

## (undated) DEVICE — AGENT VISCOELASTIC SODIUM HYALURONATE 10 MG/ML PROVISC

## (undated) DEVICE — PROBE DETECTION F/LUMPECTOMY -- ORDER IN MULTIPLES OF 5 EA ..MARGINPROBE

## (undated) DEVICE — LIGHT HANDLE: Brand: DEVON

## (undated) DEVICE — INSULATED BLADE ELECTRODE: Brand: EDGE

## (undated) DEVICE — 3M™ TEGADERM™ TRANSPARENT FILM DRESSING FRAME STYLE, 1624W, 2-3/8 IN X 2-3/4 IN (6 CM X 7 CM), 100/CT 4CT/CASE: Brand: 3M™ TEGADERM™

## (undated) DEVICE — SOLUTION LACTATED RINGERS INJECTION USP

## (undated) DEVICE — SUTURE MCRYL SZ 4-0 L27IN ABSRB UD L19MM PS-2 1/2 CIR PRIM Y426H